# Patient Record
Sex: FEMALE | Race: BLACK OR AFRICAN AMERICAN | NOT HISPANIC OR LATINO | Employment: OTHER | URBAN - METROPOLITAN AREA
[De-identification: names, ages, dates, MRNs, and addresses within clinical notes are randomized per-mention and may not be internally consistent; named-entity substitution may affect disease eponyms.]

---

## 2018-01-31 ENCOUNTER — CONVERSION ENCOUNTER (OUTPATIENT)
Dept: MAMMOGRAPHY | Facility: CLINIC | Age: 48
End: 2018-01-31

## 2018-09-18 ENCOUNTER — TELEPHONE (OUTPATIENT)
Dept: NEUROLOGY | Facility: CLINIC | Age: 48
End: 2018-09-18

## 2018-09-18 DIAGNOSIS — G43.009 MIGRAINE WITHOUT AURA AND WITHOUT STATUS MIGRAINOSUS, NOT INTRACTABLE: Primary | ICD-10-CM

## 2018-09-18 RX ORDER — BUTALBITAL, ACETAMINOPHEN AND CAFFEINE 50; 325; 40 MG/1; MG/1; MG/1
TABLET ORAL
Qty: 30 TABLET | Refills: 2 | Status: SHIPPED | OUTPATIENT
Start: 2018-09-18 | End: 2018-11-14 | Stop reason: SDUPTHER

## 2018-09-18 NOTE — TELEPHONE ENCOUNTER
Patient is calling for a refill for her Fioricet #30 with 2 refills   She has an appointment next month

## 2018-11-01 ENCOUNTER — OFFICE VISIT (OUTPATIENT)
Dept: NEUROLOGY | Facility: CLINIC | Age: 48
End: 2018-11-01
Payer: COMMERCIAL

## 2018-11-01 ENCOUNTER — TELEPHONE (OUTPATIENT)
Dept: HEMATOLOGY ONCOLOGY | Facility: CLINIC | Age: 48
End: 2018-11-01

## 2018-11-01 VITALS
WEIGHT: 201.2 LBS | SYSTOLIC BLOOD PRESSURE: 114 MMHG | DIASTOLIC BLOOD PRESSURE: 80 MMHG | BODY MASS INDEX: 35.65 KG/M2 | HEART RATE: 112 BPM | RESPIRATION RATE: 18 BRPM | HEIGHT: 63 IN

## 2018-11-01 DIAGNOSIS — R55 NEAR SYNCOPE: ICD-10-CM

## 2018-11-01 DIAGNOSIS — G43.009 MIGRAINE WITHOUT AURA AND WITHOUT STATUS MIGRAINOSUS, NOT INTRACTABLE: Primary | ICD-10-CM

## 2018-11-01 DIAGNOSIS — G40.909 SEIZURE DISORDER (HCC): ICD-10-CM

## 2018-11-01 PROCEDURE — 99214 OFFICE O/P EST MOD 30 MIN: CPT | Performed by: PSYCHIATRY & NEUROLOGY

## 2018-11-01 RX ORDER — HYDROCODONE BITARTRATE AND ACETAMINOPHEN 7.5; 325 MG/1; MG/1
TABLET ORAL
Refills: 0 | COMMUNITY
Start: 2018-10-17

## 2018-11-01 RX ORDER — GABAPENTIN 100 MG/1
CAPSULE ORAL
COMMUNITY
Start: 2018-05-03 | End: 2019-01-30 | Stop reason: DRUGHIGH

## 2018-11-01 RX ORDER — ASPIRIN 81 MG/1
81 TABLET ORAL
COMMUNITY

## 2018-11-01 RX ORDER — IBUPROFEN 800 MG/1
TABLET ORAL
COMMUNITY
Start: 2015-11-12 | End: 2018-11-19

## 2018-11-01 NOTE — LETTER
November 1, 2018     Shaun Nunes MD  [address]    Patient: Hoda Goldstein   YOB: 1970   Date of Visit: 11/1/2018       Dear Dr Bailey Hannon:    Thank you for referring Hoda Goldstein to me for evaluation  Below are my notes for this consultation  If you have questions, please do not hesitate to call me  I look forward to following your patient along with you  Sincerely,        Hugo Huang MD        CC: No Recipients  Hugo Huang MD  11/1/2018 10:54 AM  Sign at close encounter  Patient is here today for her headaches    Patient ID: Hoda Goldstein is a 50 y o  female  Assessment/Plan:    Migraine without aura and without status migrainosus, not intractable  On her combined topiramate and gabapentin schedule she continues to do fairly well from the standpoint of migraine control  She experiences perhaps three episodes on a monthly basis  Fortunately, those episodes respond nicely to the judicious use of Fioricet  As result of her level of control on response, will not be changing her preventative medications schedule  --continue topiramate 200 mg twice daily  --continue gabapentin 100 mg capsules two capsules 3 times daily  --has not had recent blood work performed  Will be requesting CBC, CMP and topiramate level  Seizure disorder ([address] )  Continues to show excellent control with no overt occurrences since an episode of August 30, 2010   --continue topiramate 200 mg b i d  Near syncope  Presents with a new problem to our office  A week or so ago experience several days of recurrent lightheadedness and fear of impending faint  No loss of consciousness or awareness  Origin unclear  Very doubtful any relationship to her historical seizure issues  Not measurably orthostatic here in the office today  Will need further evaluation  --sleep-deprived EEG   --24 hour Holter monitor study    --have asked that she make a an appointment with her primary care practitioner, Dr Maureen Choi, to have this particular circumstance further evaluated  I spent a total of 25 min with the patient with greater than 50% of that time spent counseling and coordinating her care, specifically discussing her diagnosis, additional tests, and discussing the case with her care team, as detailed above  She will follow up in six weeks  Subjective:    HPI  Patient, 50years of age and right-handed, has been followed in this office for some time with an historical seizure disorder as well as migraine without aura  She also presents today with a new issue  From the standpoint of her migraine, she has remained on topiramate 200 mg twice daily along with gabapentin 200 mg 3 times daily and has had on average of perhaps three migraines on a monthly basis  Fortunately, these migraines respond very quickly to the judicious use of Fioricet  She relates no adverse side effects from her medications  Her seizure disorder has been and continues to be well controlled  She has had since last seen no symptoms to suggest any overt seizure activity with her seizures being controlled on her topiramate  Today, however, she states that approximately week or so ago she it was experiencing over several days recurrent episodes where she felt intense lightheadedness and a fear of possibly passing out although fortunately had no episodes of loss of consciousness, loss of tone or loss of awareness  She did not describe any specific orthostatic origin  However, she did relate all the episodes occurred while she was in a standing position  She has not as yet brought this to the attention of her primary care practitioner  She has not had recent blood work performed      Past Medical History:   Diagnosis Date    Migraines     Osteopenia     PVCs (premature ventricular contractions)     Seizure disorder [facility])      Past Surgical History: Procedure Laterality Date    NECK SURGERY      resection of a mass from the posterior neck    SHOULDER SURGERY Right     2 surgeries     Social History     Social History    Marital status: Unknown     Spouse name: N/A    Number of children: N/A    Years of education: N/A     Social History Main Topics    Smoking status: Never Smoker    Smokeless tobacco: Current User    Alcohol use No    Drug use: Unknown    Sexual activity: Not Asked     Other Topics Concern    None     Social History Narrative    None     Family History   Problem Relation Age of Onset    Hypertension Family     Migraines Family      Allergies   Allergen Reactions    Amitriptyline Other (See Comments)     confusion    Propranolol      Other reaction(s): Other (See Comments)  confusion       Current Outpatient Prescriptions:     aspirin (ECOTRIN LOW STRENGTH) 81 mg EC tablet, Take 81 mg by mouth, Disp: , Rfl:     butalbital-acetaminophen-caffeine (FIORICET,ESGIC) -40 mg per tablet, By oral route take 1 tablet every 4-6 hours as needed for headache  Limit 4 tablets per 24 hr , Disp: 30 tablet, Rfl: 2    Calcium Carbonate-Vitamin D3 600-400 MG-UNIT TABS, Take 1 tablet by mouth, Disp: , Rfl:     gabapentin (NEURONTIN) 100 mg capsule, take 2 capsules by oral route 3 times every day or as directed, Disp: , Rfl:     HYDROcodone-acetaminophen (NORCO) 5-325 mg per tablet, TAKE 1 TABLET 2-3 TIMES DAILY AS NEEDED, Disp: , Rfl: 0    ibuprofen (MOTRIN) 800 mg tablet, 3 (three) times a day , Disp: , Rfl:     topiramate (TOPAMAX) 200 MG tablet, Every 12 hours, Disp: , Rfl:     Objective:    Blood pressure 114/80, pulse (!) 112, resp  rate 18, height 5' 2 5" (1 588 m), weight 91 3 kg (201 lb 3 2 oz)  Physical Exam   Blood pressure right arm sitting 110/76, blood pressure left arm sitting 109/76, and blood pressure left arm standing 114/80  Pulse sitting 93  Pulse standing 112  Head normocephalic  Eyes nonicteric    No audible head or anterior neck bruits  Lungs clear to auscultation  Rhythm regular  GI (abdomen) soft nontender with bowel sounds present  No lower extremity edema  Neurological Exam  Alert  Pleasantly appropriately conversational   No dysarthria  Unremarkable spontaneous gait  Romberg maneuver performed unremarkably  Able to tandem walk  Cranial Nerves:   I: Olfactory sense intact bilaterally  II: Visual fields full to confrontation  Pupils equal, round, reactive to light with normal accomodation  Fundus: with bilaterally marginated discs  III,IV,VI: Extraocular muscles EOMI, no nystagmus  V: Masseter and pterygoid strength  Sensation in the V1 through V3 distributions intact to pinprick and light touch bilaterally  VII: Face is symmetric with no weakness noted  VIII: Audition intact to finger rub bilaterally  IX/X: Uvula midline  Soft palate elevation symmetric  XI: Trapezius and SCM strength 5/5 bilaterally  XII: Tongue midline with no atrophy or fasciculations with appropriate movement  Accurate with finger-to-nose and heel-to-shin maneuvers bilaterally  Full symmetrical strength throughout the four extremities with no upper extremity drift  Sensory testing grossly intact to pin and position throughout  Muscle stretch reflexes bilaterally two throughout the upper extremities and at the knees and ankles  Toe response is downgoing bilaterally  ROS:    Review of Systems   Constitutional: Negative for appetite change and fever  HENT: Negative  Negative for hearing loss, tinnitus, trouble swallowing and voice change  Eyes: Negative  Negative for photophobia and pain  Respiratory: Negative  Negative for shortness of breath  Cardiovascular: Negative  Negative for palpitations  Gastrointestinal: Positive for constipation  Negative for nausea and vomiting  Endocrine: Negative  Negative for cold intolerance and heat intolerance  Genitourinary: Negative    Negative for dysuria, frequency and urgency  Musculoskeletal: Positive for back pain and neck stiffness  Negative for myalgias  Skin: Negative  Negative for rash  Allergic/Immunologic: Negative  Neurological: Positive for dizziness, weakness (neck and thoracic area), light-headedness, numbness (hands and fingers ) and headaches  Negative for tremors, seizures, syncope, facial asymmetry and speech difficulty  Hematological: Negative  Does not bruise/bleed easily  Psychiatric/Behavioral: Positive for sleep disturbance  Negative for confusion and hallucinations  The patient is nervous/anxious  I personally reviewed the ROS that was entered by the medical assistant

## 2018-11-01 NOTE — ASSESSMENT & PLAN NOTE
On her combined topiramate and gabapentin schedule she continues to do fairly well from the standpoint of migraine control  She experiences perhaps three episodes on a monthly basis  Fortunately, those episodes respond nicely to the judicious use of Fioricet  As result of her level of control on response, will not be changing her preventative medications schedule  --continue topiramate 200 mg twice daily  --continue gabapentin 100 mg capsules two capsules 3 times daily  --has not had recent blood work performed  Will be requesting CBC, CMP and topiramate level

## 2018-11-01 NOTE — TELEPHONE ENCOUNTER
Patient called for blood work slips to be sent to the lab  Informed patient that labs have to be precerted first, and then she can have the labs done  Patient understood  Will call patient back when precert goes through

## 2018-11-01 NOTE — ASSESSMENT & PLAN NOTE
Presents with a new problem to our office  A week or so ago experience several days of recurrent lightheadedness and fear of impending faint  No loss of consciousness or awareness  Origin unclear  Very doubtful any relationship to her historical seizure issues  Not measurably orthostatic here in the office today  Will need further evaluation  --sleep-deprived EEG   --24 hour Holter monitor study  --have asked that she make a an appointment with her primary care practitioner, Dr Nathaniel Marsh, to have this particular circumstance further evaluated

## 2018-11-01 NOTE — ASSESSMENT & PLAN NOTE
Continues to show excellent control with no overt occurrences since an episode of August 30, 2010   --continue topiramate 200 mg b i d

## 2018-11-01 NOTE — PROGRESS NOTES
Patient is here today for her headaches    Patient ID: Michaelle Monroe is a 50 y o  female  Assessment/Plan:    Migraine without aura and without status migrainosus, not intractable  On her combined topiramate and gabapentin schedule she continues to do fairly well from the standpoint of migraine control  She experiences perhaps three episodes on a monthly basis  Fortunately, those episodes respond nicely to the judicious use of Fioricet  As result of her level of control on response, will not be changing her preventative medications schedule  --continue topiramate 200 mg twice daily  --continue gabapentin 100 mg capsules two capsules 3 times daily  --has not had recent blood work performed  Will be requesting CBC, CMP and topiramate level  Seizure disorder ([address] )  Continues to show excellent control with no overt occurrences since an episode of August 30, 2010   --continue topiramate 200 mg b i d  Near syncope  Presents with a new problem to our office  A week or so ago experience several days of recurrent lightheadedness and fear of impending faint  No loss of consciousness or awareness  Origin unclear  Very doubtful any relationship to her historical seizure issues  Not measurably orthostatic here in the office today  Will need further evaluation  --sleep-deprived EEG   --24 hour Holter monitor study  --have asked that she make a an appointment with her primary care practitioner, Dr Parish Swanson, to have this particular circumstance further evaluated  I spent a total of 25 min with the patient with greater than 50% of that time spent counseling and coordinating her care, specifically discussing her diagnosis, additional tests, and discussing the case with her care team, as detailed above  She will follow up in six weeks      Subjective:    HPI  Patient, 50years of age and right-handed, has been followed in this office for some time with an historical seizure disorder as well as migraine without aura  She also presents today with a new issue  From the standpoint of her migraine, she has remained on topiramate 200 mg twice daily along with gabapentin 200 mg 3 times daily and has had on average of perhaps three migraines on a monthly basis  Fortunately, these migraines respond very quickly to the judicious use of Fioricet  She relates no adverse side effects from her medications  Her seizure disorder has been and continues to be well controlled  She has had since last seen no symptoms to suggest any overt seizure activity with her seizures being controlled on her topiramate  Today, however, she states that approximately week or so ago she it was experiencing over several days recurrent episodes where she felt intense lightheadedness and a fear of possibly passing out although fortunately had no episodes of loss of consciousness, loss of tone or loss of awareness  She did not describe any specific orthostatic origin  However, she did relate all the episodes occurred while she was in a standing position  She has not as yet brought this to the attention of her primary care practitioner  She has not had recent blood work performed      Past Medical History:   Diagnosis Date    Migraines     Osteopenia     PVCs (premature ventricular contractions)     Seizure disorder ([facility])      Past Surgical History:   Procedure Laterality Date    NECK SURGERY      resection of a mass from the posterior neck    SHOULDER SURGERY Right     2 surgeries     Social History     Social History    Marital status: Unknown     Spouse name: N/A    Number of children: N/A    Years of education: N/A     Social History Main Topics    Smoking status: Never Smoker    Smokeless tobacco: Current User    Alcohol use No    Drug use: Unknown    Sexual activity: Not Asked     Other Topics Concern    None     Social History Narrative    None     Family History   Problem Relation Age of Onset    Hypertension Family     Migraines Family      Allergies   Allergen Reactions    Amitriptyline Other (See Comments)     confusion    Propranolol      Other reaction(s): Other (See Comments)  confusion       Current Outpatient Prescriptions:     aspirin (ECOTRIN LOW STRENGTH) 81 mg EC tablet, Take 81 mg by mouth, Disp: , Rfl:     butalbital-acetaminophen-caffeine (FIORICET,ESGIC) -40 mg per tablet, By oral route take 1 tablet every 4-6 hours as needed for headache  Limit 4 tablets per 24 hr , Disp: 30 tablet, Rfl: 2    Calcium Carbonate-Vitamin D3 600-400 MG-UNIT TABS, Take 1 tablet by mouth, Disp: , Rfl:     gabapentin (NEURONTIN) 100 mg capsule, take 2 capsules by oral route 3 times every day or as directed, Disp: , Rfl:     HYDROcodone-acetaminophen (NORCO) 5-325 mg per tablet, TAKE 1 TABLET 2-3 TIMES DAILY AS NEEDED, Disp: , Rfl: 0    ibuprofen (MOTRIN) 800 mg tablet, 3 (three) times a day , Disp: , Rfl:     topiramate (TOPAMAX) 200 MG tablet, Every 12 hours, Disp: , Rfl:     Objective:    Blood pressure 114/80, pulse (!) 112, resp  rate 18, height 5' 2 5" (1 588 m), weight 91 3 kg (201 lb 3 2 oz)  Physical Exam   Blood pressure right arm sitting 110/76, blood pressure left arm sitting 109/76, and blood pressure left arm standing 114/80  Pulse sitting 93  Pulse standing 112  Head normocephalic  Eyes nonicteric  No audible head or anterior neck bruits  Lungs clear to auscultation  Rhythm regular  GI (abdomen) soft nontender with bowel sounds present  No lower extremity edema  Neurological Exam  Alert  Pleasantly appropriately conversational   No dysarthria  Unremarkable spontaneous gait  Romberg maneuver performed unremarkably  Able to tandem walk  Cranial Nerves:   I: Olfactory sense intact bilaterally  II: Visual fields full to confrontation  Pupils equal, round, reactive to light with normal accomodation  Fundus: with bilaterally marginated discs    III,IV,VI: Extraocular muscles EOMI, no nystagmus  V: Masseter and pterygoid strength  Sensation in the V1 through V3 distributions intact to pinprick and light touch bilaterally  VII: Face is symmetric with no weakness noted  VIII: Audition intact to finger rub bilaterally  IX/X: Uvula midline  Soft palate elevation symmetric  XI: Trapezius and SCM strength 5/5 bilaterally  XII: Tongue midline with no atrophy or fasciculations with appropriate movement  Accurate with finger-to-nose and heel-to-shin maneuvers bilaterally  Full symmetrical strength throughout the four extremities with no upper extremity drift  Sensory testing grossly intact to pin and position throughout  Muscle stretch reflexes bilaterally two throughout the upper extremities and at the knees and ankles  Toe response is downgoing bilaterally  ROS:    Review of Systems   Constitutional: Negative for appetite change and fever  HENT: Negative  Negative for hearing loss, tinnitus, trouble swallowing and voice change  Eyes: Negative  Negative for photophobia and pain  Respiratory: Negative  Negative for shortness of breath  Cardiovascular: Negative  Negative for palpitations  Gastrointestinal: Positive for constipation  Negative for nausea and vomiting  Endocrine: Negative  Negative for cold intolerance and heat intolerance  Genitourinary: Negative  Negative for dysuria, frequency and urgency  Musculoskeletal: Positive for back pain and neck stiffness  Negative for myalgias  Skin: Negative  Negative for rash  Allergic/Immunologic: Negative  Neurological: Positive for dizziness, weakness (neck and thoracic area), light-headedness, numbness (hands and fingers ) and headaches  Negative for tremors, seizures, syncope, facial asymmetry and speech difficulty  Hematological: Negative  Does not bruise/bleed easily  Psychiatric/Behavioral: Positive for sleep disturbance  Negative for confusion and hallucinations   The patient is nervous/anxious  I personally reviewed the ROS that was entered by the medical assistant

## 2018-11-01 NOTE — PATIENT INSTRUCTIONS
Continue your topiramate 200 mg tablets taking one tablet twice daily  Continue your gabapentin 100 mg capsules to 3 times daily  Continue to use your Fioricet judiciously  Please make an appointment with Dr Дмитрий Steward for him to further evaluate your recurrent lightheaded symptoms

## 2018-11-02 ENCOUNTER — TRANSCRIBE ORDERS (OUTPATIENT)
Dept: ADMINISTRATIVE | Facility: HOSPITAL | Age: 48
End: 2018-11-02

## 2018-11-02 ENCOUNTER — TRANSCRIBE ORDERS (OUTPATIENT)
Dept: LAB | Facility: CLINIC | Age: 48
End: 2018-11-02

## 2018-11-02 DIAGNOSIS — D75.0 FAMILIAL ERYTHROCYTOSIS: Primary | ICD-10-CM

## 2018-11-06 ENCOUNTER — TRANSCRIBE ORDERS (OUTPATIENT)
Dept: ADMINISTRATIVE | Facility: HOSPITAL | Age: 48
End: 2018-11-06

## 2018-11-06 ENCOUNTER — APPOINTMENT (OUTPATIENT)
Dept: LAB | Facility: HOSPITAL | Age: 48
End: 2018-11-06
Payer: COMMERCIAL

## 2018-11-06 ENCOUNTER — APPOINTMENT (OUTPATIENT)
Dept: LAB | Facility: HOSPITAL | Age: 48
End: 2018-11-06
Attending: INTERNAL MEDICINE
Payer: COMMERCIAL

## 2018-11-06 DIAGNOSIS — E55.9 AVITAMINOSIS D: ICD-10-CM

## 2018-11-06 DIAGNOSIS — E03.9 MYXEDEMA HEART DISEASE: ICD-10-CM

## 2018-11-06 DIAGNOSIS — G40.909 SEIZURE DISORDER (HCC): ICD-10-CM

## 2018-11-06 DIAGNOSIS — D64.9 ANEMIA, UNSPECIFIED TYPE: ICD-10-CM

## 2018-11-06 DIAGNOSIS — D75.0 FAMILIAL ERYTHROCYTOSIS: ICD-10-CM

## 2018-11-06 DIAGNOSIS — I51.9 MYXEDEMA HEART DISEASE: Primary | ICD-10-CM

## 2018-11-06 DIAGNOSIS — I51.9 MYXEDEMA HEART DISEASE: ICD-10-CM

## 2018-11-06 DIAGNOSIS — E03.9 MYXEDEMA HEART DISEASE: Primary | ICD-10-CM

## 2018-11-06 DIAGNOSIS — G43.009 MIGRAINE WITHOUT AURA AND WITHOUT STATUS MIGRAINOSUS, NOT INTRACTABLE: ICD-10-CM

## 2018-11-06 LAB
25(OH)D3 SERPL-MCNC: 23.2 NG/ML (ref 30–100)
ALBUMIN SERPL BCP-MCNC: 4.8 G/DL (ref 3–5.2)
ALP SERPL-CCNC: 95 U/L (ref 43–122)
ALT SERPL W P-5'-P-CCNC: 34 U/L (ref 9–52)
ANION GAP SERPL CALCULATED.3IONS-SCNC: 11 MMOL/L (ref 5–14)
ANISOCYTOSIS BLD QL SMEAR: PRESENT
APTT PPP: 30 SECONDS (ref 23–34)
AST SERPL W P-5'-P-CCNC: 24 U/L (ref 14–36)
BASOPHILS # BLD AUTO: 0 THOUSANDS/ΜL (ref 0–0.1)
BASOPHILS NFR BLD AUTO: 1 % (ref 0–1)
BILIRUB SERPL-MCNC: 0.4 MG/DL
BUN SERPL-MCNC: 10 MG/DL (ref 5–25)
CALCIUM SERPL-MCNC: 9.7 MG/DL (ref 8.4–10.2)
CHLORIDE SERPL-SCNC: 110 MMOL/L (ref 97–108)
CO2 SERPL-SCNC: 23 MMOL/L (ref 22–30)
CREAT SERPL-MCNC: 0.81 MG/DL (ref 0.6–1.2)
CRP SERPL QL: 6.2 MG/L
D-DIMER: 0.27 MG/L
DEPRECATED AT III PPP: 113 % OF NORMAL (ref 92–136)
EOSINOPHIL # BLD AUTO: 0 THOUSAND/ΜL (ref 0–0.4)
EOSINOPHIL NFR BLD AUTO: 0 % (ref 0–6)
ERYTHROCYTE [DISTWIDTH] IN BLOOD BY AUTOMATED COUNT: 13.5 %
ERYTHROCYTE [SEDIMENTATION RATE] IN BLOOD: 7 MM/HOUR (ref 1–20)
FERRITIN SERPL-MCNC: 38 NG/ML (ref 8–388)
FOLATE SERPL-MCNC: 6.4 NG/ML (ref 3.1–17.5)
GFR SERPL CREATININE-BSD FRML MDRD: 99 ML/MIN/1.73SQ M
GLUCOSE SERPL-MCNC: 79 MG/DL (ref 70–99)
HCT VFR BLD AUTO: 51.3 % (ref 36–46)
HGB BLD-MCNC: 17.3 G/DL (ref 12–16)
IGA SERPL-MCNC: 123 MG/DL (ref 70–400)
IGG SERPL-MCNC: 1150 MG/DL (ref 700–1600)
IGM SERPL-MCNC: 105 MG/DL (ref 40–230)
INR PPP: 1 (ref 0.89–1.1)
LDH SERPL-CCNC: 527 U/L (ref 313–618)
LYMPHOCYTES # BLD AUTO: 2.3 THOUSANDS/ΜL (ref 0.5–4)
LYMPHOCYTES NFR BLD AUTO: 34 % (ref 20–50)
MCH RBC QN AUTO: 36.8 PG (ref 26–34)
MCHC RBC AUTO-ENTMCNC: 33.8 G/DL (ref 31–36)
MCV RBC AUTO: 109 FL (ref 80–100)
MONOCYTES # BLD AUTO: 0.3 THOUSAND/ΜL (ref 0.2–0.9)
MONOCYTES NFR BLD AUTO: 5 % (ref 1–10)
NEUTROPHILS # BLD AUTO: 4.1 THOUSANDS/ΜL (ref 1.8–7.8)
NEUTS SEG NFR BLD AUTO: 61 % (ref 45–65)
PLATELET # BLD AUTO: 240 THOUSANDS/UL (ref 150–450)
PLATELET BLD QL SMEAR: ADEQUATE
PMV BLD AUTO: 7.9 FL (ref 8.9–12.7)
POTASSIUM SERPL-SCNC: 3.5 MMOL/L (ref 3.6–5)
PROT SERPL-MCNC: 8.3 G/DL (ref 5.9–8.4)
PROTHROMBIN TIME: 10.6 SECONDS (ref 9.5–11.6)
RBC # BLD AUTO: 4.71 MILLION/UL (ref 4–5.2)
RBC MORPH BLD: PRESENT
RETICS # CALC: 1.41 % (ref 0.87–2.63)
SODIUM SERPL-SCNC: 144 MMOL/L (ref 137–147)
T3FREE SERPL-MCNC: 2.21 PG/ML (ref 2.3–4.2)
T4 SERPL-MCNC: 9.4 UG/DL (ref 4.7–13.3)
TIBC SERPL-MCNC: 372 UG/DL (ref 250–450)
TSH SERPL DL<=0.05 MIU/L-ACNC: 0.75 UIU/ML (ref 0.47–4.68)
VIT B12 SERPL-MCNC: 852 PG/ML (ref 100–900)
WBC # BLD AUTO: 6.8 THOUSAND/UL (ref 4.5–11)

## 2018-11-06 PROCEDURE — 85379 FIBRIN DEGRADATION QUANT: CPT

## 2018-11-06 PROCEDURE — 81240 F2 GENE: CPT

## 2018-11-06 PROCEDURE — 85303 CLOT INHIBIT PROT C ACTIVITY: CPT

## 2018-11-06 PROCEDURE — 84443 ASSAY THYROID STIM HORMONE: CPT

## 2018-11-06 PROCEDURE — 85045 AUTOMATED RETICULOCYTE COUNT: CPT

## 2018-11-06 PROCEDURE — 85307 ASSAY ACTIVATED PROTEIN C: CPT

## 2018-11-06 PROCEDURE — 83615 LACTATE (LD) (LDH) ENZYME: CPT

## 2018-11-06 PROCEDURE — 36415 COLL VENOUS BLD VENIPUNCTURE: CPT

## 2018-11-06 PROCEDURE — 82668 ASSAY OF ERYTHROPOIETIN: CPT

## 2018-11-06 PROCEDURE — 82728 ASSAY OF FERRITIN: CPT

## 2018-11-06 PROCEDURE — 80053 COMPREHEN METABOLIC PANEL: CPT

## 2018-11-06 PROCEDURE — 86140 C-REACTIVE PROTEIN: CPT

## 2018-11-06 PROCEDURE — 85730 THROMBOPLASTIN TIME PARTIAL: CPT

## 2018-11-06 PROCEDURE — 82746 ASSAY OF FOLIC ACID SERUM: CPT

## 2018-11-06 PROCEDURE — 85306 CLOT INHIBIT PROT S FREE: CPT

## 2018-11-06 PROCEDURE — 85670 THROMBIN TIME PLASMA: CPT

## 2018-11-06 PROCEDURE — 85300 ANTITHROMBIN III ACTIVITY: CPT

## 2018-11-06 PROCEDURE — 85652 RBC SED RATE AUTOMATED: CPT

## 2018-11-06 PROCEDURE — 83918 ORGANIC ACIDS TOTAL QUANT: CPT

## 2018-11-06 PROCEDURE — 82607 VITAMIN B-12: CPT

## 2018-11-06 PROCEDURE — 82784 ASSAY IGA/IGD/IGG/IGM EACH: CPT

## 2018-11-06 PROCEDURE — 85613 RUSSELL VIPER VENOM DILUTED: CPT

## 2018-11-06 PROCEDURE — 84165 PROTEIN E-PHORESIS SERUM: CPT | Performed by: PATHOLOGY

## 2018-11-06 PROCEDURE — 82232 ASSAY OF BETA-2 PROTEIN: CPT

## 2018-11-06 PROCEDURE — 85610 PROTHROMBIN TIME: CPT

## 2018-11-06 PROCEDURE — 81270 JAK2 GENE: CPT

## 2018-11-06 PROCEDURE — 84165 PROTEIN E-PHORESIS SERUM: CPT

## 2018-11-06 PROCEDURE — 86147 CARDIOLIPIN ANTIBODY EA IG: CPT

## 2018-11-06 PROCEDURE — 82306 VITAMIN D 25 HYDROXY: CPT

## 2018-11-06 PROCEDURE — 86146 BETA-2 GLYCOPROTEIN ANTIBODY: CPT

## 2018-11-06 PROCEDURE — 85732 THROMBOPLASTIN TIME PARTIAL: CPT

## 2018-11-06 PROCEDURE — 80201 ASSAY OF TOPIRAMATE: CPT

## 2018-11-06 PROCEDURE — 85025 COMPLETE CBC W/AUTO DIFF WBC: CPT

## 2018-11-06 PROCEDURE — 83550 IRON BINDING TEST: CPT

## 2018-11-06 PROCEDURE — 85705 THROMBOPLASTIN INHIBITION: CPT

## 2018-11-06 PROCEDURE — 84436 ASSAY OF TOTAL THYROXINE: CPT

## 2018-11-06 PROCEDURE — 84481 FREE ASSAY (FT-3): CPT

## 2018-11-07 ENCOUNTER — TELEPHONE (OUTPATIENT)
Dept: NEUROLOGY | Facility: CLINIC | Age: 48
End: 2018-11-07

## 2018-11-07 LAB
ALBUMIN SERPL ELPH-MCNC: 4.19 G/DL (ref 3.5–5)
ALBUMIN SERPL ELPH-MCNC: 55.9 % (ref 52–65)
ALPHA1 GLOB SERPL ELPH-MCNC: 0.4 G/DL (ref 0.1–0.4)
ALPHA1 GLOB SERPL ELPH-MCNC: 5.3 % (ref 2.5–5)
ALPHA2 GLOB SERPL ELPH-MCNC: 1.01 G/DL (ref 0.4–1.2)
ALPHA2 GLOB SERPL ELPH-MCNC: 13.5 % (ref 7–13)
B2 MICROGLOB SERPL-MCNC: 1.6 MG/L (ref 0.6–2.4)
BETA GLOB ABNORMAL SERPL ELPH-MCNC: 0.44 G/DL (ref 0.4–0.8)
BETA1 GLOB SERPL ELPH-MCNC: 5.8 % (ref 5–13)
BETA2 GLOB SERPL ELPH-MCNC: 5.3 % (ref 2–8)
BETA2+GAMMA GLOB SERPL ELPH-MCNC: 0.4 G/DL (ref 0.2–0.5)
CARDIOLIPIN IGA SER IA-ACNC: <9 APL U/ML (ref 0–11)
CARDIOLIPIN IGG SER IA-ACNC: <9 GPL U/ML (ref 0–14)
CARDIOLIPIN IGM SER IA-ACNC: 10 MPL U/ML (ref 0–12)
EPO SERPL-ACNC: 7.1 MIU/ML (ref 2.6–18.5)
GAMMA GLOB ABNORMAL SERPL ELPH-MCNC: 1.07 G/DL (ref 0.5–1.6)
GAMMA GLOB SERPL ELPH-MCNC: 14.2 % (ref 12–22)
IGG/ALB SER: 1.27 {RATIO} (ref 1.1–1.8)
PROT PATTERN SERPL ELPH-IMP: ABNORMAL
PROT SERPL-MCNC: 7.5 G/DL (ref 6.4–8.2)

## 2018-11-07 NOTE — TELEPHONE ENCOUNTER
Labs CMP and CBC dated 11/618 were faxed to PCP office Dr Washburn [place] office per Dr Beti Hester for review

## 2018-11-08 LAB
APTT SCREEN TO CONFIRM RATIO: 1.17 RATIO (ref 0–1.4)
B2 GLYCOPROT1 IGA SER-ACNC: <9 GPI IGA UNITS (ref 0–25)
B2 GLYCOPROT1 IGG SER-ACNC: <9 GPI IGG UNITS (ref 0–20)
B2 GLYCOPROT1 IGM SER-ACNC: <9 GPI IGM UNITS (ref 0–32)
CONFIRM APTT/NORMAL: 46.9 SEC (ref 0–55)
LA PPP-IMP: NORMAL
PROT C AG ACT/NOR PPP IA: >150 % OF NORMAL (ref 60–150)
PROT S ACT/NOR PPP: 82 % (ref 63–140)
SCREEN APTT: 36.8 SEC (ref 0–51.9)
SCREEN DRVVT: 38.2 SEC (ref 0–47)
THROMBIN TIME: 17.8 SEC (ref 0–23)
TOPIRAMATE SERPL-MCNC: 6.8 UG/ML (ref 2–25)

## 2018-11-09 LAB
F2 GENE MUT ANL BLD/T: NORMAL
SCAN RESULT: NORMAL

## 2018-11-12 LAB
METHYLMALONATE SERPL-SCNC: 76 NMOL/L (ref 0–378)
SL AMB DISCLAIMER: NORMAL

## 2018-11-14 ENCOUNTER — TELEPHONE (OUTPATIENT)
Dept: HEMATOLOGY ONCOLOGY | Facility: CLINIC | Age: 48
End: 2018-11-14

## 2018-11-14 DIAGNOSIS — G43.009 MIGRAINE WITHOUT AURA AND WITHOUT STATUS MIGRAINOSUS, NOT INTRACTABLE: ICD-10-CM

## 2018-11-14 RX ORDER — BUTALBITAL, ACETAMINOPHEN AND CAFFEINE 50; 325; 40 MG/1; MG/1; MG/1
TABLET ORAL
Qty: 30 TABLET | Refills: 1 | Status: SHIPPED | OUTPATIENT
Start: 2018-11-14 | End: 2019-01-30 | Stop reason: SDUPTHER

## 2018-11-14 NOTE — TELEPHONE ENCOUNTER
Called patient, call went to voicemail  Left message for patient to give the office a call regarding her request for lab results, as Dr Jonah Eller will go over her labs at her f/u apt  Offered pt an apt for tomorrow so she can get results sooner

## 2018-11-14 NOTE — TELEPHONE ENCOUNTER
Patient called wanting the results of her lab work she had done 18  Patient does not want to wait for her follow up apt, which is 18  Patient wants to know if there are any abnormalities  She is worried because her brother who  also had a history like hers

## 2018-11-14 NOTE — TELEPHONE ENCOUNTER
Can you call her back and tell her that Dr Marco Tejeda does not like to discuss results over the phone especially since she had multiple different tests  He will discuss all the results with her at the follow up appointment in 2 days

## 2018-11-16 ENCOUNTER — TELEPHONE (OUTPATIENT)
Dept: HEMATOLOGY ONCOLOGY | Facility: CLINIC | Age: 48
End: 2018-11-16

## 2018-11-16 NOTE — TELEPHONE ENCOUNTER
Patient missed 10:40 a m  Appointment  Called and left a message offering the patient a later apt time for today, or we can reschedule for another day; asked for patient to call the office back to reschedule

## 2018-11-19 ENCOUNTER — OFFICE VISIT (OUTPATIENT)
Dept: HEMATOLOGY ONCOLOGY | Facility: CLINIC | Age: 48
End: 2018-11-19
Payer: COMMERCIAL

## 2018-11-19 VITALS
SYSTOLIC BLOOD PRESSURE: 110 MMHG | DIASTOLIC BLOOD PRESSURE: 78 MMHG | TEMPERATURE: 96.8 F | RESPIRATION RATE: 18 BRPM | OXYGEN SATURATION: 98 % | BODY MASS INDEX: 35.97 KG/M2 | HEIGHT: 63 IN | HEART RATE: 78 BPM | WEIGHT: 203 LBS

## 2018-11-19 DIAGNOSIS — D75.0 POLYCYTHEMIA, FAMILIAL: Primary | ICD-10-CM

## 2018-11-19 DIAGNOSIS — Z83.2 FAMILY HISTORY OF HYPERCOAGULABILITY: ICD-10-CM

## 2018-11-19 PROCEDURE — 99214 OFFICE O/P EST MOD 30 MIN: CPT | Performed by: INTERNAL MEDICINE

## 2018-11-19 RX ORDER — HYDROCODONE BITARTRATE 30 MG/1
CAPSULE, EXTENDED RELEASE ORAL 2 TIMES DAILY
COMMUNITY

## 2018-11-19 NOTE — PROGRESS NOTES
Hematology/Oncology Outpatient Follow-up  Vera Castillo 50 y o  female 1970 [id number]    Date:  2018        Assessment and Plan:  1  Polycythemia, familial  The patient was was negative for the JAK2 mutation however, she continues to have polycythemia which seems to be familial   She has also high MCV which needs to be further investigated since the initial workup did not reveal any hint of abnormalities from the peripheral blood  After lengthy discussion a bone marrow biopsy was ordered to rule out a bone marrow disorder like a myeloproliferative disorder as the etiology of her macrocytic erythrocytosis/polycythemia  - CBC and differential; Future  - Comprehensive metabolic panel; Future  - LD,Blood; Future  - Erythropoietin; Future  - Viscosity, serum; Future  - IR bone marrow biopsy/aspiration; Future    2  Family history of hypercoagulability  The hypercoagulable workup came back negative  We will wait for the factor 5 Leiden study to come back  I did advise her that she has her risk of developing deep vein thrombosis since this is running in her family even though her hypercoagulable state workup came back negative so far  I do strongly recommend prophylactic use of low molecular heparin with any surgical procedure  HPI:  The patient came in today for a follow-up visit  She is a 49-year-old female with strong positive family history for clotting disorder  The patient has a history of seizure disorder, arthritis, migraine headaches, history of colon polyps, her last colonoscopy was in   Her family history is positive for polycythemia/erythrocytosis in her mother and maternal aunt  Her brother  at age 55 due to deep vein thrombosis/pulmonary embolism after a car trip  Her sister also had a diagnosis of DVT at age 52 after also a car trip    The patient seemed very concerned about her strong family history for hypercoagulability and asked to get extensive workup done for that particular reason  The hypercoagulable workup came back entirely normal with out any hint of prothrombin G mutation , her factor 5 Leiden studies still pending  She also has a positive history for polycythemia most likely since childhood with positive family history for the same problem as stated above  Her most recent blood work continues to show elevated hemoglobin hematocrit and high MCV, with normal erythropoietin level and negative JAK2 mutation study  The patient seems to be symptomatic with itching after hot showers and frequent headaches  Interval history:    ROS: Review of Systems   Constitutional: Positive for fatigue  Negative for activity change, appetite change, chills, diaphoresis, fever and unexpected weight change  Hot flashes   HENT: Positive for mouth sores  Negative for congestion, dental problem, ear discharge, ear pain, facial swelling, hearing loss, nosebleeds, postnasal drip, sore throat, tinnitus and trouble swallowing  Eyes: Negative for discharge, redness, itching and visual disturbance  Respiratory: Negative for cough, chest tightness, shortness of breath and wheezing  Cardiovascular: Negative for chest pain, palpitations and leg swelling  Gastrointestinal: Positive for constipation  Negative for abdominal distention, abdominal pain, anal bleeding, blood in stool, diarrhea, nausea and vomiting  Genitourinary: Negative for difficulty urinating, dysuria, flank pain, frequency, hematuria and urgency  Musculoskeletal: Positive for arthralgias, back pain and neck pain  Negative for gait problem, joint swelling and myalgias  Skin: Negative for color change, pallor, rash and wound  Neurological: Positive for numbness and headaches (Severe)  Negative for dizziness, syncope, speech difficulty, weakness and light-headedness  Hematological: Negative for adenopathy  Does not bruise/bleed easily     Psychiatric/Behavioral: Negative for agitation, behavioral problems, confusion and sleep disturbance  Past Medical History:   Diagnosis Date    Migraines     Osteopenia     PVCs (premature ventricular contractions)     Seizure disorder (HCC)        Past Surgical History:   Procedure Laterality Date    NECK SURGERY      resection of a mass from the posterior neck    SHOULDER SURGERY Right     2 surgeries       Social History     Social History    Marital status: /Civil Union     Spouse name: N/A    Number of children: N/A    Years of education: N/A     Social History Main Topics    Smoking status: Current Every Day Smoker    Smokeless tobacco: Never Used    Alcohol use No    Drug use: No    Sexual activity: Not Asked     Other Topics Concern    None     Social History Narrative    None       Family History   Problem Relation Age of Onset    Hypertension Family     Migraines Family        Allergies   Allergen Reactions    Amitriptyline Other (See Comments)     confusion    Propranolol      Other reaction(s): Other (See Comments)  confusion         Current Outpatient Prescriptions:     aspirin (ECOTRIN LOW STRENGTH) 81 mg EC tablet, Take 81 mg by mouth, Disp: , Rfl:     butalbital-acetaminophen-caffeine (FIORICET,ESGIC) -40 mg per tablet, By oral route take 1 tablet every 4-6 hours as needed for headache    Limit 4 tablets per 24 hr , Disp: 30 tablet, Rfl: 1    Calcium Carbonate-Vitamin D3 600-400 MG-UNIT TABS, Take 1 tablet by mouth, Disp: , Rfl:     gabapentin (NEURONTIN) 100 mg capsule, take 2 capsules by oral route 3 times every day or as directed, Disp: , Rfl:     HYDROcodone Bitartrate ER (ZOHYDRO ER) 30 MG CP12, Take by mouth, Disp: , Rfl:     HYDROcodone-acetaminophen (NORCO) 5-325 mg per tablet, TAKE 1 TABLET 2-3 TIMES DAILY AS NEEDED, Disp: , Rfl: 0    topiramate (TOPAMAX) 200 MG tablet, Every 12 hours, Disp: , Rfl:       Physical Exam:  /78 (BP Location: Left arm, Patient Position: Sitting, Cuff Size: Adult)   Pulse 78 Temp (!) 96 8 °F (36 °C) (Tympanic)   Resp 18   Ht 5' 2 5" (1 588 m)   Wt 92 1 kg (203 lb)   SpO2 98%   BMI 36 54 kg/m²     Physical Exam   Constitutional: She is oriented to person, place, and time  She appears well-developed and well-nourished  No distress  HENT:   Head: Normocephalic and atraumatic  Nose: Nose normal    Mouth/Throat: Oropharynx is clear and moist    Eyes: Pupils are equal, round, and reactive to light  Conjunctivae and EOM are normal  Right eye exhibits no discharge  Left eye exhibits no discharge  No scleral icterus  Neck: Normal range of motion  Neck supple  No JVD present  No tracheal deviation present  No thyromegaly present  Cardiovascular: Normal rate, regular rhythm and normal heart sounds  Exam reveals no friction rub  No murmur heard  Pulmonary/Chest: Effort normal and breath sounds normal  No stridor  No respiratory distress  She has no wheezes  She has no rales  She exhibits no tenderness  Abdominal: Soft  Bowel sounds are normal  She exhibits no distension and no mass  There is no hepatosplenomegaly, splenomegaly or hepatomegaly  There is no tenderness  There is no rebound and no guarding  Musculoskeletal: Normal range of motion  She exhibits no edema, tenderness or deformity  Lymphadenopathy:     She has no cervical adenopathy  Neurological: She is alert and oriented to person, place, and time  She has normal reflexes  No cranial nerve deficit  Coordination normal    Skin: Skin is warm and dry  No rash noted  She is not diaphoretic  No erythema  No pallor  Psychiatric: She has a normal mood and affect   Her behavior is normal  Judgment and thought content normal          Labs:  Lab Results   Component Value Date    WBC 6 80 11/06/2018    HGB 17 3 (H) 11/06/2018    HCT 51 3 (H) 11/06/2018     (H) 11/06/2018     11/06/2018     Lab Results   Component Value Date    K 3 5 (L) 11/06/2018     (H) 11/06/2018    CO2 23 11/06/2018    BUN 10 11/06/2018    CREATININE 0 81 11/06/2018    CALCIUM 9 7 11/06/2018    AST 24 11/06/2018    ALT 34 11/06/2018    ALKPHOS 95 11/06/2018    EGFR 99 11/06/2018     No results found for: TSH    Patient voiced understanding and agreement in the above discussion  Aware to contact our office with questions/symptoms in the interim

## 2018-11-26 LAB — MISCELLANEOUS LAB TEST RESULT: NORMAL

## 2018-11-28 ENCOUNTER — TELEPHONE (OUTPATIENT)
Dept: SURGERY | Facility: HOSPITAL | Age: 48
End: 2018-11-28

## 2018-11-29 ENCOUNTER — HOSPITAL ENCOUNTER (OUTPATIENT)
Dept: INTERVENTIONAL RADIOLOGY/VASCULAR | Facility: HOSPITAL | Age: 48
Discharge: HOME | End: 2018-11-29
Admitting: PHYSICAL MEDICINE & REHABILITATION
Payer: COMMERCIAL

## 2018-11-29 VITALS
WEIGHT: 190 LBS | BODY MASS INDEX: 33.66 KG/M2 | SYSTOLIC BLOOD PRESSURE: 117 MMHG | RESPIRATION RATE: 18 BRPM | HEART RATE: 74 BPM | DIASTOLIC BLOOD PRESSURE: 74 MMHG | TEMPERATURE: 95.9 F | HEIGHT: 63 IN | OXYGEN SATURATION: 98 %

## 2018-11-29 DIAGNOSIS — M12.88 OTHER SPECIFIC ARTHROPATHIES, NOT ELSEWHERE CLASSIFIED, OTHER SPECIFIED SITE: ICD-10-CM

## 2018-11-29 DIAGNOSIS — M54.2 CERVICALGIA: ICD-10-CM

## 2018-11-29 PROCEDURE — 99153 MOD SED SAME PHYS/QHP EA: CPT

## 2018-11-29 PROCEDURE — 99152 MOD SED SAME PHYS/QHP 5/>YRS: CPT

## 2018-11-29 RX ORDER — SODIUM CHLORIDE 9 MG/ML
50 INJECTION, SOLUTION INTRAVENOUS CONTINUOUS
Status: DISCONTINUED | OUTPATIENT
Start: 2018-11-29 | End: 2018-12-03 | Stop reason: HOSPADM

## 2018-11-29 RX ORDER — LIDOCAINE HYDROCHLORIDE 10 MG/ML
INJECTION, SOLUTION EPIDURAL; INFILTRATION; INTRACAUDAL; PERINEURAL CODE/TRAUMA/SEDATION MEDICATION
Status: COMPLETED | OUTPATIENT
Start: 2018-11-29 | End: 2018-11-29

## 2018-11-29 RX ORDER — KETOROLAC TROMETHAMINE 30 MG/ML
INJECTION, SOLUTION INTRAMUSCULAR; INTRAVENOUS CODE/TRAUMA/SEDATION MEDICATION
Status: COMPLETED | OUTPATIENT
Start: 2018-11-29 | End: 2018-11-29

## 2018-11-29 RX ORDER — FENTANYL CITRATE 50 UG/ML
INJECTION, SOLUTION INTRAMUSCULAR; INTRAVENOUS CODE/TRAUMA/SEDATION MEDICATION
Status: COMPLETED | OUTPATIENT
Start: 2018-11-29 | End: 2018-11-29

## 2018-11-29 RX ORDER — MIDAZOLAM HYDROCHLORIDE 1 MG/ML
INJECTION INTRAMUSCULAR; INTRAVENOUS CODE/TRAUMA/SEDATION MEDICATION
Status: COMPLETED | OUTPATIENT
Start: 2018-11-29 | End: 2018-11-29

## 2018-11-29 RX ADMIN — MIDAZOLAM HYDROCHLORIDE 1 MG: 1 INJECTION, SOLUTION INTRAMUSCULAR; INTRAVENOUS at 12:14

## 2018-11-29 RX ADMIN — KETOROLAC TROMETHAMINE 30 MG: 30 INJECTION, SOLUTION INTRAMUSCULAR; INTRAVENOUS at 12:04

## 2018-11-29 RX ADMIN — MIDAZOLAM HYDROCHLORIDE 2 MG: 1 INJECTION, SOLUTION INTRAMUSCULAR; INTRAVENOUS at 12:08

## 2018-11-29 RX ADMIN — SODIUM BICARBONATE 5 MEQ: 84 INJECTION PARENTERAL at 12:06

## 2018-11-29 RX ADMIN — LIDOCAINE HYDROCHLORIDE 5 ML: 10 INJECTION, SOLUTION EPIDURAL; INFILTRATION; INTRACAUDAL; PERINEURAL at 12:05

## 2018-11-29 RX ADMIN — FENTANYL CITRATE 50 MCG: 50 INJECTION INTRAMUSCULAR; INTRAVENOUS at 12:04

## 2018-11-29 RX ADMIN — SODIUM CHLORIDE 50 ML/HR: 9 INJECTION, SOLUTION INTRAVENOUS at 10:47

## 2018-11-29 RX ADMIN — MIDAZOLAM HYDROCHLORIDE 2 MG: 1 INJECTION, SOLUTION INTRAMUSCULAR; INTRAVENOUS at 12:04

## 2018-11-29 RX ADMIN — FENTANYL CITRATE 25 MCG: 50 INJECTION INTRAMUSCULAR; INTRAVENOUS at 12:08

## 2018-11-29 RX ADMIN — FENTANYL CITRATE 25 MCG: 50 INJECTION INTRAMUSCULAR; INTRAVENOUS at 12:14

## 2018-11-29 NOTE — H&P
History of Present Illness: The patient is a 50 y o  female who presents with complaints of right cervical spondylosis, cervical facet syndrome    Patient Active Problem List   Diagnosis    Migraine without aura and without status migrainosus, not intractable    Seizure disorder (HCC)    Near syncope    Polycythemia, familial    Family history of hypercoagulability       Past Medical History:   Diagnosis Date    Migraines     Osteopenia     PVCs (premature ventricular contractions)     Seizure disorder (HCC)        Past Surgical History:   Procedure Laterality Date    APPENDECTOMY      COLONOSCOPY      NECK SURGERY      resection of a mass from the posterior neck    SHOULDER SURGERY Right     2 surgeries         Current Outpatient Prescriptions:     aspirin (ECOTRIN LOW STRENGTH) 81 mg EC tablet, Take 81 mg by mouth, Disp: , Rfl:     butalbital-acetaminophen-caffeine (FIORICET,ESGIC) -40 mg per tablet, By oral route take 1 tablet every 4-6 hours as needed for headache  Limit 4 tablets per 24 hr , Disp: 30 tablet, Rfl: 1    Calcium Carbonate-Vitamin D3 600-400 MG-UNIT TABS, Take 1 tablet by mouth, Disp: , Rfl:     gabapentin (NEURONTIN) 100 mg capsule, take 2 capsules by oral route 3 times every day or as directed, Disp: , Rfl:     HYDROcodone Bitartrate ER (ZOHYDRO ER) 30 MG CP12, Take by mouth, Disp: , Rfl:     HYDROcodone-acetaminophen (NORCO) 5-325 mg per tablet, TAKE 1 TABLET 2-3 TIMES DAILY AS NEEDED, Disp: , Rfl: 0    topiramate (TOPAMAX) 200 MG tablet, Every 12 hours, Disp: , Rfl:     Current Facility-Administered Medications:     sodium chloride 0 9 % infusion, 50 mL/hr, Intravenous, Continuous, Eric Dixon MD    Allergies   Allergen Reactions    Amitriptyline Other (See Comments)     confusion    Propranolol      Other reaction(s):  Other (See Comments)  confusion       Physical Exam:   Vitals:    11/29/18 1255   BP: 117/74   Pulse: 74   Resp: 18   Temp:    SpO2: General: Awake, Alert, Oriented x 3, Mood and affect appropriate  Respiratory: Respirations even and unlabored  Cardiovascular: Peripheral pulses intact; no edema  Musculoskeletal Exam:  Limited cervical range of motion, tender cervical facets      Assessment:   1  Cervicalgia    2   Other specific arthropathies, not elsewhere classified, other specified site        Plan:  Right cervical facet ablation

## 2018-11-29 NOTE — NURSING NOTE
Pt tolerated toast and PO fluids  Pt denies nausea  IV site D/C'd intact  Site WNL  Band-aid applied  Discharge Instructions provided and reviewed with pt  Pt verbalizes understanding of same  Lumbar Radiofrequency Ablation provided and updated for Cervical  Pt denies pain  Positive movement/sensation to bilateral arms and legs  Gait steady when walking  No complaints offered  Pt discharged home ambulatory accompanied by

## 2018-11-29 NOTE — NURSING NOTE
Pt received back to Rm 445 via stretcher from Cath Lab  Pt AAOx3  VSS  2 Band-aids C/D/I to Right Anterior Neck  Positive movement/sensation to all extremities  Pt denies pain  IV fluids infusing  Call bell within reach   at pt's side  Snack provided

## 2018-11-29 NOTE — DISCHARGE INSTRUCTIONS
Lumbar Radiofrequency Ablation   WHAT YOU NEED TO KNOW:   Lumbar radiofrequency ablation (RFA) is a procedure used to treat facet joint pain in your lower back  Facet joints are found at the back of each vertebra  A needle electrode is used to send electrical currents to the nerves in your facet joint  The electrical currents create heat that damages the nerve so it cannot send pain signals  DISCHARGE INSTRUCTIONS:   Seek care immediately if:   · You cannot move your leg  · You cannot control your urine or bowel movements  · You have severe pain in your lower back  Contact your healthcare provider if:   · You have leg weakness  · You develop new symptoms  · You have questions or concerns about your condition or care  Medicines:   · Pain medicine  may be given  Ask how to take this medicine safely  · Take your medicine as directed  Contact your healthcare provider if you think your medicine is not helping or if you have side effects  Tell him or her if you are allergic to any medicine  Keep a list of the medicines, vitamins, and herbs you take  Include the amounts, and when and why you take them  Bring the list or the pill bottles to follow-up visits  Carry your medicine list with you in case of an emergency  Follow up with your healthcare provider as directed:  Write down your questions so you remember to ask them during your visits  Activity:  Do not drive a car or operate machinery within 24 hours after your procedure  Ask your healthcare provider about any other activities you should avoid  © 2017 [employer] Information is for End User's use only and may not be sold, redistributed or otherwise used for commercial purposes  All illustrations and images included in CareNotes® are the copyrighted property of [employer]  or [employer]  The above information is an  only   It is not intended as medical advice for individual conditions or treatments  Talk to your doctor, nurse or pharmacist before following any medical regimen to see if it is safe and effective for you

## 2018-12-04 ENCOUNTER — HOSPITAL ENCOUNTER (OUTPATIENT)
Dept: NEUROLOGY | Facility: HOSPITAL | Age: 48
Discharge: HOME | End: 2018-12-04
Payer: COMMERCIAL

## 2018-12-04 DIAGNOSIS — G40.909 SEIZURE DISORDER (HCC): ICD-10-CM

## 2018-12-04 PROCEDURE — 95819 EEG AWAKE AND ASLEEP: CPT | Performed by: PSYCHIATRY & NEUROLOGY

## 2018-12-04 PROCEDURE — 95819 EEG AWAKE AND ASLEEP: CPT

## 2019-01-30 ENCOUNTER — OFFICE VISIT (OUTPATIENT)
Dept: NEUROLOGY | Facility: CLINIC | Age: 49
End: 2019-01-30
Payer: COMMERCIAL

## 2019-01-30 ENCOUNTER — APPOINTMENT (OUTPATIENT)
Dept: LAB | Facility: HOSPITAL | Age: 49
End: 2019-01-30
Attending: INTERNAL MEDICINE
Payer: COMMERCIAL

## 2019-01-30 ENCOUNTER — TRANSCRIBE ORDERS (OUTPATIENT)
Dept: ADMINISTRATIVE | Facility: HOSPITAL | Age: 49
End: 2019-01-30

## 2019-01-30 ENCOUNTER — TELEPHONE (OUTPATIENT)
Dept: NEUROLOGY | Facility: CLINIC | Age: 49
End: 2019-01-30

## 2019-01-30 VITALS
DIASTOLIC BLOOD PRESSURE: 76 MMHG | HEIGHT: 63 IN | BODY MASS INDEX: 36 KG/M2 | WEIGHT: 203.2 LBS | SYSTOLIC BLOOD PRESSURE: 126 MMHG | RESPIRATION RATE: 18 BRPM | HEART RATE: 78 BPM

## 2019-01-30 DIAGNOSIS — G40.909 SEIZURE DISORDER (HCC): Primary | ICD-10-CM

## 2019-01-30 DIAGNOSIS — R55 NEAR SYNCOPE: ICD-10-CM

## 2019-01-30 DIAGNOSIS — G43.009 MIGRAINE WITHOUT AURA AND WITHOUT STATUS MIGRAINOSUS, NOT INTRACTABLE: ICD-10-CM

## 2019-01-30 DIAGNOSIS — D75.0 POLYCYTHEMIA, FAMILIAL: ICD-10-CM

## 2019-01-30 PROCEDURE — 85810 BLOOD VISCOSITY EXAMINATION: CPT

## 2019-01-30 PROCEDURE — 82668 ASSAY OF ERYTHROPOIETIN: CPT

## 2019-01-30 PROCEDURE — 36415 COLL VENOUS BLD VENIPUNCTURE: CPT

## 2019-01-30 PROCEDURE — 99214 OFFICE O/P EST MOD 30 MIN: CPT | Performed by: PSYCHIATRY & NEUROLOGY

## 2019-01-30 RX ORDER — BUTALBITAL, ACETAMINOPHEN AND CAFFEINE 50; 325; 40 MG/1; MG/1; MG/1
TABLET ORAL
Qty: 30 TABLET | Refills: 1 | Status: SHIPPED | OUTPATIENT
Start: 2019-01-30 | End: 2019-03-15 | Stop reason: SDUPTHER

## 2019-01-30 RX ORDER — GABAPENTIN 300 MG/1
300 CAPSULE ORAL 3 TIMES DAILY
Qty: 270 CAPSULE | Refills: 1 | Status: SHIPPED | OUTPATIENT
Start: 2019-01-30 | End: 2019-09-12 | Stop reason: SDUPTHER

## 2019-01-30 RX ORDER — LORAZEPAM 0.5 MG/1
0.5 TABLET ORAL 2 TIMES DAILY PRN
Refills: 1 | COMMUNITY
Start: 2019-01-17 | End: 2021-10-29 | Stop reason: CLARIF

## 2019-01-30 NOTE — PATIENT INSTRUCTIONS
While awaiting four your new gabapentin to arrive increase gabapentin 100 mg capsules and take 3 capsules 3 times daily  When the new gabapentin arrives will be 300 mg capsules and you are to take o1 capsule 3 times daily  Please keep a headache diary  Call the office in one month with a status report or before then should she have any questions or concerns

## 2019-01-30 NOTE — TELEPHONE ENCOUNTER
Patient called stating she spoke to pharmacist and he said they had not received the prescription  I called and spoke with Ana Paula Jorgensen, pharmacist, who states that they have the prescription but it was in a "special prescription que" and it requires something to be done with insurance  I asked if anything further was needed from our office which he denied  I also asked for time frame as to when the prescription would be ready for the patient  Joe said patient can  in 10 mins if she needs to and they will complete whatever is necessary  Patient made aware

## 2019-01-30 NOTE — ASSESSMENT & PLAN NOTE
Have been non problematic since her last appointment  EEG a normal awake drowsing sleep study  Has yet to have her 24 hour Holter study performed but plans to do so  Continues to work with her primary care practitioner regarding this particular past issue

## 2019-01-30 NOTE — ASSESSMENT & PLAN NOTE
Continues to be well controlled with no seizure or seizure-like symptoms  Last recorded event August 30, 2010   --continues on her topiramate 200 mg twice daily

## 2019-01-30 NOTE — ASSESSMENT & PLAN NOTE
Has been experiencing on average of three migraines on a monthly basis and is hopeful for yet better control  Tolerating her current topiramate schedule without any adverse side effects  Has been on low-dose gabapentin as an adjunct agent for headache prevention  --hesitant to consider further increase in her topiramate given her already total daily dose of 400 mg  As result she will continue her topiramate at 200 mg twice daily  --advance gabapentin to 300 mg 3 times daily  --to continue utilizing her Fioricet judiciously  --to maintain a headache diary and check in with the office with a status update in four weeks  She will call before then should she have any questions or concerns with the advanced gabapentin schedule

## 2019-01-30 NOTE — PROGRESS NOTES
Patient is here today for her migraine's    Patient ID: Miley Dowling is a 50 y o  female  Assessment/Plan:    Near syncope  Have been non problematic since her last appointment  EEG a normal awake drowsing sleep study  Has yet to have her 24 hour Holter study performed but plans to do so  Continues to work with her primary care practitioner regarding this particular past issue  Seizure disorder ([address] )  Continues to be well controlled with no seizure or seizure-like symptoms  Last recorded event August 30, 2010   --continues on her topiramate 200 mg twice daily  Migraine without aura and without status migrainosus, not intractable  Has been experiencing on average of three migraines on a monthly basis and is hopeful for yet better control  Tolerating her current topiramate schedule without any adverse side effects  Has been on low-dose gabapentin as an adjunct agent for headache prevention  --hesitant to consider further increase in her topiramate given her already total daily dose of 400 mg  As result she will continue her topiramate at 200 mg twice daily  --advance gabapentin to 300 mg 3 times daily  --to continue utilizing her Fioricet judiciously  --to maintain a headache diary and check in with the office with a status update in four weeks  She will call before then should she have any questions or concerns with the advanced gabapentin schedule  I spent a total of 25 min with the patient with greater than 50% of that time spent counseling and coordinating her care, specifically discussing her diagnosis, additional tests, and discussing the case with her care team, as detailed above  She will follow up in three months or p r n     Subjective:    HPI  Patient, 50years of age, follows here with several issues  On her last appointment she was describing several episodes of near syncope  Fortunately, these have been non recurrence since    She did have her follow-up EEG study performed and the results were reviewed with her  The study was a normal awake, drowsing and sleep study  She was also scheduled to have a 24 hour Holter monitor performed, but has not as yet done so  She is working with her primary care practitioner with regard to these past symptoms  With regard to her migraines, she has been experiencing on average of three headaches on a monthly basis  She is hopeful for yet better control  She has been on topiramate 200 mg twice daily (also for her historical seizure disorder) with the recent addition of low-dose gabapentin 200 mg t i d  as an adjunct measure  She expresses no symptoms to suggest any adverse side effects from either medication  She does use her Fioricet very judiciously so as to avoid any analgesic overuse effect  With regard to her seizures, she continues to do well on her topiramate  She has had no symptoms to suggest any recurrence with her last reported event on August 30, 2010  Blood work was performed November was reviewed topiramate level 6 8  CMP essentially unremarkable  CBC with hemoglobin 17 3 and hematocrit 51 3 (known familial polycythemia) with white count 6 80 and platelet count 716      Past Medical History:   Diagnosis Date    Migraines     Osteopenia     PVCs (premature ventricular contractions)     Seizure disorder (HCC)      Past Surgical History:   Procedure Laterality Date    APPENDECTOMY      COLONOSCOPY      NECK SURGERY      resection of a mass from the posterior neck    SHOULDER SURGERY Right     2 surgeries     Social History     Social History    Marital status: /Civil Union     Spouse name: N/A    Number of children: N/A    Years of education: N/A     Social History Main Topics    Smoking status: Current Every Day Smoker     Packs/day: 0 50    Smokeless tobacco: Never Used    Alcohol use No    Drug use: No    Sexual activity: Yes     Other Topics Concern    None     Social History Narrative  None     Family History   Problem Relation Age of Onset    Hypertension Family     Migraines Family     Hypertension Father      Allergies   Allergen Reactions    Amitriptyline Other (See Comments)     confusion    Propranolol Other (See Comments)     Other reaction(s): Other (See Comments)  confusion       Current Outpatient Prescriptions:     aspirin (ECOTRIN LOW STRENGTH) 81 mg EC tablet, Take 81 mg by mouth, Disp: , Rfl:     butalbital-acetaminophen-caffeine (FIORICET,ESGIC) -40 mg per tablet, By oral route take 1 tablet every 4-6 hours as needed for headache  Limit 4 tablets per 24 hr , Disp: 30 tablet, Rfl: 1    Calcium Carbonate-Vitamin D3 600-400 MG-UNIT TABS, Take 1 tablet by mouth, Disp: , Rfl:     HYDROcodone Bitartrate ER (ZOHYDRO ER) 30 MG CP12, Take by mouth, Disp: , Rfl:     HYDROcodone-acetaminophen (NORCO) 5-325 mg per tablet, TAKE 1 TABLET 2-3 TIMES DAILY AS NEEDED, Disp: , Rfl: 0    topiramate (TOPAMAX) 200 MG tablet, Take 1 tablet (200 mg total) by mouth 2 (two) times a day, Disp: 180 tablet, Rfl: 1    gabapentin (NEURONTIN) 300 mg capsule, Take 1 capsule (300 mg total) by mouth 3 (three) times a day, Disp: 270 capsule, Rfl: 1    LORazepam (ATIVAN) 0 5 mg tablet, 0 5 mg 2 (two) times a day as needed, Disp: , Rfl: 1    Objective:    Blood pressure 126/76, pulse 78, resp  rate 18, height 5' 2 5" (1 588 m), weight 92 2 kg (203 lb 3 2 oz)  Physical Exam  Lungs clear to auscultation  Rhythm regular  No audible anterior neck bruits  Neurological Exam  Alert  Pleasantly interactive  Fully oriented  Unremarkable spontaneous gait  Cranial nerves 2-12 tested and grossly intact  Funduscopic examination with bilaterally marginated discs  Full symmetrical strength throughout the four extremities with no upper extremity drift  Muscle stretch reflexes bilaterally one throughout the upper extremities, at the knees and at the ankles      ROS:    Review of Systems Constitutional: Negative  Negative for appetite change and fever  HENT: Negative  Negative for hearing loss, tinnitus, trouble swallowing and voice change  Eyes: Negative  Negative for photophobia and pain  Respiratory: Negative  Negative for shortness of breath  Cardiovascular: Negative  Negative for palpitations  Gastrointestinal: Positive for constipation  Negative for nausea and vomiting  Endocrine: Negative  Negative for cold intolerance and heat intolerance  Genitourinary: Negative  Negative for dysuria, frequency and urgency  Musculoskeletal: Positive for back pain, neck pain and neck stiffness  Negative for myalgias  Balance issues   Skin: Negative  Negative for rash  Neurological: Positive for headaches  Negative for dizziness, tremors, seizures, syncope, facial asymmetry, speech difficulty, weakness, light-headedness and numbness (right hand & ring finger)  Tingling in right hand   Hematological: Negative  Does not bruise/bleed easily  Psychiatric/Behavioral: Positive for confusion (at times) and sleep disturbance  Negative for hallucinations  The patient is nervous/anxious  I personally reviewed the ROS that was entered by the medical assistant  *Please note this document was created using voice recognition software and may contain sound-alike word errors  *

## 2019-01-31 LAB — EPO SERPL-ACNC: 11.5 MIU/ML (ref 2.6–18.5)

## 2019-02-01 LAB — VISC SER: 1.6 REL.SALINE (ref 1.6–1.9)

## 2019-02-22 ENCOUNTER — TRANSCRIBE ORDERS (OUTPATIENT)
Dept: ADMINISTRATIVE | Facility: HOSPITAL | Age: 49
End: 2019-02-22

## 2019-02-22 DIAGNOSIS — M81.0 OSTEOPOROSIS, UNSPECIFIED OSTEOPOROSIS TYPE, UNSPECIFIED PATHOLOGICAL FRACTURE PRESENCE: ICD-10-CM

## 2019-02-22 DIAGNOSIS — Z00.00 EXAMINATION: Primary | ICD-10-CM

## 2019-02-26 ENCOUNTER — HOSPITAL ENCOUNTER (OUTPATIENT)
Dept: CT IMAGING | Facility: HOSPITAL | Age: 49
Discharge: HOME | End: 2019-02-26
Attending: INTERNAL MEDICINE
Payer: COMMERCIAL

## 2019-02-26 VITALS
HEART RATE: 81 BPM | HEIGHT: 63 IN | TEMPERATURE: 97.6 F | WEIGHT: 195 LBS | RESPIRATION RATE: 20 BRPM | OXYGEN SATURATION: 98 % | BODY MASS INDEX: 34.55 KG/M2 | SYSTOLIC BLOOD PRESSURE: 110 MMHG | DIASTOLIC BLOOD PRESSURE: 78 MMHG

## 2019-02-26 DIAGNOSIS — D75.0 POLYCYTHEMIA, FAMILIAL: ICD-10-CM

## 2019-02-26 LAB
ERYTHROCYTE [DISTWIDTH] IN BLOOD BY AUTOMATED COUNT: 12.8 % (ref 11.6–15.1)
HCT VFR BLD AUTO: 47.3 % (ref 34.8–46.1)
HGB BLD-MCNC: 15.7 G/DL (ref 11.5–15.4)
INR PPP: 0.93 (ref 0.86–1.17)
MCH RBC QN AUTO: 36.3 PG (ref 26.8–34.3)
MCHC RBC AUTO-ENTMCNC: 33.2 G/DL (ref 31.4–37.4)
MCV RBC AUTO: 109 FL (ref 82–98)
PLATELET # BLD AUTO: 207 THOUSANDS/UL (ref 149–390)
PMV BLD AUTO: 9.1 FL (ref 8.9–12.7)
PROTHROMBIN TIME: 12.6 SECONDS (ref 11.8–14.2)
RBC # BLD AUTO: 4.33 MILLION/UL (ref 3.81–5.12)
WBC # BLD AUTO: 6.48 THOUSAND/UL (ref 4.31–10.16)

## 2019-02-26 PROCEDURE — 99152 MOD SED SAME PHYS/QHP 5/>YRS: CPT | Performed by: RADIOLOGY

## 2019-02-26 PROCEDURE — 85097 BONE MARROW INTERPRETATION: CPT | Performed by: PATHOLOGY

## 2019-02-26 PROCEDURE — 88341 IMHCHEM/IMCYTCHM EA ADD ANTB: CPT | Performed by: PATHOLOGY

## 2019-02-26 PROCEDURE — 85610 PROTHROMBIN TIME: CPT | Performed by: RADIOLOGY

## 2019-02-26 PROCEDURE — 88305 TISSUE EXAM BY PATHOLOGIST: CPT | Performed by: PATHOLOGY

## 2019-02-26 PROCEDURE — 85027 COMPLETE CBC AUTOMATED: CPT | Performed by: RADIOLOGY

## 2019-02-26 PROCEDURE — 88342 IMHCHEM/IMCYTCHM 1ST ANTB: CPT | Performed by: PATHOLOGY

## 2019-02-26 PROCEDURE — 88311 DECALCIFY TISSUE: CPT | Performed by: PATHOLOGY

## 2019-02-26 PROCEDURE — 81450 HL NEO GSAP 5-50DNA/DNA&RNA: CPT | Performed by: INTERNAL MEDICINE

## 2019-02-26 PROCEDURE — 99152 MOD SED SAME PHYS/QHP 5/>YRS: CPT

## 2019-02-26 PROCEDURE — 38222 DX BONE MARROW BX & ASPIR: CPT | Performed by: RADIOLOGY

## 2019-02-26 PROCEDURE — 88313 SPECIAL STAINS GROUP 2: CPT | Performed by: PATHOLOGY

## 2019-02-26 PROCEDURE — 77012 CT SCAN FOR NEEDLE BIOPSY: CPT | Performed by: RADIOLOGY

## 2019-02-26 PROCEDURE — 88237 TISSUE CULTURE BONE MARROW: CPT | Performed by: INTERNAL MEDICINE

## 2019-02-26 PROCEDURE — 88185 FLOWCYTOMETRY/TC ADD-ON: CPT

## 2019-02-26 PROCEDURE — 88184 FLOWCYTOMETRY/ TC 1 MARKER: CPT | Performed by: INTERNAL MEDICINE

## 2019-02-26 PROCEDURE — 88262 CHROMOSOME ANALYSIS 15-20: CPT | Performed by: INTERNAL MEDICINE

## 2019-02-26 PROCEDURE — 38222 DX BONE MARROW BX & ASPIR: CPT

## 2019-02-26 PROCEDURE — 99153 MOD SED SAME PHYS/QHP EA: CPT

## 2019-02-26 RX ORDER — SODIUM CHLORIDE 9 MG/ML
75 INJECTION, SOLUTION INTRAVENOUS CONTINUOUS
Status: DISCONTINUED | OUTPATIENT
Start: 2019-02-26 | End: 2019-02-27 | Stop reason: HOSPADM

## 2019-02-26 RX ORDER — MIDAZOLAM HYDROCHLORIDE 1 MG/ML
INJECTION INTRAMUSCULAR; INTRAVENOUS CODE/TRAUMA/SEDATION MEDICATION
Status: COMPLETED | OUTPATIENT
Start: 2019-02-26 | End: 2019-02-26

## 2019-02-26 RX ORDER — FENTANYL CITRATE 50 UG/ML
INJECTION, SOLUTION INTRAMUSCULAR; INTRAVENOUS CODE/TRAUMA/SEDATION MEDICATION
Status: COMPLETED | OUTPATIENT
Start: 2019-02-26 | End: 2019-02-26

## 2019-02-26 RX ADMIN — FENTANYL CITRATE 50 MCG: 50 INJECTION INTRAMUSCULAR; INTRAVENOUS at 13:53

## 2019-02-26 RX ADMIN — MIDAZOLAM 2 MG: 1 INJECTION INTRAMUSCULAR; INTRAVENOUS at 13:45

## 2019-02-26 RX ADMIN — SODIUM CHLORIDE 75 ML/HR: 0.9 INJECTION, SOLUTION INTRAVENOUS at 12:43

## 2019-02-26 RX ADMIN — MIDAZOLAM 1 MG: 1 INJECTION INTRAMUSCULAR; INTRAVENOUS at 13:53

## 2019-02-26 RX ADMIN — FENTANYL CITRATE 50 MCG: 50 INJECTION INTRAMUSCULAR; INTRAVENOUS at 13:45

## 2019-02-26 RX ADMIN — FENTANYL CITRATE 100 MCG: 50 INJECTION INTRAMUSCULAR; INTRAVENOUS at 14:03

## 2019-02-26 RX ADMIN — MIDAZOLAM 1 MG: 1 INJECTION INTRAMUSCULAR; INTRAVENOUS at 14:03

## 2019-02-26 NOTE — BRIEF OP NOTE (RAD/CATH)
IR BONE MARROW BIOPSY/ASPIRATION  Procedure Note    PATIENT NAME: Arch Head  : 1970  MRN: [id number]     Pre-op Diagnosis:   1  Polycythemia, familial      Post-op Diagnosis:   1   Polycythemia, familial        Surgeon:   Lian Park MD    Estimated Blood Loss: minimal  Findings: Bone marrow biopsy with sedation    Specimens: asp and core     Complications:  none    Anesthesia: Conscious sedation    Lian Park MD     Date: 2019  Time: 2:18 PM

## 2019-02-26 NOTE — DISCHARGE INSTRUCTIONS
Bone Marrow Biopsy     WHAT YOU NEED TO KNOW:   A bone marrow biopsy is a procedure to remove a small amount of bone marrow from your bone  Bone marrow is the soft tissue inside your bone that helps to make blood cells  The sample is tested for disease or infection  DISCHARGE INSTRUCTIONS:     1  Limit your activities day of biopsy as directed by your doctor  2  Use medication as ordered  3  Return to your normal diet  Small sips of flat soda will help with nausea  4  Remove band-aid or dressing 24 hours after procedure  Contact Interventional Radiology at [phone] [place] PATIENTS: Contact Interventional Radiology at [phone]) Orie Dancer PATIENTS: Contact Interventional Radiology at [phone]) if:    1  Difficulty breathing, nausea or vomiting  2  Chills or fever above 101 F     3  Pain at biopsy site not relieved by medication  4  Develop any redness, swelling, heat, unusual drainage, heavy bruising or bleeding from biopsy site

## 2019-02-26 NOTE — PROGRESS NOTES
Brief pre procedure outpatient H&P    History: Polycythemia    Past Medical History:   Diagnosis Date    Migraines     Osteopenia     PVCs (premature ventricular contractions)     Seizure disorder (HCC)      Past Surgical History:   Procedure Laterality Date    APPENDECTOMY      COLONOSCOPY      NECK SURGERY      resection of a mass from the posterior neck    SHOULDER SURGERY Right     2 surgeries     Allergies   Allergen Reactions    Amitriptyline Other (See Comments)     confusion    Propranolol Other (See Comments)     Other reaction(s): Other (See Comments)  Distension abdominal     Vitals:    02/26/19 1409   BP: 116/60   Pulse: 92   Resp:    Temp:    SpO2: 100%     Physical Exam   Constitutional: She appears well-developed and well-nourished  No distress  Cardiovascular: Normal rate and regular rhythm  Pulmonary/Chest: Effort normal  No respiratory distress  A/P: Polycythemia for image guided bone marrow biopsy  Consent obtained  Questions answered

## 2019-02-28 LAB — SCAN RESULT: NORMAL

## 2019-03-05 LAB — SCAN RESULT: NORMAL

## 2019-03-09 LAB — MISCELLANEOUS LAB TEST RESULT: NORMAL

## 2019-03-12 ENCOUNTER — TELEPHONE (OUTPATIENT)
Dept: PREADMISSION TESTING | Facility: HOSPITAL | Age: 49
End: 2019-03-12

## 2019-03-13 ENCOUNTER — TELEPHONE (OUTPATIENT)
Dept: SURGERY | Facility: HOSPITAL | Age: 49
End: 2019-03-13

## 2019-03-14 ENCOUNTER — HOSPITAL ENCOUNTER (OUTPATIENT)
Dept: INTERVENTIONAL RADIOLOGY/VASCULAR | Facility: HOSPITAL | Age: 49
Discharge: HOME | End: 2019-03-14
Admitting: PHYSICAL MEDICINE & REHABILITATION
Payer: COMMERCIAL

## 2019-03-14 VITALS
OXYGEN SATURATION: 97 % | RESPIRATION RATE: 18 BRPM | HEART RATE: 78 BPM | HEIGHT: 65 IN | BODY MASS INDEX: 32.49 KG/M2 | WEIGHT: 195 LBS | TEMPERATURE: 97.6 F | DIASTOLIC BLOOD PRESSURE: 78 MMHG | SYSTOLIC BLOOD PRESSURE: 118 MMHG

## 2019-03-14 DIAGNOSIS — M54.2 CERVICALGIA: ICD-10-CM

## 2019-03-14 DIAGNOSIS — M12.88 OTHER SPECIFIC ARTHROPATHIES, NOT ELSEWHERE CLASSIFIED, OTHER SPECIFIED SITE: ICD-10-CM

## 2019-03-14 PROCEDURE — 99152 MOD SED SAME PHYS/QHP 5/>YRS: CPT

## 2019-03-14 PROCEDURE — 99153 MOD SED SAME PHYS/QHP EA: CPT

## 2019-03-14 RX ORDER — LIDOCAINE HYDROCHLORIDE 10 MG/ML
INJECTION, SOLUTION EPIDURAL; INFILTRATION; INTRACAUDAL; PERINEURAL CODE/TRAUMA/SEDATION MEDICATION
Status: COMPLETED | OUTPATIENT
Start: 2019-03-14 | End: 2019-03-14

## 2019-03-14 RX ORDER — SODIUM CHLORIDE 9 MG/ML
50 INJECTION, SOLUTION INTRAVENOUS CONTINUOUS
Status: DISCONTINUED | OUTPATIENT
Start: 2019-03-14 | End: 2019-03-18 | Stop reason: HOSPADM

## 2019-03-14 RX ORDER — FENTANYL CITRATE 50 UG/ML
INJECTION, SOLUTION INTRAMUSCULAR; INTRAVENOUS CODE/TRAUMA/SEDATION MEDICATION
Status: COMPLETED | OUTPATIENT
Start: 2019-03-14 | End: 2019-03-14

## 2019-03-14 RX ORDER — MIDAZOLAM HYDROCHLORIDE 1 MG/ML
INJECTION INTRAMUSCULAR; INTRAVENOUS CODE/TRAUMA/SEDATION MEDICATION
Status: COMPLETED | OUTPATIENT
Start: 2019-03-14 | End: 2019-03-14

## 2019-03-14 RX ORDER — BUPIVACAINE HCL/PF 2.5 MG/ML
VIAL (ML) INJECTION CODE/TRAUMA/SEDATION MEDICATION
Status: COMPLETED | OUTPATIENT
Start: 2019-03-14 | End: 2019-03-14

## 2019-03-14 RX ORDER — KETOROLAC TROMETHAMINE 30 MG/ML
INJECTION, SOLUTION INTRAMUSCULAR; INTRAVENOUS CODE/TRAUMA/SEDATION MEDICATION
Status: COMPLETED | OUTPATIENT
Start: 2019-03-14 | End: 2019-03-14

## 2019-03-14 RX ADMIN — BUPIVACAINE HYDROCHLORIDE 2 ML: 2.5 INJECTION, SOLUTION EPIDURAL; INFILTRATION; INTRACAUDAL at 11:08

## 2019-03-14 RX ADMIN — FENTANYL CITRATE 25 MCG: 50 INJECTION INTRAMUSCULAR; INTRAVENOUS at 11:09

## 2019-03-14 RX ADMIN — KETOROLAC TROMETHAMINE 30 MG: 30 INJECTION, SOLUTION INTRAMUSCULAR; INTRAVENOUS at 10:52

## 2019-03-14 RX ADMIN — MIDAZOLAM HYDROCHLORIDE 2 MG: 1 INJECTION, SOLUTION INTRAMUSCULAR; INTRAVENOUS at 10:53

## 2019-03-14 RX ADMIN — LIDOCAINE HYDROCHLORIDE 2 ML: 10 INJECTION, SOLUTION EPIDURAL; INFILTRATION; INTRACAUDAL; PERINEURAL at 11:07

## 2019-03-14 RX ADMIN — SODIUM CHLORIDE 50 ML/HR: 0.9 INJECTION, SOLUTION INTRAVENOUS at 09:00

## 2019-03-14 RX ADMIN — FENTANYL CITRATE 50 MCG: 50 INJECTION INTRAMUSCULAR; INTRAVENOUS at 10:53

## 2019-03-14 RX ADMIN — IOHEXOL 4 ML: 300 INJECTION, SOLUTION INTRAVENOUS at 11:08

## 2019-03-14 NOTE — PERIOPERATIVE NURSING NOTE
Returned from cath lab sleepy but arousable  ivs running  bandaids dry and intact  Denies pain  Eating and drinking

## 2019-03-14 NOTE — PERIOPERATIVE NURSING NOTE
ivs out  Tolerated diet  bandaids dry and intact  Wants to go home  Discharged ambulatory after discharge instructions given and verbalized an understanding of same

## 2019-03-14 NOTE — H&P
History of Present Illness: The patient is a 50 y o  female who presents with complaints of left cervical spondylosis, and facet joints at    Patient Active Problem List   Diagnosis    Migraine without aura and without status migrainosus, not intractable    Seizure disorder ([address] )    Near syncope    Polycythemia, familial    Family history of hypercoagulability       Past Medical History:   Diagnosis Date    Migraines     Osteopenia     PVCs (premature ventricular contractions)     Seizure disorder ([address] )        Past Surgical History:   Procedure Laterality Date    APPENDECTOMY      "1990's"    BONE MARROW BIOPSY W/ ASPIRATION  03/2019    COLONOSCOPY  2011    HYSTERECTOMY      IR BONE MARROW BIOPSY/ASPIRATION  2/26/2019    NECK SURGERY Right     resection of a mass from the posterior neck     SHOULDER SURGERY Right     2 surgeries         Current Outpatient Medications:     aspirin (ECOTRIN LOW STRENGTH) 81 mg EC tablet, Take 81 mg by mouth, Disp: , Rfl:     butalbital-acetaminophen-caffeine (FIORICET,ESGIC) -40 mg per tablet, By oral route take 1 tablet every 4-6 hours as needed for headache  Limit 4 tablets per 24 hr , Disp: 30 tablet, Rfl: 1    Calcium Carbonate-Vitamin D3 600-400 MG-UNIT TABS, Take 1 tablet by mouth 2 (two) times a day , Disp: , Rfl:     gabapentin (NEURONTIN) 300 mg capsule, Take 1 capsule (300 mg total) by mouth 3 (three) times a day (Patient taking differently: Take 300 mg by mouth 3 (three) times a day Recent increase that pt hasn't started   Took 100 mg of gabapentin BID 2/25/19), Disp: 270 capsule, Rfl: 1    HYDROcodone Bitartrate ER (ZOHYDRO ER) 30 MG CP12, Take by mouth 2 (two) times a day , Disp: , Rfl:     HYDROcodone-acetaminophen (NORCO) 5-325 mg per tablet, TAKE 1 TABLET 2-3 TIMES DAILY AS NEEDED, Disp: , Rfl: 0    LORazepam (ATIVAN) 0 5 mg tablet, 0 5 mg 2 (two) times a day as needed, Disp: , Rfl: 1    topiramate (TOPAMAX) 200 MG tablet, Take 1 tablet (200 mg total) by mouth 2 (two) times a day, Disp: 180 tablet, Rfl: 1    Current Facility-Administered Medications:     sodium chloride 0 9 % infusion, 50 mL/hr, Intravenous, Continuous, Eric Dixon MD, Stopped at 03/14/19 1142    Allergies   Allergen Reactions    Amitriptyline Other (See Comments)     confusion    Propranolol Other (See Comments)     Other reaction(s): Other (See Comments)  Distension abdominal       Physical Exam:   Vitals:    03/14/19 1123   BP: 118/78   Pulse: 78   Resp:    Temp: 97 6 °F (36 4 °C)   SpO2: 97%     General: Awake, Alert, Oriented x 3, Mood and affect appropriate  Respiratory: Respirations even and unlabored  Cardiovascular: Peripheral pulses intact; no edema  Musculoskeletal Exam:  Tender left cervical facets, negative Spurling's test    Assessment:   1  Cervicalgia    2   Other specific arthropathies, not elsewhere classified, other specified site        Plan:  Diagnostic left cervical medial branch blocks

## 2019-03-15 ENCOUNTER — TRANSCRIBE ORDERS (OUTPATIENT)
Dept: ADMINISTRATIVE | Facility: HOSPITAL | Age: 49
End: 2019-03-15

## 2019-03-15 DIAGNOSIS — N64.52 BREAST DISCHARGE: Primary | ICD-10-CM

## 2019-03-15 DIAGNOSIS — G43.009 MIGRAINE WITHOUT AURA AND WITHOUT STATUS MIGRAINOSUS, NOT INTRACTABLE: ICD-10-CM

## 2019-03-15 RX ORDER — BUTALBITAL, ACETAMINOPHEN AND CAFFEINE 50; 325; 40 MG/1; MG/1; MG/1
TABLET ORAL
Qty: 30 TABLET | Refills: 3 | Status: SHIPPED | OUTPATIENT
Start: 2019-03-15 | End: 2019-07-19 | Stop reason: SDUPTHER

## 2019-03-19 ENCOUNTER — TELEPHONE (OUTPATIENT)
Dept: HEMATOLOGY ONCOLOGY | Facility: CLINIC | Age: 49
End: 2019-03-19

## 2019-03-19 NOTE — TELEPHONE ENCOUNTER
Returned phone call to patient and let her know that on F/U on Mon 3/25 Dr Georgie Veras would go over her bone marrow biopsy results in depth  Patient verbalized understanding

## 2019-03-19 NOTE — TELEPHONE ENCOUNTER
Patient called regarding the results of her recent biopsy  Patient would like to be called at [phone]  Patient does have f/u apt w/Dr Jadon Montes Monday 03/25/19

## 2019-03-22 ENCOUNTER — TELEPHONE (OUTPATIENT)
Dept: HEMATOLOGY ONCOLOGY | Facility: CLINIC | Age: 49
End: 2019-03-22

## 2019-03-22 NOTE — TELEPHONE ENCOUNTER
I called the patient and I left a voicemail reminding her that she should get her blood work done prior her appointment

## 2019-03-25 ENCOUNTER — TRANSCRIBE ORDERS (OUTPATIENT)
Dept: ADMINISTRATIVE | Facility: HOSPITAL | Age: 49
End: 2019-03-25

## 2019-03-25 DIAGNOSIS — E04.0 GOITER, SIMPLE: Primary | ICD-10-CM

## 2019-03-27 ENCOUNTER — APPOINTMENT (OUTPATIENT)
Dept: LAB | Facility: HOSPITAL | Age: 49
End: 2019-03-27
Attending: INTERNAL MEDICINE
Payer: COMMERCIAL

## 2019-03-27 ENCOUNTER — HOSPITAL ENCOUNTER (OUTPATIENT)
Dept: ULTRASOUND IMAGING | Facility: HOSPITAL | Age: 49
Discharge: HOME | End: 2019-03-27
Payer: COMMERCIAL

## 2019-03-27 ENCOUNTER — TRANSCRIBE ORDERS (OUTPATIENT)
Dept: ADMINISTRATIVE | Facility: HOSPITAL | Age: 49
End: 2019-03-27

## 2019-03-27 DIAGNOSIS — E04.0 GOITER, SIMPLE: ICD-10-CM

## 2019-03-27 DIAGNOSIS — D75.0 POLYCYTHEMIA, FAMILIAL: ICD-10-CM

## 2019-03-27 LAB
ALBUMIN SERPL BCP-MCNC: 4.3 G/DL (ref 3–5.2)
ALP SERPL-CCNC: 82 U/L (ref 43–122)
ALT SERPL W P-5'-P-CCNC: 36 U/L (ref 9–52)
ANION GAP SERPL CALCULATED.3IONS-SCNC: 8 MMOL/L (ref 5–14)
AST SERPL W P-5'-P-CCNC: 32 U/L (ref 14–36)
BASOPHILS # BLD AUTO: 0 THOUSANDS/ΜL (ref 0–0.1)
BASOPHILS NFR BLD AUTO: 1 % (ref 0–1)
BILIRUB SERPL-MCNC: 0.3 MG/DL
BUN SERPL-MCNC: 14 MG/DL (ref 5–25)
CALCIUM SERPL-MCNC: 9.5 MG/DL (ref 8.4–10.2)
CHLORIDE SERPL-SCNC: 108 MMOL/L (ref 97–108)
CO2 SERPL-SCNC: 24 MMOL/L (ref 22–30)
CREAT SERPL-MCNC: 0.83 MG/DL (ref 0.6–1.2)
EOSINOPHIL # BLD AUTO: 0.1 THOUSAND/ΜL (ref 0–0.4)
EOSINOPHIL NFR BLD AUTO: 2 % (ref 0–6)
ERYTHROCYTE [DISTWIDTH] IN BLOOD BY AUTOMATED COUNT: 13.7 %
GFR SERPL CREATININE-BSD FRML MDRD: 96 ML/MIN/1.73SQ M
GLUCOSE SERPL-MCNC: 80 MG/DL (ref 70–99)
HCT VFR BLD AUTO: 49.8 % (ref 36–46)
HGB BLD-MCNC: 16.4 G/DL (ref 12–16)
LDH SERPL-CCNC: 472 U/L (ref 313–618)
LYMPHOCYTES # BLD AUTO: 2.1 THOUSANDS/ΜL (ref 0.5–4)
LYMPHOCYTES NFR BLD AUTO: 34 % (ref 25–45)
MCH RBC QN AUTO: 36.3 PG (ref 26–34)
MCHC RBC AUTO-ENTMCNC: 32.9 G/DL (ref 31–36)
MCV RBC AUTO: 110 FL (ref 80–100)
MONOCYTES # BLD AUTO: 0.4 THOUSAND/ΜL (ref 0.2–0.9)
MONOCYTES NFR BLD AUTO: 7 % (ref 1–10)
NEUTROPHILS # BLD AUTO: 3.5 THOUSANDS/ΜL (ref 1.8–7.8)
NEUTS SEG NFR BLD AUTO: 56 % (ref 45–65)
PLATELET # BLD AUTO: 212 THOUSANDS/UL (ref 150–450)
PLATELET BLD QL SMEAR: ADEQUATE
PMV BLD AUTO: 7.8 FL (ref 8.9–12.7)
POTASSIUM SERPL-SCNC: 3.8 MMOL/L (ref 3.6–5)
PROT SERPL-MCNC: 7.4 G/DL (ref 5.9–8.4)
RBC # BLD AUTO: 4.51 MILLION/UL (ref 4–5.2)
RBC MORPH BLD: NORMAL
SODIUM SERPL-SCNC: 140 MMOL/L (ref 137–147)
WBC # BLD AUTO: 6.1 THOUSAND/UL (ref 4.5–11)

## 2019-03-27 PROCEDURE — 76536 US EXAM OF HEAD AND NECK: CPT

## 2019-03-27 PROCEDURE — 85025 COMPLETE CBC W/AUTO DIFF WBC: CPT

## 2019-03-27 PROCEDURE — 83615 LACTATE (LD) (LDH) ENZYME: CPT

## 2019-03-27 PROCEDURE — 80053 COMPREHEN METABOLIC PANEL: CPT

## 2019-03-27 PROCEDURE — 36415 COLL VENOUS BLD VENIPUNCTURE: CPT

## 2019-04-03 ENCOUNTER — OFFICE VISIT (OUTPATIENT)
Dept: HEMATOLOGY ONCOLOGY | Facility: CLINIC | Age: 49
End: 2019-04-03
Payer: COMMERCIAL

## 2019-04-03 VITALS
RESPIRATION RATE: 16 BRPM | HEIGHT: 65 IN | WEIGHT: 205.4 LBS | OXYGEN SATURATION: 98 % | HEART RATE: 97 BPM | TEMPERATURE: 97.6 F | BODY MASS INDEX: 34.22 KG/M2 | DIASTOLIC BLOOD PRESSURE: 74 MMHG | SYSTOLIC BLOOD PRESSURE: 114 MMHG

## 2019-04-03 DIAGNOSIS — D75.0 POLYCYTHEMIA, FAMILIAL: Primary | ICD-10-CM

## 2019-04-03 DIAGNOSIS — Z83.2 FAMILY HISTORY OF HYPERCOAGULABILITY: ICD-10-CM

## 2019-04-03 PROCEDURE — 99214 OFFICE O/P EST MOD 30 MIN: CPT | Performed by: INTERNAL MEDICINE

## 2019-04-18 ENCOUNTER — TRANSCRIBE ORDERS (OUTPATIENT)
Dept: ADMINISTRATIVE | Facility: HOSPITAL | Age: 49
End: 2019-04-18

## 2019-04-18 DIAGNOSIS — M71.22 SYNOVIAL CYST OF LEFT POPLITEAL SPACE: ICD-10-CM

## 2019-04-18 DIAGNOSIS — I82.402 DEEP VEIN THROMBOSIS (DVT) OF LEFT LOWER EXTREMITY, UNSPECIFIED CHRONICITY, UNSPECIFIED VEIN (HCC): Primary | ICD-10-CM

## 2019-04-18 DIAGNOSIS — M81.0 SENILE OSTEOPOROSIS: ICD-10-CM

## 2019-04-30 ENCOUNTER — HOSPITAL ENCOUNTER (OUTPATIENT)
Dept: NON INVASIVE DIAGNOSTICS | Facility: HOSPITAL | Age: 49
Discharge: HOME | End: 2019-04-30
Payer: COMMERCIAL

## 2019-04-30 ENCOUNTER — HOSPITAL ENCOUNTER (OUTPATIENT)
Dept: MAMMOGRAPHY | Facility: CLINIC | Age: 49
Discharge: HOME | End: 2019-04-30
Payer: COMMERCIAL

## 2019-04-30 ENCOUNTER — HOSPITAL ENCOUNTER (OUTPATIENT)
Dept: ULTRASOUND IMAGING | Facility: HOSPITAL | Age: 49
Discharge: HOME | End: 2019-04-30
Payer: COMMERCIAL

## 2019-04-30 ENCOUNTER — HOSPITAL ENCOUNTER (OUTPATIENT)
Dept: BONE DENSITY | Facility: CLINIC | Age: 49
Discharge: HOME | End: 2019-04-30
Payer: COMMERCIAL

## 2019-04-30 VITALS — BODY MASS INDEX: 34.73 KG/M2 | WEIGHT: 196 LBS | HEIGHT: 63 IN

## 2019-04-30 DIAGNOSIS — M81.0 SENILE OSTEOPOROSIS: ICD-10-CM

## 2019-04-30 DIAGNOSIS — N64.52 BREAST DISCHARGE: ICD-10-CM

## 2019-04-30 DIAGNOSIS — R55 NEAR SYNCOPE: ICD-10-CM

## 2019-04-30 PROCEDURE — 77066 DX MAMMO INCL CAD BI: CPT

## 2019-04-30 PROCEDURE — 93226 XTRNL ECG REC<48 HR SCAN A/R: CPT

## 2019-04-30 PROCEDURE — 76642 ULTRASOUND BREAST LIMITED: CPT

## 2019-04-30 PROCEDURE — 77080 DXA BONE DENSITY AXIAL: CPT

## 2019-04-30 PROCEDURE — 93225 XTRNL ECG REC<48 HRS REC: CPT

## 2019-05-03 PROCEDURE — 93227 XTRNL ECG REC<48 HR R&I: CPT | Performed by: INTERNAL MEDICINE

## 2019-05-20 DIAGNOSIS — G43.009 MIGRAINE WITHOUT AURA AND WITHOUT STATUS MIGRAINOSUS, NOT INTRACTABLE: ICD-10-CM

## 2019-05-20 RX ORDER — BUTALBITAL, ACETAMINOPHEN AND CAFFEINE 50; 325; 40 MG/1; MG/1; MG/1
TABLET ORAL
Qty: 30 TABLET | Refills: 3 | Status: CANCELLED | OUTPATIENT
Start: 2019-05-20

## 2019-05-20 RX ORDER — BUTALBITAL, ACETAMINOPHEN AND CAFFEINE 50; 325; 40 MG/1; MG/1; MG/1
TABLET ORAL
Qty: 30 TABLET | Refills: 3 | OUTPATIENT
Start: 2019-05-20

## 2019-06-03 ENCOUNTER — OFFICE VISIT (OUTPATIENT)
Dept: NEUROLOGY | Facility: CLINIC | Age: 49
End: 2019-06-03
Payer: COMMERCIAL

## 2019-06-03 VITALS
SYSTOLIC BLOOD PRESSURE: 138 MMHG | HEIGHT: 62 IN | RESPIRATION RATE: 18 BRPM | HEART RATE: 84 BPM | DIASTOLIC BLOOD PRESSURE: 88 MMHG | WEIGHT: 205.2 LBS | BODY MASS INDEX: 37.76 KG/M2

## 2019-06-03 DIAGNOSIS — G40.909 SEIZURE DISORDER (HCC): ICD-10-CM

## 2019-06-03 DIAGNOSIS — G43.009 MIGRAINE WITHOUT AURA AND WITHOUT STATUS MIGRAINOSUS, NOT INTRACTABLE: Primary | ICD-10-CM

## 2019-06-03 PROCEDURE — 99213 OFFICE O/P EST LOW 20 MIN: CPT | Performed by: PSYCHIATRY & NEUROLOGY

## 2019-06-07 ENCOUNTER — TRANSCRIBE ORDERS (OUTPATIENT)
Dept: ADMINISTRATIVE | Facility: HOSPITAL | Age: 49
End: 2019-06-07

## 2019-06-07 ENCOUNTER — APPOINTMENT (OUTPATIENT)
Dept: LAB | Facility: HOSPITAL | Age: 49
End: 2019-06-07
Payer: COMMERCIAL

## 2019-06-07 DIAGNOSIS — J06.9 ACUTE RESPIRATORY DISEASE: ICD-10-CM

## 2019-06-07 DIAGNOSIS — R91.1 LUNG NODULE: ICD-10-CM

## 2019-06-07 DIAGNOSIS — G43.009 MIGRAINE WITHOUT AURA AND WITHOUT STATUS MIGRAINOSUS, NOT INTRACTABLE: ICD-10-CM

## 2019-06-07 DIAGNOSIS — J06.9 ACUTE RESPIRATORY DISEASE: Primary | ICD-10-CM

## 2019-06-07 LAB
ALBUMIN SERPL BCP-MCNC: 4.3 G/DL (ref 3–5.2)
ALP SERPL-CCNC: 83 U/L (ref 43–122)
ALT SERPL W P-5'-P-CCNC: 38 U/L (ref 9–52)
ANION GAP SERPL CALCULATED.3IONS-SCNC: 11 MMOL/L (ref 5–14)
AST SERPL W P-5'-P-CCNC: 32 U/L (ref 14–36)
BASOPHILS # BLD AUTO: 0 THOUSANDS/ΜL (ref 0–0.1)
BASOPHILS NFR BLD AUTO: 0 % (ref 0–1)
BILIRUB SERPL-MCNC: 0.1 MG/DL
BUN SERPL-MCNC: 14 MG/DL (ref 5–25)
CALCIUM SERPL-MCNC: 9.2 MG/DL (ref 8.4–10.2)
CHLORIDE SERPL-SCNC: 107 MMOL/L (ref 97–108)
CO2 SERPL-SCNC: 25 MMOL/L (ref 22–30)
CREAT SERPL-MCNC: 0.82 MG/DL (ref 0.6–1.2)
CRP SERPL QL: 9.5 MG/L
EOSINOPHIL # BLD AUTO: 0.1 THOUSAND/ΜL (ref 0–0.4)
EOSINOPHIL NFR BLD AUTO: 2 % (ref 0–6)
ERYTHROCYTE [DISTWIDTH] IN BLOOD BY AUTOMATED COUNT: 13.8 %
ERYTHROCYTE [SEDIMENTATION RATE] IN BLOOD: 22 MM/HOUR (ref 1–20)
GFR SERPL CREATININE-BSD FRML MDRD: 98 ML/MIN/1.73SQ M
GLUCOSE SERPL-MCNC: 79 MG/DL (ref 70–99)
HCT VFR BLD AUTO: 48.2 % (ref 36–46)
HGB BLD-MCNC: 16.4 G/DL (ref 12–16)
LYMPHOCYTES # BLD AUTO: 2.5 THOUSANDS/ΜL (ref 0.5–4)
LYMPHOCYTES NFR BLD AUTO: 35 % (ref 25–45)
MACROCYTES BLD QL AUTO: PRESENT
MCH RBC QN AUTO: 36.5 PG (ref 26–34)
MCHC RBC AUTO-ENTMCNC: 34.1 G/DL (ref 31–36)
MCV RBC AUTO: 107 FL (ref 80–100)
MONOCYTES # BLD AUTO: 0.5 THOUSAND/ΜL (ref 0.2–0.9)
MONOCYTES NFR BLD AUTO: 7 % (ref 1–10)
NEUTROPHILS # BLD AUTO: 4 THOUSANDS/ΜL (ref 1.8–7.8)
NEUTS SEG NFR BLD AUTO: 56 % (ref 45–65)
PLATELET # BLD AUTO: 230 THOUSANDS/UL (ref 150–450)
PLATELET BLD QL SMEAR: ADEQUATE
PMV BLD AUTO: 7.8 FL (ref 8.9–12.7)
POTASSIUM SERPL-SCNC: 3.8 MMOL/L (ref 3.6–5)
PROT SERPL-MCNC: 7.4 G/DL (ref 5.9–8.4)
RBC # BLD AUTO: 4.49 MILLION/UL (ref 4–5.2)
RBC MORPH BLD: NORMAL
SODIUM SERPL-SCNC: 143 MMOL/L (ref 137–147)
WBC # BLD AUTO: 7.1 THOUSAND/UL (ref 4.5–11)

## 2019-06-07 PROCEDURE — 85025 COMPLETE CBC W/AUTO DIFF WBC: CPT

## 2019-06-07 PROCEDURE — 86480 TB TEST CELL IMMUN MEASURE: CPT

## 2019-06-07 PROCEDURE — 36415 COLL VENOUS BLD VENIPUNCTURE: CPT

## 2019-06-07 PROCEDURE — 85652 RBC SED RATE AUTOMATED: CPT

## 2019-06-07 PROCEDURE — 86606 ASPERGILLUS ANTIBODY: CPT

## 2019-06-07 PROCEDURE — 86140 C-REACTIVE PROTEIN: CPT

## 2019-06-07 PROCEDURE — 80053 COMPREHEN METABOLIC PANEL: CPT

## 2019-06-07 PROCEDURE — 80201 ASSAY OF TOPIRAMATE: CPT

## 2019-06-07 PROCEDURE — 82164 ANGIOTENSIN I ENZYME TEST: CPT

## 2019-06-10 LAB — TOPIRAMATE SERPL-MCNC: 5.5 UG/ML (ref 2–25)

## 2019-06-11 LAB
A FLAVUS AB SER QL ID: NEGATIVE
A FUMIGATUS AB SER QL ID: NEGATIVE
A NIGER AB SER QL ID: NEGATIVE
GAMMA INTERFERON BACKGROUND BLD IA-ACNC: 0.03 IU/ML
M TB IFN-G BLD-IMP: NEGATIVE
M TB IFN-G CD4+ BCKGRND COR BLD-ACNC: 0.03 IU/ML
M TB IFN-G CD4+ BCKGRND COR BLD-ACNC: 0.04 IU/ML
MITOGEN IGNF BCKGRD COR BLD-ACNC: >10 IU/ML

## 2019-06-12 LAB — ACE SERPL-CCNC: 24 U/L (ref 14–82)

## 2019-06-19 ENCOUNTER — TELEPHONE (OUTPATIENT)
Dept: SURGERY | Facility: HOSPITAL | Age: 49
End: 2019-06-19

## 2019-06-19 RX ORDER — SODIUM CHLORIDE 9 MG/ML
50 INJECTION, SOLUTION INTRAVENOUS CONTINUOUS
Status: CANCELLED | OUTPATIENT
Start: 2019-06-19

## 2019-06-26 ENCOUNTER — TELEPHONE (OUTPATIENT)
Dept: SURGERY | Facility: HOSPITAL | Age: 49
End: 2019-06-26

## 2019-06-26 RX ORDER — SODIUM CHLORIDE 9 MG/ML
50 INJECTION, SOLUTION INTRAVENOUS CONTINUOUS
Status: CANCELLED | OUTPATIENT
Start: 2019-06-26

## 2019-06-27 ENCOUNTER — HOSPITAL ENCOUNTER (OUTPATIENT)
Dept: INTERVENTIONAL RADIOLOGY/VASCULAR | Facility: HOSPITAL | Age: 49
Discharge: HOME | End: 2019-06-27
Admitting: PHYSICAL MEDICINE & REHABILITATION
Payer: COMMERCIAL

## 2019-06-27 VITALS
RESPIRATION RATE: 48 BRPM | TEMPERATURE: 97.9 F | OXYGEN SATURATION: 100 % | HEART RATE: 100 BPM | SYSTOLIC BLOOD PRESSURE: 128 MMHG | DIASTOLIC BLOOD PRESSURE: 84 MMHG

## 2019-06-27 DIAGNOSIS — M12.88 OTHER SPECIFIC ARTHROPATHIES, NOT ELSEWHERE CLASSIFIED, OTHER SPECIFIED SITE: ICD-10-CM

## 2019-06-27 DIAGNOSIS — M54.2 CERVICALGIA: ICD-10-CM

## 2019-06-27 PROCEDURE — 99153 MOD SED SAME PHYS/QHP EA: CPT

## 2019-06-27 PROCEDURE — 99152 MOD SED SAME PHYS/QHP 5/>YRS: CPT

## 2019-06-27 RX ORDER — FENTANYL CITRATE 50 UG/ML
INJECTION, SOLUTION INTRAMUSCULAR; INTRAVENOUS CODE/TRAUMA/SEDATION MEDICATION
Status: COMPLETED | OUTPATIENT
Start: 2019-06-27 | End: 2019-06-27

## 2019-06-27 RX ORDER — LIDOCAINE HYDROCHLORIDE 10 MG/ML
INJECTION, SOLUTION EPIDURAL; INFILTRATION; INTRACAUDAL; PERINEURAL CODE/TRAUMA/SEDATION MEDICATION
Status: COMPLETED | OUTPATIENT
Start: 2019-06-27 | End: 2019-06-27

## 2019-06-27 RX ORDER — MIDAZOLAM HYDROCHLORIDE 1 MG/ML
INJECTION INTRAMUSCULAR; INTRAVENOUS CODE/TRAUMA/SEDATION MEDICATION
Status: COMPLETED | OUTPATIENT
Start: 2019-06-27 | End: 2019-06-27

## 2019-06-27 RX ORDER — SODIUM CHLORIDE 9 MG/ML
50 INJECTION, SOLUTION INTRAVENOUS CONTINUOUS
Status: DISCONTINUED | OUTPATIENT
Start: 2019-06-27 | End: 2019-07-01 | Stop reason: HOSPADM

## 2019-06-27 RX ORDER — KETOROLAC TROMETHAMINE 30 MG/ML
INJECTION, SOLUTION INTRAMUSCULAR; INTRAVENOUS CODE/TRAUMA/SEDATION MEDICATION
Status: COMPLETED | OUTPATIENT
Start: 2019-06-27 | End: 2019-06-27

## 2019-06-27 RX ADMIN — MIDAZOLAM HYDROCHLORIDE 2 MG: 1 INJECTION, SOLUTION INTRAMUSCULAR; INTRAVENOUS at 10:21

## 2019-06-27 RX ADMIN — FENTANYL CITRATE 50 MCG: 50 INJECTION INTRAMUSCULAR; INTRAVENOUS at 10:22

## 2019-06-27 RX ADMIN — SODIUM BICARBONATE 5 MEQ: 84 INJECTION, SOLUTION INTRAVENOUS at 10:27

## 2019-06-27 RX ADMIN — KETOROLAC TROMETHAMINE 30 MG: 30 INJECTION, SOLUTION INTRAMUSCULAR; INTRAVENOUS at 10:22

## 2019-06-27 RX ADMIN — SODIUM CHLORIDE 50 ML/HR: 9 INJECTION, SOLUTION INTRAVENOUS at 09:01

## 2019-06-27 RX ADMIN — FENTANYL CITRATE 50 MCG: 50 INJECTION INTRAMUSCULAR; INTRAVENOUS at 10:26

## 2019-06-27 RX ADMIN — LIDOCAINE HYDROCHLORIDE 5 ML: 10 INJECTION, SOLUTION EPIDURAL; INFILTRATION; INTRACAUDAL; PERINEURAL at 10:27

## 2019-06-27 RX ADMIN — MIDAZOLAM HYDROCHLORIDE 1 MG: 1 INJECTION, SOLUTION INTRAMUSCULAR; INTRAVENOUS at 10:26

## 2019-06-27 NOTE — H&P
History of Present Illness: The patient is a 52 y o  female who presents with complaints of left cervicalgia, left cervical facet joint syndrome, positive response to diagnostic left cervical medial branch block    Patient Active Problem List   Diagnosis    Migraine without aura and without status migrainosus, not intractable    Seizure disorder (HCC)    Near syncope    Polycythemia, familial    Family history of hypercoagulability       Past Medical History:   Diagnosis Date    Migraines     Osteopenia     PVCs (premature ventricular contractions)     Seizure disorder ([address] )        Past Surgical History:   Procedure Laterality Date    APPENDECTOMY      "1990's"    BONE MARROW BIOPSY W/ ASPIRATION  03/2019    COLONOSCOPY  2011    HYSTERECTOMY      IR BONE MARROW BIOPSY/ASPIRATION  2/26/2019    NECK SURGERY Right     resection of a mass from the posterior neck     SHOULDER SURGERY Right     2 surgeries         Current Outpatient Medications:     Calcium Carbonate-Vitamin D3 600-400 MG-UNIT TABS, Take 1 tablet by mouth 2 (two) times a day , Disp: , Rfl:     gabapentin (NEURONTIN) 300 mg capsule, Take 1 capsule (300 mg total) by mouth 3 (three) times a day (Patient taking differently: Take 300 mg by mouth 3 (three) times a day Recent increase that pt hasn't started   Took 100 mg of gabapentin BID 2/25/19), Disp: 270 capsule, Rfl: 1    HYDROcodone Bitartrate ER (ZOHYDRO ER) 30 MG CP12, Take by mouth 2 (two) times a day , Disp: , Rfl:     HYDROcodone-acetaminophen (NORCO) 5-325 mg per tablet, TAKE 1 TABLET 2-3 TIMES DAILY AS NEEDED, Disp: , Rfl: 0    LORazepam (ATIVAN) 0 5 mg tablet, 0 5 mg 2 (two) times a day as needed, Disp: , Rfl: 1    topiramate (TOPAMAX) 200 MG tablet, Take 1 tablet (200 mg total) by mouth 2 (two) times a day, Disp: 180 tablet, Rfl: 1    aspirin (ECOTRIN LOW STRENGTH) 81 mg EC tablet, Take 81 mg by mouth, Disp: , Rfl:     butalbital-acetaminophen-caffeine ([address] [facility]) -40 mg per tablet, BY ORAL ROUTE TAKE 1 TABLET EVERY 4-6 HOURS AS NEEDED FOR HEADACHE  LIMIT 4 TABLETS PER 24 HR , Disp: 30 tablet, Rfl: 3    Current Facility-Administered Medications:     sodium chloride 0 9 % infusion, 50 mL/hr, Intravenous, Continuous, Eric Dixon MD, Last Rate: 50 mL/hr at 06/27/19 0901, 50 mL/hr at 06/27/19 0901    Allergies   Allergen Reactions    Amitriptyline Other (See Comments)     confusion    Propranolol Other (See Comments)     Other reaction(s): Other (See Comments)  Distension abdominal       Physical Exam:   Vitals:    06/27/19 0840   BP: 104/66   Pulse: (!) 107   Resp: 20   Temp: 97 9 °F (36 6 °C)   SpO2: 100%     General: Awake, Alert, Oriented x 3, Mood and affect appropriate  Respiratory: Respirations even and unlabored  Cardiovascular: Peripheral pulses intact; no edema  Musculoskeletal Exam:  Tender left cervical facet joints, positive cervical facet loading test left side      Assessment:   1  Cervicalgia    2   Other specific arthropathies, not elsewhere classified, other specified site        Plan:  Left cervical facet rhizotomy thermal ablation

## 2019-07-19 ENCOUNTER — TELEPHONE (OUTPATIENT)
Dept: NEUROLOGY | Facility: CLINIC | Age: 49
End: 2019-07-19

## 2019-07-19 DIAGNOSIS — G43.009 MIGRAINE WITHOUT AURA AND WITHOUT STATUS MIGRAINOSUS, NOT INTRACTABLE: ICD-10-CM

## 2019-07-22 ENCOUNTER — TELEPHONE (OUTPATIENT)
Dept: NEUROLOGY | Facility: CLINIC | Age: 49
End: 2019-07-22

## 2019-07-22 RX ORDER — BUTALBITAL, ACETAMINOPHEN AND CAFFEINE 50; 325; 40 MG/1; MG/1; MG/1
TABLET ORAL
Qty: 30 TABLET | Refills: 1 | Status: SHIPPED | OUTPATIENT
Start: 2019-07-22 | End: 2019-10-01 | Stop reason: SDUPTHER

## 2019-07-22 NOTE — TELEPHONE ENCOUNTER
Per Dr Addy De Anda I called the patient and informed her to please return my call about her medications   I left my name and number

## 2019-07-22 NOTE — TELEPHONE ENCOUNTER
Patient returned Dr Sophie Mckeon call and he spoke with patient and released her Fioricet   Pharmacist was then called by Dr Sophie Mckeon and gave the ok to release the medication to the patient

## 2019-07-22 NOTE — TELEPHONE ENCOUNTER
Spoke with pharmacist   Apparently patient getting these other scripts through other physicians  As result have put a hold on her Fioricet prescription and will need the office here to contact the patient to find out exactly what she is taking and how she is taking it

## 2019-07-22 NOTE — TELEPHONE ENCOUNTER
Received a call from the pharmacist @ [facility]  He wanted to confirm that Dr Charles Sanchez is aware that the patient is taking multiple meds from multiple doctors  She is taking percocet 5/325, cough syrup with codeine 10 mL Q4, and lorazepam 0 5 prn  He would like a call back before dispensing fioricet    [phone]

## 2019-09-12 ENCOUNTER — OFFICE VISIT (OUTPATIENT)
Dept: NEUROLOGY | Facility: CLINIC | Age: 49
End: 2019-09-12
Payer: COMMERCIAL

## 2019-09-12 VITALS
DIASTOLIC BLOOD PRESSURE: 81 MMHG | BODY MASS INDEX: 36.36 KG/M2 | SYSTOLIC BLOOD PRESSURE: 135 MMHG | RESPIRATION RATE: 18 BRPM | HEART RATE: 97 BPM | WEIGHT: 205.2 LBS | HEIGHT: 63 IN

## 2019-09-12 DIAGNOSIS — G40.909 SEIZURE DISORDER (HCC): ICD-10-CM

## 2019-09-12 DIAGNOSIS — G43.009 MIGRAINE WITHOUT AURA AND WITHOUT STATUS MIGRAINOSUS, NOT INTRACTABLE: ICD-10-CM

## 2019-09-12 PROCEDURE — 99213 OFFICE O/P EST LOW 20 MIN: CPT | Performed by: PSYCHIATRY & NEUROLOGY

## 2019-09-12 RX ORDER — GABAPENTIN 300 MG/1
300 CAPSULE ORAL 3 TIMES DAILY
Qty: 270 CAPSULE | Refills: 1 | Status: SHIPPED | OUTPATIENT
Start: 2019-09-12 | End: 2020-03-12 | Stop reason: SDUPTHER

## 2019-09-12 NOTE — LETTER
September 12, 2019     Augustus Gore MD  [facility]  [address]    Patient: Pati Lee   YOB: 1970   Date of Visit: 9/12/2019       Dear Dr Quezada Printers:    Thank you for referring Pati Lee to me for evaluation  Below are my notes for this consultation  If you have questions, please do not hesitate to call me  I look forward to following your patient along with you  Sincerely,        Sherlyn Anderson MD        CC: No Recipients  Sherlyn Anderson MD  9/12/2019 12:51 PM  Sign at close encounter  Patient ID: Pati Lee is a 52 y o  female  Assessment/Plan:    Migraine without aura and without status migrainosus, not intractable  Continues to do well on a combination of topiramate and gabapentin  Again will have perhaps 2 migraines on a monthly basis and fortunately these respond very nicely to the judicious use of her Fioricet  Expressing no symptoms that would suggest any adverse side effects from the topiramate or the gabapentin  Recent blood work demonstrates fortunately return to normal with regard to her chloride and CO2 most recently at 17 and 25 respectively  As result, do not feel the need for any downward dose adjustment, especially since she is doing well  --continue topiramate 200 mg twice daily  --continue gabapentin 300 mg 3 times daily  --she will continue to use her Fioricet on a judicious basis, with the knowledge that overuse can actually aggravate her headaches  --she is already scheduled for an upcoming CBC and CMP in December through Hematology  I will be requesting the addition of a topiramate level  Seizure disorder ([address] )  Also doing well  No seizure or seizure-like activity has been reported in the recent past, last recorded event August 30, 2010   --continues her AED coverage as noted above  She will follow up in 6 months or as needed      Subjective:    HPI  Patient, now 52years of age, is followed primarily for her history of migraine but also with a history of a seizure disorder  From the standpoint of her migraines, she has done well on a combination of topiramate and gabapentin  She continues to experience on average of 2 migraines on a monthly basis which are responsive to the judicious use of Fioricet  With regard to her seizures, she has remained free of any seizure or seizure-like activity since her last recorded event on August 30, 2010  She expresses no symptoms that would suggest any adverse side effects from the topiramate or the gabapentin  She did have her follow-up blood work performed in June  Results reviewed  CMP on this occasion was normal and specifically with the normalized chloride of 107 and CO2 25  CBC with hemoglobin 16 4, hematocrit 48 2, white count 7 10 and platelet count 726  Topiramate level 5 5        Past Medical History:   Diagnosis Date    Migraines     Osteopenia     PVCs (premature ventricular contractions)     Seizure disorder [facility])      Past Surgical History:   Procedure Laterality Date    APPENDECTOMY      "1990's"    BONE MARROW BIOPSY W/ ASPIRATION  03/2019    COLONOSCOPY  2011    HYSTERECTOMY      IR BONE MARROW BIOPSY/ASPIRATION  2/26/2019    NECK SURGERY Right     resection of a mass from the posterior neck     SHOULDER SURGERY Right     2 surgeries     Social History     Socioeconomic History    Marital status: /Civil Union     Spouse name: None    Number of children: None    Years of education: None    Highest education level: None   Occupational History    None   Social Needs    Financial resource strain: None    Food insecurity:     Worry: None     Inability: None    Transportation needs:     Medical: None     Non-medical: None   Tobacco Use    Smoking status: Current Every Day Smoker     Packs/day: 0 50    Smokeless tobacco: Never Used   Substance and Sexual Activity    Alcohol use: No    Drug use: No    Sexual activity: Yes   Lifestyle    Physical activity:     Days per week: None     Minutes per session: None    Stress: None   Relationships    Social connections:     Talks on phone: None     Gets together: None     Attends Restoration service: None     Active member of club or organization: None     Attends meetings of clubs or organizations: None     Relationship status: None    Intimate partner violence:     Fear of current or ex partner: None     Emotionally abused: None     Physically abused: None     Forced sexual activity: None   Other Topics Concern    None   Social History Narrative    None     Family History   Problem Relation Age of Onset    Hypertension Family     Migraines Family     Hypertension Father     Clotting disorder Brother     Ovarian cancer Maternal Grandmother 72    Breast cancer Paternal Aunt     Breast cancer Cousin 46    Breast cancer Cousin 39    Breast cancer Cousin 46     Allergies   Allergen Reactions    Amitriptyline Other (See Comments)     confusion    Propranolol Other (See Comments)     Other reaction(s):  Other (See Comments)  Distension abdominal       Current Outpatient Medications:     aspirin (ECOTRIN LOW STRENGTH) 81 mg EC tablet, Take 81 mg by mouth, Disp: , Rfl:     butalbital-acetaminophen-caffeine (FIORICET,ESGIC) -40 mg per tablet, BY ORAL ROUTE TAKE 1 TABLET EVERY 4-6 HOURS AS NEEDED FOR HEADACHE  LIMIT 4 TABLETS PER 24 HR , Disp: 30 tablet, Rfl: 1    Calcium Carbonate-Vitamin D3 600-400 MG-UNIT TABS, Take 1 tablet by mouth 2 (two) times a day , Disp: , Rfl:     gabapentin (NEURONTIN) 300 mg capsule, Take 1 capsule (300 mg total) by mouth 3 (three) times a day, Disp: 270 capsule, Rfl: 1    HYDROcodone Bitartrate ER (ZOHYDRO ER) 30 MG CP12, Take by mouth 2 (two) times a day , Disp: , Rfl:     HYDROcodone-acetaminophen (NORCO) 5-325 mg per tablet, TAKE 1 TABLET 2-3 TIMES DAILY AS NEEDED, Disp: , Rfl: 0    LORazepam (ATIVAN) 0 5 mg tablet, 0 5 mg 2 (two) times a day as needed, Disp: , Rfl: 1    topiramate (TOPAMAX) 200 MG tablet, Take 1 tablet (200 mg total) by mouth 2 (two) times a day, Disp: 180 tablet, Rfl: 1    Objective:    Blood pressure 135/81, pulse 97, resp  rate 18, height 5' 3" (1 6 m), weight 93 1 kg (205 lb 3 2 oz)  Physical Exam  Head normocephalic  Eyes nonicteric  No audible head or anterior neck bruits  Lungs clear to auscultation  Rhythm regular  GI (abdomen) soft nontender  Bowel sounds present  No significant lower extremity edema  Neurological Exam  Alert  Pleasantly interactive  Fully oriented  No dysarthria  Unremarkable spontaneous gait  Cranial nerves 2-12 tested and grossly intact  Funduscopic examination with marginated discs bilaterally  Full symmetrical strength throughout the 4 extremities with no upper extremity drift  Muscle stretch reflexes bilaterally 1 throughout the upper extremities, at the knees and at the ankles  Toe response downgoing bilaterally  ROS:    Review of Systems   Constitutional: Negative  Negative for appetite change and fever  HENT: Negative  Negative for hearing loss, tinnitus, trouble swallowing and voice change  Eyes: Negative  Negative for photophobia and pain  Respiratory: Negative  Negative for shortness of breath  Cardiovascular: Negative  Negative for palpitations  Gastrointestinal: Positive for constipation  Negative for nausea and vomiting  Endocrine: Negative  Negative for cold intolerance and heat intolerance  Genitourinary: Negative  Negative for dysuria, frequency and urgency  Musculoskeletal: Negative  Negative for myalgias and neck pain  Skin: Negative  Negative for rash  Allergic/Immunologic: Negative  Neurological: Positive for numbness and headaches  Negative for dizziness, tremors, seizures, syncope, facial asymmetry, speech difficulty, weakness and light-headedness  Hematological: Negative  Does not bruise/bleed easily  Psychiatric/Behavioral: Positive for sleep disturbance  Negative for confusion and hallucinations  The patient is nervous/anxious  I personally reviewed the ROS that was entered by the medical assistant  *Please note this document was created using voice recognition software and may contain sound-alike word errors  *

## 2019-09-12 NOTE — PROGRESS NOTES
Patient ID: Jessenia Peng is a 52 y o  female  Assessment/Plan:    Migraine without aura and without status migrainosus, not intractable  Continues to do well on a combination of topiramate and gabapentin  Again will have perhaps 2 migraines on a monthly basis and fortunately these respond very nicely to the judicious use of her Fioricet  Expressing no symptoms that would suggest any adverse side effects from the topiramate or the gabapentin  Recent blood work demonstrates fortunately return to normal with regard to her chloride and CO2 most recently at 17 and 25 respectively  As result, do not feel the need for any downward dose adjustment, especially since she is doing well  --continue topiramate 200 mg twice daily  --continue gabapentin 300 mg 3 times daily  --she will continue to use her Fioricet on a judicious basis, with the knowledge that overuse can actually aggravate her headaches  --she is already scheduled for an upcoming CBC and CMP in December through Hematology  I will be requesting the addition of a topiramate level  Seizure disorder ([address] )  Also doing well  No seizure or seizure-like activity has been reported in the recent past, last recorded event August 30, 2010   --continues her AED coverage as noted above  She will follow up in 6 months or as needed  Subjective:    HPI  Patient, now 52years of age, is followed primarily for her history of migraine but also with a history of a seizure disorder  From the standpoint of her migraines, she has done well on a combination of topiramate and gabapentin  She continues to experience on average of 2 migraines on a monthly basis which are responsive to the judicious use of Fioricet  With regard to her seizures, she has remained free of any seizure or seizure-like activity since her last recorded event on August 30, 2010    She expresses no symptoms that would suggest any adverse side effects from the topiramate or the gabapentin  She did have her follow-up blood work performed in June  Results reviewed  CMP on this occasion was normal and specifically with the normalized chloride of 107 and CO2 25  CBC with hemoglobin 16 4, hematocrit 48 2, white count 7 10 and platelet count 165  Topiramate level 5 5        Past Medical History:   Diagnosis Date    Migraines     Osteopenia     PVCs (premature ventricular contractions)     Seizure disorder [facility])      Past Surgical History:   Procedure Laterality Date    APPENDECTOMY      "1990's"    BONE MARROW BIOPSY W/ ASPIRATION  03/2019    COLONOSCOPY  2011    HYSTERECTOMY      IR BONE MARROW BIOPSY/ASPIRATION  2/26/2019    NECK SURGERY Right     resection of a mass from the posterior neck     SHOULDER SURGERY Right     2 surgeries     Social History     Socioeconomic History    Marital status: /Civil Union     Spouse name: None    Number of children: None    Years of education: None    Highest education level: None   Occupational History    None   Social Needs    Financial resource strain: None    Food insecurity:     Worry: None     Inability: None    Transportation needs:     Medical: None     Non-medical: None   Tobacco Use    Smoking status: Current Every Day Smoker     Packs/day: 0 50    Smokeless tobacco: Never Used   Substance and Sexual Activity    Alcohol use: No    Drug use: No    Sexual activity: Yes   Lifestyle    Physical activity:     Days per week: None     Minutes per session: None    Stress: None   Relationships    Social connections:     Talks on phone: None     Gets together: None     Attends Denominational service: None     Active member of club or organization: None     Attends meetings of clubs or organizations: None     Relationship status: None    Intimate partner violence:     Fear of current or ex partner: None     Emotionally abused: None     Physically abused: None     Forced sexual activity: None   Other Topics Concern    None Social History Narrative    None     Family History   Problem Relation Age of Onset    Hypertension Family     Migraines Family     Hypertension Father     Clotting disorder Brother     Ovarian cancer Maternal Grandmother 72    Breast cancer Paternal Aunt     Breast cancer Cousin 46    Breast cancer Cousin 39    Breast cancer Cousin 46     Allergies   Allergen Reactions    Amitriptyline Other (See Comments)     confusion    Propranolol Other (See Comments)     Other reaction(s): Other (See Comments)  Distension abdominal       Current Outpatient Medications:     aspirin (ECOTRIN LOW STRENGTH) 81 mg EC tablet, Take 81 mg by mouth, Disp: , Rfl:     butalbital-acetaminophen-caffeine (FIORICET,ESGIC) -40 mg per tablet, BY ORAL ROUTE TAKE 1 TABLET EVERY 4-6 HOURS AS NEEDED FOR HEADACHE  LIMIT 4 TABLETS PER 24 HR , Disp: 30 tablet, Rfl: 1    Calcium Carbonate-Vitamin D3 600-400 MG-UNIT TABS, Take 1 tablet by mouth 2 (two) times a day , Disp: , Rfl:     gabapentin (NEURONTIN) 300 mg capsule, Take 1 capsule (300 mg total) by mouth 3 (three) times a day, Disp: 270 capsule, Rfl: 1    HYDROcodone Bitartrate ER (ZOHYDRO ER) 30 MG CP12, Take by mouth 2 (two) times a day , Disp: , Rfl:     HYDROcodone-acetaminophen (NORCO) 5-325 mg per tablet, TAKE 1 TABLET 2-3 TIMES DAILY AS NEEDED, Disp: , Rfl: 0    LORazepam (ATIVAN) 0 5 mg tablet, 0 5 mg 2 (two) times a day as needed, Disp: , Rfl: 1    topiramate (TOPAMAX) 200 MG tablet, Take 1 tablet (200 mg total) by mouth 2 (two) times a day, Disp: 180 tablet, Rfl: 1    Objective:    Blood pressure 135/81, pulse 97, resp  rate 18, height 5' 3" (1 6 m), weight 93 1 kg (205 lb 3 2 oz)  Physical Exam  Head normocephalic  Eyes nonicteric  No audible head or anterior neck bruits  Lungs clear to auscultation  Rhythm regular  GI (abdomen) soft nontender  Bowel sounds present  No significant lower extremity edema  Neurological Exam  Alert    Pleasantly interactive  Fully oriented  No dysarthria  Unremarkable spontaneous gait  Cranial nerves 2-12 tested and grossly intact  Funduscopic examination with marginated discs bilaterally  Full symmetrical strength throughout the 4 extremities with no upper extremity drift  Muscle stretch reflexes bilaterally 1 throughout the upper extremities, at the knees and at the ankles  Toe response downgoing bilaterally  ROS:    Review of Systems   Constitutional: Negative  Negative for appetite change and fever  HENT: Negative  Negative for hearing loss, tinnitus, trouble swallowing and voice change  Eyes: Negative  Negative for photophobia and pain  Respiratory: Negative  Negative for shortness of breath  Cardiovascular: Negative  Negative for palpitations  Gastrointestinal: Positive for constipation  Negative for nausea and vomiting  Endocrine: Negative  Negative for cold intolerance and heat intolerance  Genitourinary: Negative  Negative for dysuria, frequency and urgency  Musculoskeletal: Negative  Negative for myalgias and neck pain  Skin: Negative  Negative for rash  Allergic/Immunologic: Negative  Neurological: Positive for numbness and headaches  Negative for dizziness, tremors, seizures, syncope, facial asymmetry, speech difficulty, weakness and light-headedness  Hematological: Negative  Does not bruise/bleed easily  Psychiatric/Behavioral: Positive for sleep disturbance  Negative for confusion and hallucinations  The patient is nervous/anxious  I personally reviewed the ROS that was entered by the medical assistant  *Please note this document was created using voice recognition software and may contain sound-alike word errors  *

## 2019-09-12 NOTE — ASSESSMENT & PLAN NOTE
Also doing well  No seizure or seizure-like activity has been reported in the recent past, last recorded event August 30, 2010   --continues her AED coverage as noted above

## 2019-09-12 NOTE — ASSESSMENT & PLAN NOTE
Continues to do well on a combination of topiramate and gabapentin  Again will have perhaps 2 migraines on a monthly basis and fortunately these respond very nicely to the judicious use of her Fioricet  Expressing no symptoms that would suggest any adverse side effects from the topiramate or the gabapentin  Recent blood work demonstrates fortunately return to normal with regard to her chloride and CO2 most recently at 17 and 25 respectively  As result, do not feel the need for any downward dose adjustment, especially since she is doing well  --continue topiramate 200 mg twice daily  --continue gabapentin 300 mg 3 times daily  --she will continue to use her Fioricet on a judicious basis, with the knowledge that overuse can actually aggravate her headaches  --she is already scheduled for an upcoming CBC and CMP in December through Hematology  I will be requesting the addition of a topiramate level

## 2019-09-26 ENCOUNTER — TELEPHONE (OUTPATIENT)
Dept: HEMATOLOGY ONCOLOGY | Facility: CLINIC | Age: 49
End: 2019-09-26

## 2019-09-26 NOTE — TELEPHONE ENCOUNTER
Called patient to reschedule f/u apt on Thursday 10/03/19 at 1:00 w/Dr Reji Stokes, as Dr Reji Stokes is in [facility] [facility] on Thursdays  Left message for pt to please call the office to reschedule

## 2019-09-30 ENCOUNTER — TELEPHONE (OUTPATIENT)
Dept: HEMATOLOGY ONCOLOGY | Facility: CLINIC | Age: 49
End: 2019-09-30

## 2019-09-30 NOTE — TELEPHONE ENCOUNTER
Called patient to reschedule f/u apt on Thursday 10/03 as Dr Lazaro Burnett is in [place] on Thursdays, and pt also did not complete her labs  Patient rescheduled her f/u apt for Wed 10/23 at 3:40 w/Dr Lazaro Burnett  Pt states she will go have her labs done

## 2019-10-01 DIAGNOSIS — G43.009 MIGRAINE WITHOUT AURA AND WITHOUT STATUS MIGRAINOSUS, NOT INTRACTABLE: ICD-10-CM

## 2019-10-01 RX ORDER — BUTALBITAL, ACETAMINOPHEN AND CAFFEINE 50; 325; 40 MG/1; MG/1; MG/1
TABLET ORAL
Qty: 30 TABLET | Refills: 0 | Status: SHIPPED | OUTPATIENT
Start: 2019-10-01 | End: 2019-11-22 | Stop reason: SDUPTHER

## 2019-10-01 NOTE — TELEPHONE ENCOUNTER
Medication refill check list    Correct patient? yes   Correct medication name, dose, and pill size? yes   Correct provider? yes   Last and Next appt  scheduled? Yes, last date 09/12/19 & next date 03/12/20   Right pharmacy listed? yes   Correct quantity for 30 or 90 days? yes   Is the patient out of refills? When was it last prescribed? Yes, last date 07/22/19   Directions match what the patient says they are taking?  yes   Enough refills? (none for controlled substances, 1 year for routine medications) yes     Patient left message on med refill line for butalbital-acetaminophen-caffeine (FIORICET,ESGIC) -40 mg per tablet

## 2019-10-21 ENCOUNTER — TELEPHONE (OUTPATIENT)
Dept: HEMATOLOGY ONCOLOGY | Facility: CLINIC | Age: 49
End: 2019-10-21

## 2019-10-21 DIAGNOSIS — D75.0 POLYCYTHEMIA, FAMILIAL: Primary | ICD-10-CM

## 2019-10-23 ENCOUNTER — TELEPHONE (OUTPATIENT)
Dept: HEMATOLOGY ONCOLOGY | Facility: CLINIC | Age: 49
End: 2019-10-23

## 2019-10-23 NOTE — TELEPHONE ENCOUNTER
Pt called to reschedule her 3:40 f/u apt today 10/23/19 w/Dr Kristin Wood as she did not complete her labs, and she's not feeling well  Pt rescheduled her apt for Wed 11/06 at 4:00 w/Dr Kristin Wood

## 2019-11-04 ENCOUNTER — APPOINTMENT (OUTPATIENT)
Dept: LAB | Facility: HOSPITAL | Age: 49
End: 2019-11-04
Payer: COMMERCIAL

## 2019-11-04 DIAGNOSIS — D75.0 POLYCYTHEMIA, FAMILIAL: ICD-10-CM

## 2019-11-04 DIAGNOSIS — G40.909 SEIZURE DISORDER (HCC): ICD-10-CM

## 2019-11-04 DIAGNOSIS — G43.009 MIGRAINE WITHOUT AURA AND WITHOUT STATUS MIGRAINOSUS, NOT INTRACTABLE: ICD-10-CM

## 2019-11-04 LAB
ALBUMIN SERPL BCP-MCNC: 4.3 G/DL (ref 3–5.2)
ALP SERPL-CCNC: 65 U/L (ref 43–122)
ALT SERPL W P-5'-P-CCNC: 27 U/L (ref 9–52)
ANION GAP SERPL CALCULATED.3IONS-SCNC: 8 MMOL/L (ref 5–14)
AST SERPL W P-5'-P-CCNC: 24 U/L (ref 14–36)
BASOPHILS # BLD AUTO: 0 THOUSANDS/ΜL (ref 0–0.1)
BASOPHILS NFR BLD AUTO: 1 % (ref 0–1)
BILIRUB SERPL-MCNC: 0.4 MG/DL
BUN SERPL-MCNC: 16 MG/DL (ref 5–25)
CALCIUM SERPL-MCNC: 9.8 MG/DL (ref 8.4–10.2)
CHLORIDE SERPL-SCNC: 112 MMOL/L (ref 97–108)
CO2 SERPL-SCNC: 22 MMOL/L (ref 22–30)
CREAT SERPL-MCNC: 0.78 MG/DL (ref 0.6–1.2)
EOSINOPHIL # BLD AUTO: 0.1 THOUSAND/ΜL (ref 0–0.4)
EOSINOPHIL NFR BLD AUTO: 1 % (ref 0–6)
ERYTHROCYTE [DISTWIDTH] IN BLOOD BY AUTOMATED COUNT: 13.5 %
FOLATE SERPL-MCNC: 10 NG/ML (ref 3.1–17.5)
GFR SERPL CREATININE-BSD FRML MDRD: 103 ML/MIN/1.73SQ M
GLUCOSE SERPL-MCNC: 88 MG/DL (ref 70–99)
HCT VFR BLD AUTO: 48.8 % (ref 36–46)
HGB BLD-MCNC: 16.5 G/DL (ref 12–16)
LYMPHOCYTES # BLD AUTO: 1.6 THOUSANDS/ΜL (ref 0.5–4)
LYMPHOCYTES NFR BLD AUTO: 24 % (ref 25–45)
MCH RBC QN AUTO: 36.9 PG (ref 26–34)
MCHC RBC AUTO-ENTMCNC: 33.9 G/DL (ref 31–36)
MCV RBC AUTO: 109 FL (ref 80–100)
MONOCYTES # BLD AUTO: 0.4 THOUSAND/ΜL (ref 0.2–0.9)
MONOCYTES NFR BLD AUTO: 6 % (ref 1–10)
NEUTROPHILS # BLD AUTO: 4.8 THOUSANDS/ΜL (ref 1.8–7.8)
NEUTS SEG NFR BLD AUTO: 70 % (ref 45–65)
PLATELET # BLD AUTO: 224 THOUSANDS/UL (ref 150–450)
PLATELET BLD QL SMEAR: ADEQUATE
PMV BLD AUTO: 7.8 FL (ref 8.9–12.7)
POTASSIUM SERPL-SCNC: 4.8 MMOL/L (ref 3.6–5)
PROT SERPL-MCNC: 7.5 G/DL (ref 5.9–8.4)
RBC # BLD AUTO: 4.48 MILLION/UL (ref 4–5.2)
RBC MORPH BLD: NORMAL
SODIUM SERPL-SCNC: 142 MMOL/L (ref 137–147)
TSH SERPL DL<=0.05 MIU/L-ACNC: 0.68 UIU/ML (ref 0.47–4.68)
VIT B12 SERPL-MCNC: 847 PG/ML (ref 100–900)
WBC # BLD AUTO: 6.9 THOUSAND/UL (ref 4.5–11)

## 2019-11-04 PROCEDURE — 84443 ASSAY THYROID STIM HORMONE: CPT

## 2019-11-04 PROCEDURE — 85025 COMPLETE CBC W/AUTO DIFF WBC: CPT

## 2019-11-04 PROCEDURE — 80201 ASSAY OF TOPIRAMATE: CPT

## 2019-11-04 PROCEDURE — 82607 VITAMIN B-12: CPT

## 2019-11-04 PROCEDURE — 36415 COLL VENOUS BLD VENIPUNCTURE: CPT

## 2019-11-04 PROCEDURE — 82746 ASSAY OF FOLIC ACID SERUM: CPT

## 2019-11-04 PROCEDURE — 82668 ASSAY OF ERYTHROPOIETIN: CPT

## 2019-11-04 PROCEDURE — 80053 COMPREHEN METABOLIC PANEL: CPT

## 2019-11-05 ENCOUNTER — TELEPHONE (OUTPATIENT)
Dept: NEUROLOGY | Facility: CLINIC | Age: 49
End: 2019-11-05

## 2019-11-05 LAB
EPO SERPL-ACNC: 10.9 MIU/ML (ref 2.6–18.5)
TOPIRAMATE SERPL-MCNC: 1.6 UG/ML (ref 2–25)

## 2019-11-05 NOTE — TELEPHONE ENCOUNTER
Patient states she is not missing any doses of her topiramate  She takes her other medications with it  Could this be causing her the issue with her labs coming back low

## 2019-11-05 NOTE — TELEPHONE ENCOUNTER
----- Message from Agustin Roth MD sent at 11/5/2019  2:35 PM EST -----  Please check if patient has missed any doses of topiramate as her level is low on this occasion  Thanks

## 2019-11-06 ENCOUNTER — OFFICE VISIT (OUTPATIENT)
Dept: HEMATOLOGY ONCOLOGY | Facility: CLINIC | Age: 49
End: 2019-11-06
Payer: COMMERCIAL

## 2019-11-06 ENCOUNTER — HOSPITAL ENCOUNTER (OUTPATIENT)
Dept: NON INVASIVE DIAGNOSTICS | Facility: HOSPITAL | Age: 49
Discharge: HOME | End: 2019-11-06
Attending: INTERNAL MEDICINE
Payer: COMMERCIAL

## 2019-11-06 VITALS
OXYGEN SATURATION: 99 % | RESPIRATION RATE: 14 BRPM | HEART RATE: 79 BPM | WEIGHT: 207 LBS | DIASTOLIC BLOOD PRESSURE: 92 MMHG | HEIGHT: 63 IN | TEMPERATURE: 96.4 F | BODY MASS INDEX: 36.68 KG/M2 | SYSTOLIC BLOOD PRESSURE: 130 MMHG

## 2019-11-06 DIAGNOSIS — Z83.2 FAMILY HISTORY OF HYPERCOAGULABILITY: Primary | ICD-10-CM

## 2019-11-06 DIAGNOSIS — D75.0 POLYCYTHEMIA, FAMILIAL: ICD-10-CM

## 2019-11-06 DIAGNOSIS — G43.009 MIGRAINE WITHOUT AURA AND WITHOUT STATUS MIGRAINOSUS, NOT INTRACTABLE: Primary | ICD-10-CM

## 2019-11-06 DIAGNOSIS — Z83.2 FAMILY HISTORY OF HYPERCOAGULABILITY: ICD-10-CM

## 2019-11-06 PROCEDURE — 93970 EXTREMITY STUDY: CPT

## 2019-11-06 PROCEDURE — 99214 OFFICE O/P EST MOD 30 MIN: CPT | Performed by: INTERNAL MEDICINE

## 2019-11-06 PROCEDURE — 93970 EXTREMITY STUDY: CPT | Performed by: SURGERY

## 2019-11-06 RX ORDER — OMEPRAZOLE 20 MG/1
40 CAPSULE, DELAYED RELEASE ORAL DAILY
COMMUNITY
End: 2021-10-29 | Stop reason: SDUPTHER

## 2019-11-06 RX ORDER — LINACLOTIDE 145 UG/1
CAPSULE, GELATIN COATED ORAL
Refills: 3 | COMMUNITY
Start: 2019-10-18 | End: 2021-02-24

## 2019-11-06 NOTE — TELEPHONE ENCOUNTER
Patient notified to have a repeat Topiramate level in 1 month   And that her taking other medication would not have nothing to do with the topiramate levels

## 2019-11-06 NOTE — PROGRESS NOTES
Nba Jacobs was seen in the vascular lab for evaluation for eligibility as TRUST research study participation  The patient was evaluated  prior to results from any imaging or testing known  Investigators were blind to all testing at time of evaluation  Screening criteria was reviewed and was has deemed eligible for study participation  Wells score of 1  for evaluation of DVT  All study information was explained to the participant and they agreed to participate  All paperwork needed for study was completed prior to drawing blood  Blood was drawn from fresh stick at the bedside while in the vascular lab, and provided to research coordinator    Provided a copy of signed consent

## 2019-11-06 NOTE — PROGRESS NOTES
Hematology/Oncology Outpatient Follow-up  Lola Stevens 52 y o  female 1970 [id number]    Date:  2019    Assessment and Plan:  1  Family history of hypercoagulability  The patient seems very concerned about the pain in the right calf area which will need to be further investigated with a Doppler ultrasound of the lower extremities to rule out deep vein thrombosis  This will be done today we will then discuss the results findings and act accordingly  - VAS lower limb venous duplex study, complete bilateral; Future    2  Polycythemia, familial  The patient continues to have elevated hemoglobin hematocrit  She also has macrocytic red cells without any hint of the hypothyroidism or vitamin B12 or folate deficiency  She had extensive workup for her polycythemia which is most likely reactive and related to the smoking she does on a daily basis  I did discuss with her extensively the benefit of smoking cessation  She seems to be encouraged in pursuing another attempt to quit smoking     - CBC and differential; Future  - Comprehensive metabolic panel; Future  - C-reactive protein; Future  - Erythropoietin; Future  - Sedimentation rate, automated; Future  - Vitamin B12; Future      HPI:  The patient came in today for a follow-up visit  Boo Quinn is a 51-year-old female with strong positive family history for clotting disorder   The patient has a history of seizure disorder, arthritis, migraine headaches, history of colon polyps, her last colonoscopy was in   Pauly Watkins family history is positive for polycythemia/erythrocytosis in her mother and maternal aunt  Pauly Watkins brother  at age 55 due to deep vein thrombosis/pulmonary embolism after a car trip  Pauly Watkins sister also had a diagnosis of DVT at age 52 after also a car trip    The patient seemed very concerned about her strong family history for hypercoagulability and asked to get extensive workup done for that particular reason   The hypercoagulable workup came back entirely normal with out any hint of prothrombin gene mutation , her factor 5 Leiden studie was normal   She also has a positive history for polycythemia most likely since childhood with positive family history for the same problem as stated above      The patient had extensive workup for her polycythemia including a negative JAK2 mutation study  She also had a bone marrow biopsy on the 20/6 of February 2019 which showed mildly hypercellular bone marrow with mild erythroid hyperplasia without significant dysplasia  The cytogenetic studies and flow cytometry came back all normal   The blast count was within normal range  Interval history:  The patient came today for a follow-up visit  She complained today about significant right calf area which seems to be intermittent and recurred about 2 weeks ago  There is also mild swelling in that region in comparison to the left extremity  She also complained about multiple other symptoms including neck pain, back pain and mild cough  Her most recent blood work from 11/04/2019 continues to show elevated hemoglobin of 16 5 and hematocrit of 48 8% with MCV of 109 her white cells and platelets were within normal range  The white cell differential is within normal range  The erythropoietin level was 10 9 her vitamin B12 folate and TSH were all within normal range  ROS: Review of Systems   Constitutional: Positive for fatigue  Negative for activity change, appetite change, chills, diaphoresis, fever and unexpected weight change  Hot flashes   HENT: Negative for congestion, dental problem, ear discharge, ear pain, facial swelling, hearing loss, mouth sores, nosebleeds, postnasal drip, sore throat, tinnitus and trouble swallowing  Eyes: Negative for discharge, redness, itching and visual disturbance  Respiratory: Positive for cough  Negative for chest tightness, shortness of breath and wheezing      Cardiovascular: Negative for chest pain, palpitations and leg swelling  Gastrointestinal: Positive for constipation  Negative for abdominal distention, abdominal pain, anal bleeding, blood in stool, diarrhea, nausea and vomiting  Genitourinary: Negative for difficulty urinating, dysuria, flank pain, frequency, hematuria and urgency  Musculoskeletal: Positive for arthralgias and gait problem (Due to pain in the right calf area)  Negative for back pain, joint swelling, myalgias and neck pain  Skin: Negative for color change, pallor, rash and wound  Neurological: Positive for numbness  Negative for dizziness, syncope, speech difficulty, weakness, light-headedness and headaches  Hematological: Negative for adenopathy  Does not bruise/bleed easily  Psychiatric/Behavioral: Negative for agitation, behavioral problems, confusion and sleep disturbance         Past Medical History:   Diagnosis Date    Migraines     Osteopenia     PVCs (premature ventricular contractions)     Seizure disorder [facility])        Past Surgical History:   Procedure Laterality Date    APPENDECTOMY      "1990's"    BONE MARROW BIOPSY W/ ASPIRATION  03/2019    COLONOSCOPY  2011    HYSTERECTOMY      IR BONE MARROW BIOPSY/ASPIRATION  2/26/2019    NECK SURGERY Right     resection of a mass from the posterior neck     SHOULDER SURGERY Right     2 surgeries       Social History     Socioeconomic History    Marital status: /Civil Union     Spouse name: None    Number of children: None    Years of education: None    Highest education level: None   Occupational History    None   Social Needs    Financial resource strain: None    Food insecurity:     Worry: None     Inability: None    Transportation needs:     Medical: None     Non-medical: None   Tobacco Use    Smoking status: Current Every Day Smoker     Packs/day: 0 50    Smokeless tobacco: Never Used   Substance and Sexual Activity    Alcohol use: No    Drug use: No    Sexual activity: Yes   Lifestyle    Physical activity: Days per week: None     Minutes per session: None    Stress: None   Relationships    Social connections:     Talks on phone: None     Gets together: None     Attends [employer] service: None     Active member of club or organization: None     Attends meetings of clubs or organizations: None     Relationship status: None    Intimate partner violence:     Fear of current or ex partner: None     Emotionally abused: None     Physically abused: None     Forced sexual activity: None   Other Topics Concern    None   Social History Narrative    None       Family History   Problem Relation Age of Onset    Hypertension Family     Migraines Family     Hypertension Father     Clotting disorder Brother     Ovarian cancer Maternal Grandmother 72    Breast cancer Paternal Aunt     Breast cancer Cousin 46    Breast cancer Cousin 39    Breast cancer Cousin 46       Allergies   Allergen Reactions    Amitriptyline Other (See Comments)     confusion    Propranolol Other (See Comments)     Other reaction(s):  Other (See Comments)  Distension abdominal         Current Outpatient Medications:     aspirin (ECOTRIN LOW STRENGTH) 81 mg EC tablet, Take 81 mg by mouth, Disp: , Rfl:     butalbital-acetaminophen-caffeine (FIORICET,ESGIC) -40 mg per tablet, BY ORAL ROUTE TAKE 1 TABLET EVERY 4-6 HOURS AS NEEDED FOR HEADACHE  LIMIT 4 TABLETS PER 24 HR , Disp: 30 tablet, Rfl: 0    Calcium Carbonate-Vitamin D3 600-400 MG-UNIT TABS, Take 1 tablet by mouth 2 (two) times a day , Disp: , Rfl:     gabapentin (NEURONTIN) 300 mg capsule, Take 1 capsule (300 mg total) by mouth 3 (three) times a day, Disp: 270 capsule, Rfl: 1    HYDROcodone Bitartrate ER (ZOHYDRO ER) 30 MG CP12, Take by mouth 2 (two) times a day , Disp: , Rfl:     HYDROcodone-acetaminophen (NORCO) 5-325 mg per tablet, TAKE 1 TABLET 2-3 TIMES DAILY AS NEEDED, Disp: , Rfl: 0    LINZESS 145 MCG CAPS, TAKE 1 CAPSULE BEFORE BREAKFAST, Disp: , Rfl: 3    omeprazole (PRILOSEC) 20 mg delayed release capsule, Take 20 mg by mouth daily, Disp: , Rfl:     topiramate (TOPAMAX) 200 MG tablet, Take 1 tablet (200 mg total) by mouth 2 (two) times a day, Disp: 180 tablet, Rfl: 1    LORazepam (ATIVAN) 0 5 mg tablet, 0 5 mg 2 (two) times a day as needed, Disp: , Rfl: 1      Physical Exam:  /92 (BP Location: Left arm, Patient Position: Sitting, Cuff Size: Adult)   Pulse 79   Temp (!) 96 4 °F (35 8 °C) (Tympanic)   Resp 14   Ht 5' 3" (1 6 m)   Wt 93 9 kg (207 lb)   SpO2 99%   BMI 36 67 kg/m²     Physical Exam   Constitutional: She is oriented to person, place, and time  She appears well-developed and well-nourished  No distress  HENT:   Head: Normocephalic and atraumatic  Nose: Nose normal    Mouth/Throat: Oropharynx is clear and moist    Eyes: Pupils are equal, round, and reactive to light  Conjunctivae and EOM are normal  Right eye exhibits no discharge  Left eye exhibits no discharge  No scleral icterus  Neck: Normal range of motion  Neck supple  No JVD present  No tracheal deviation present  No thyromegaly present  Cardiovascular: Normal rate, regular rhythm and normal heart sounds  Exam reveals no friction rub  No murmur heard  Pulmonary/Chest: Effort normal and breath sounds normal  No stridor  No respiratory distress  She has no wheezes  She has no rales  She exhibits no tenderness  Abdominal: Soft  Bowel sounds are normal  She exhibits no distension and no mass  There is no hepatosplenomegaly, splenomegaly or hepatomegaly  There is no tenderness  There is no rebound and no guarding  Musculoskeletal: Normal range of motion  She exhibits no edema, tenderness or deformity  Pain in the right calf area   Lymphadenopathy:     She has no cervical adenopathy  Neurological: She is alert and oriented to person, place, and time  She has normal reflexes  No cranial nerve deficit  Coordination normal    Skin: Skin is warm and dry  No rash noted   She is not diaphoretic  No erythema  No pallor  Psychiatric: She has a normal mood and affect  Her behavior is normal  Judgment and thought content normal          Labs:  Lab Results   Component Value Date    WBC 6 90 11/04/2019    HGB 16 5 (H) 11/04/2019    HCT 48 8 (H) 11/04/2019     (H) 11/04/2019     11/04/2019     Lab Results   Component Value Date    K 4 8 11/04/2019     (H) 11/04/2019    CO2 22 11/04/2019    BUN 16 11/04/2019    CREATININE 0 78 11/04/2019    CALCIUM 9 8 11/04/2019    AST 24 11/04/2019    ALT 27 11/04/2019    ALKPHOS 65 11/04/2019    EGFR 103 11/04/2019       Patient voiced understanding and agreement in the above discussion  Aware to contact our office with questions/symptoms in the interim

## 2019-11-07 ENCOUNTER — TELEPHONE (OUTPATIENT)
Dept: HEMATOLOGY ONCOLOGY | Facility: CLINIC | Age: 49
End: 2019-11-07

## 2019-11-07 NOTE — TELEPHONE ENCOUNTER
Patient called back again for her results  I gave her the results and told her that she was negative for DVT in both legs

## 2019-11-07 NOTE — TELEPHONE ENCOUNTER
Pt called for results of the stat venous duplex she had done yesterday 11/06/19  Pt can be reached at [phone]

## 2019-11-22 DIAGNOSIS — G43.009 MIGRAINE WITHOUT AURA AND WITHOUT STATUS MIGRAINOSUS, NOT INTRACTABLE: ICD-10-CM

## 2019-11-22 RX ORDER — BUTALBITAL, ACETAMINOPHEN AND CAFFEINE 50; 325; 40 MG/1; MG/1; MG/1
TABLET ORAL
Qty: 30 TABLET | Refills: 0 | Status: SHIPPED | OUTPATIENT
Start: 2019-11-22 | End: 2020-01-06 | Stop reason: SDUPTHER

## 2019-12-04 ENCOUNTER — TELEPHONE (OUTPATIENT)
Dept: NEUROLOGY | Facility: CLINIC | Age: 49
End: 2019-12-04

## 2019-12-04 NOTE — TELEPHONE ENCOUNTER
Patient was just given her refill for her Fioricet  No additional refills were included  She can call when she needs another refill  Like to keep track of how much patient actually uses  Thanks  Her lab requests are indeed in the computer

## 2019-12-04 NOTE — TELEPHONE ENCOUNTER
Patient questioning why the topiramate lab order was not faxed to [facility] lab  Informed patient that the lab should be able to see her orders in the chart  Advised her to have the lab call us if she would go there and they had any issues with seeing lab orders  She is also questioning why Dr Koko London did not include refills on her most recent fioricet  She stated Dr Koko London always gave her at least one or two refills  She does not need a refill yet but would like the rx be sent to include refills since she is not being seen until March

## 2019-12-04 NOTE — TELEPHONE ENCOUNTER
Spoke with the patient and informed her about her Fioricet refill and her lab slips per Dr Agudelo Other

## 2019-12-10 ENCOUNTER — TELEPHONE (OUTPATIENT)
Dept: SURGERY | Facility: HOSPITAL | Age: 49
End: 2019-12-10

## 2019-12-11 ENCOUNTER — HOSPITAL ENCOUNTER (OUTPATIENT)
Dept: INTERVENTIONAL RADIOLOGY/VASCULAR | Facility: HOSPITAL | Age: 49
Discharge: HOME | End: 2019-12-11
Admitting: PHYSICAL MEDICINE & REHABILITATION
Payer: COMMERCIAL

## 2019-12-11 VITALS
SYSTOLIC BLOOD PRESSURE: 125 MMHG | OXYGEN SATURATION: 96 % | TEMPERATURE: 97 F | DIASTOLIC BLOOD PRESSURE: 86 MMHG | RESPIRATION RATE: 20 BRPM | HEART RATE: 83 BPM

## 2019-12-11 DIAGNOSIS — M47.892 OTHER SPONDYLOSIS, CERVICAL REGION: ICD-10-CM

## 2019-12-11 PROCEDURE — 99152 MOD SED SAME PHYS/QHP 5/>YRS: CPT

## 2019-12-11 RX ORDER — LIDOCAINE WITH 8.4% SOD BICARB 0.9%(10ML)
SYRINGE (ML) INJECTION CODE/TRAUMA/SEDATION MEDICATION
Status: COMPLETED | OUTPATIENT
Start: 2019-12-11 | End: 2019-12-11

## 2019-12-11 RX ORDER — MIDAZOLAM HYDROCHLORIDE 2 MG/2ML
INJECTION, SOLUTION INTRAMUSCULAR; INTRAVENOUS CODE/TRAUMA/SEDATION MEDICATION
Status: COMPLETED | OUTPATIENT
Start: 2019-12-11 | End: 2019-12-11

## 2019-12-11 RX ORDER — SODIUM CHLORIDE 9 MG/ML
50 INJECTION, SOLUTION INTRAVENOUS CONTINUOUS
Status: DISCONTINUED | OUTPATIENT
Start: 2019-12-11 | End: 2019-12-15 | Stop reason: HOSPADM

## 2019-12-11 RX ORDER — FENTANYL CITRATE 50 UG/ML
INJECTION, SOLUTION INTRAMUSCULAR; INTRAVENOUS CODE/TRAUMA/SEDATION MEDICATION
Status: COMPLETED | OUTPATIENT
Start: 2019-12-11 | End: 2019-12-11

## 2019-12-11 RX ORDER — SODIUM CHLORIDE 9 MG/ML
50 INJECTION, SOLUTION INTRAVENOUS CONTINUOUS
Status: CANCELLED | OUTPATIENT
Start: 2019-12-11

## 2019-12-11 RX ORDER — KETOROLAC TROMETHAMINE 30 MG/ML
INJECTION, SOLUTION INTRAMUSCULAR; INTRAVENOUS CODE/TRAUMA/SEDATION MEDICATION
Status: COMPLETED | OUTPATIENT
Start: 2019-12-11 | End: 2019-12-11

## 2019-12-11 RX ADMIN — MIDAZOLAM HYDROCHLORIDE 1 MG: 1 INJECTION, SOLUTION INTRAMUSCULAR; INTRAVENOUS at 12:26

## 2019-12-11 RX ADMIN — LIDOCAINE HYDROCHLORIDE 10 ML: 10 INJECTION, SOLUTION INFILTRATION; PERINEURAL at 12:32

## 2019-12-11 RX ADMIN — SODIUM CHLORIDE 50 ML/HR: 0.9 INJECTION, SOLUTION INTRAVENOUS at 11:24

## 2019-12-11 RX ADMIN — FENTANYL CITRATE 25 MCG: 50 INJECTION INTRAMUSCULAR; INTRAVENOUS at 12:26

## 2019-12-11 RX ADMIN — KETOROLAC TROMETHAMINE 30 MG: 30 INJECTION, SOLUTION INTRAMUSCULAR; INTRAVENOUS at 12:18

## 2019-12-11 RX ADMIN — MIDAZOLAM HYDROCHLORIDE 2 MG: 1 INJECTION, SOLUTION INTRAMUSCULAR; INTRAVENOUS at 12:18

## 2019-12-11 RX ADMIN — FENTANYL CITRATE 50 MCG: 50 INJECTION INTRAMUSCULAR; INTRAVENOUS at 12:18

## 2020-01-06 DIAGNOSIS — G43.009 MIGRAINE WITHOUT AURA AND WITHOUT STATUS MIGRAINOSUS, NOT INTRACTABLE: ICD-10-CM

## 2020-01-06 RX ORDER — BUTALBITAL, ACETAMINOPHEN AND CAFFEINE 50; 325; 40 MG/1; MG/1; MG/1
TABLET ORAL
Qty: 30 TABLET | Refills: 0 | Status: SHIPPED | OUTPATIENT
Start: 2020-01-06 | End: 2020-02-04 | Stop reason: SDUPTHER

## 2020-01-06 NOTE — TELEPHONE ENCOUNTER
Patient calling for a refill of Fioricet  Please sign if agreeable  Requesting a call once script is sent      [phone] Hypertension, unspecified type

## 2020-01-06 NOTE — TELEPHONE ENCOUNTER
Patient not at home   answered  He will let the patient know that the script has been sent to the pharmacy

## 2020-02-03 ENCOUNTER — TELEPHONE (OUTPATIENT)
Dept: OTHER | Facility: HOSPITAL | Age: 50
End: 2020-02-03

## 2020-02-04 DIAGNOSIS — G43.009 MIGRAINE WITHOUT AURA AND WITHOUT STATUS MIGRAINOSUS, NOT INTRACTABLE: ICD-10-CM

## 2020-02-04 NOTE — TELEPHONE ENCOUNTER
Pt called requesting fioricet refill  [facility] entered   If agreeable, pls sign off      thanks

## 2020-02-04 NOTE — TELEPHONE ENCOUNTER
Ashley, please check with patient  She just received a script for 30 Fioricet with no refills on January the 6  If she needs a refill already she is using too much

## 2020-02-05 RX ORDER — BUTALBITAL, ACETAMINOPHEN AND CAFFEINE 50; 325; 40 MG/1; MG/1; MG/1
TABLET ORAL
Qty: 15 TABLET | Refills: 0 | Status: SHIPPED | OUTPATIENT
Start: 2020-02-05 | End: 2020-03-26 | Stop reason: SDUPTHER

## 2020-02-05 NOTE — TELEPHONE ENCOUNTER
Patient states she is under a lot of stress right now handling the  arrangements for her mother in law which is triggering her migraine's  This is why she is taking a lot more of her Fioricet  After speaking with Dr Marce Gross  He will prescribe 15 Fioricet which must last her 30 days  Patient made aware again that over use of her Fioricet will trigger her headaches       She was informed to contact her PCP to see if there is something that they can do to help her stress that she is having at this time

## 2020-03-12 ENCOUNTER — OFFICE VISIT (OUTPATIENT)
Dept: NEUROLOGY | Facility: CLINIC | Age: 50
End: 2020-03-12
Payer: COMMERCIAL

## 2020-03-12 VITALS
DIASTOLIC BLOOD PRESSURE: 78 MMHG | SYSTOLIC BLOOD PRESSURE: 114 MMHG | BODY MASS INDEX: 37.63 KG/M2 | HEART RATE: 76 BPM | WEIGHT: 212.4 LBS | HEIGHT: 63 IN | RESPIRATION RATE: 18 BRPM

## 2020-03-12 DIAGNOSIS — G47.9 SLEEP DISTURBANCE: ICD-10-CM

## 2020-03-12 DIAGNOSIS — G43.009 MIGRAINE WITHOUT AURA AND WITHOUT STATUS MIGRAINOSUS, NOT INTRACTABLE: Primary | ICD-10-CM

## 2020-03-12 DIAGNOSIS — G40.909 SEIZURE DISORDER (HCC): ICD-10-CM

## 2020-03-12 PROCEDURE — 99214 OFFICE O/P EST MOD 30 MIN: CPT | Performed by: PSYCHIATRY & NEUROLOGY

## 2020-03-12 RX ORDER — GABAPENTIN 300 MG/1
CAPSULE ORAL
Qty: 360 CAPSULE | Refills: 1 | Status: SHIPPED | OUTPATIENT
Start: 2020-03-12 | End: 2020-08-18 | Stop reason: SDUPTHER

## 2020-03-12 NOTE — ASSESSMENT & PLAN NOTE
Continues to do well with no seizure or seizure-like activity reported  Last reported seizure occurrence August 30, 2010  Most recent EEG normal awake, drowsing and sleep study  --continue topiramate 200 mg twice daily

## 2020-03-12 NOTE — LETTER
March 12, 2020     Kaylyn Whitney MD  [facility]  [address]    Patient: Roxi Ward   YOB: 1970   Date of Visit: 3/12/2020       Dear Dr Kwan Antonio:    Thank you for referring Roxi Ward to me for evaluation  Below are my notes for this consultation  If you have questions, please do not hesitate to call me  I look forward to following your patient along with you  Sincerely,        Rachel Miguel MD        CC: No Recipients  Rachel Miguel MD  3/12/2020  1:42 PM  Sign at close encounter  Patient ID: Roxi Ward is a 52 y o  female  Assessment/Plan:    Migraine without aura and without status migrainosus, not intractable  Unfortunately, has had a slight increase in headache frequency, in the recent past experiencing 1-2 episodes on a weekly basis  Likely to some extent related to the stresses placed on her with regard to the passing of her father in law  However, suspect that she may well have at associated sleep problem, with headache certainly exaggerated in the presence of inadequate restorative sleep  --advanced gabapentin to 300 mg capsules taking 1 capsule twice daily and 2 capsules at bedtime daily  --continue topiramate 200 mg twice daily  --to maintain a headache diary  Seizure disorder ([facility] [address] )  Continues to do well with no seizure or seizure-like activity reported  Last reported seizure occurrence August 30, 2010  Most recent EEG normal awake, drowsing and sleep study  --continue topiramate 200 mg twice daily  Sleep disturbance  Describing a combination of insomnia, multiple awakenings, significant movements in sleep, and verbalizations in sleep  Suspect that she may well not be achieving appropriate amounts of restorative sleep and if so, likely impacting negatively on her migraine circumstance  --ambulatory referral to sleep medicine  She will follow up in 8-10 weeks      Subjective:    HPI  Patient, 52years of age, continues her ongoing care with Neurology given her migraine and seizure diagnoses  Her migraine (without aura) has been occurring slightly more so in the recent past   She is relating 1-2 episodes on a weekly basis  She states that this began in about that time where her father in law passed away and she and her  were responsible for all the issues involved in his burial   She has not been overusing any analgesics for symptomatic headache relief  For headache control she has remained on topiramate 200 mg twice daily along with gabapentin 300 mg 3 times daily, with no reported adverse side effects  There is also history of seizures  Fortunately, she has done well from that standpoint, with the last recorded event on August 30, 2010  We reviewed the results of her last EEG which was performed in December of 2008 and that study was a normal awake, drowsing and sleep study  She also presented today with an additional problem  Apparently, in the recent past she has had increasing issues with sleep  She describes early night insomnia as well as multiple awakenings  She states that she awakens unrefreshed and is tired throughout the day  Her  commented that in sleep she moves about frequently and also has verbalizations throughout the night  Results of November blood work reviewed:  -CBC with hemoglobin 16 5 hematocrit 48 8, white count 6 90 and platelet count 021  -CMP with creatinine 0 78, AST 24, ALT 27, chloride 112 and CO2 22   -topiramate level 1 6   -TSH normal 0 683   -B12 normal 847   -folate normal 10 0        Past Medical History:   Diagnosis Date    Diverticulosis     Hiatal hernia     Migraine     Migraines     Osteopenia     Peptic ulcer     Polycythemia     PVCs (premature ventricular contractions)     Seizure disorder [facility])      Past Surgical History:   Procedure Laterality Date    APPENDECTOMY      "1990's"    BONE MARROW BIOPSY W/ ASPIRATION  03/2019  COLONOSCOPY  2011    HYSTERECTOMY      IR BONE MARROW BIOPSY/ASPIRATION  2/26/2019    NECK SURGERY Right     resection of a mass from the posterior neck     SHOULDER SURGERY Right     2 surgeries     Social History     Socioeconomic History    Marital status: /Civil Union     Spouse name: None    Number of children: None    Years of education: None    Highest education level: None   Occupational History    None   Social Needs    Financial resource strain: None    Food insecurity:     Worry: None     Inability: None    Transportation needs:     Medical: None     Non-medical: None   Tobacco Use    Smoking status: Current Every Day Smoker     Packs/day: 0 50    Smokeless tobacco: Never Used   Substance and Sexual Activity    Alcohol use: No    Drug use: No    Sexual activity: Yes   Lifestyle    Physical activity:     Days per week: None     Minutes per session: None    Stress: None   Relationships    Social connections:     Talks on phone: None     Gets together: None     Attends Yazdanism service: None     Active member of club or organization: None     Attends meetings of clubs or organizations: None     Relationship status: None    Intimate partner violence:     Fear of current or ex partner: None     Emotionally abused: None     Physically abused: None     Forced sexual activity: None   Other Topics Concern    None   Social History Narrative    None     Family History   Problem Relation Age of Onset    Hypertension Family     Migraines Family     Hypertension Father     Clotting disorder Brother     Ovarian cancer Maternal Grandmother 72    Breast cancer Paternal Aunt     Breast cancer Cousin 46    Breast cancer Cousin 39    Breast cancer Cousin 46     Allergies   Allergen Reactions    Amitriptyline Other (See Comments)     confusion    Propranolol Other (See Comments)     Other reaction(s):  Other (See Comments)  Distension abdominal       Current Outpatient Medications:   aspirin (ECOTRIN LOW STRENGTH) 81 mg EC tablet, Take 81 mg by mouth, Disp: , Rfl:     butalbital-acetaminophen-caffeine (FIORICET,ESGIC) -40 mg per tablet, By mouth take 1 tablet for migraine  May repeat in 4 hours if needed  Limit 2 tablets per 24 hours  , Disp: 15 tablet, Rfl: 0    Calcium Carbonate-Vitamin D3 600-400 MG-UNIT TABS, Take 1 tablet by mouth 2 (two) times a day , Disp: , Rfl:     gabapentin (NEURONTIN) 300 mg capsule, By mouth take 1 capsule twice daily and 2 capsules at bedtime daily, Disp: 360 capsule, Rfl: 1    HYDROcodone Bitartrate ER (ZOHYDRO ER) 30 MG CP12, Take by mouth 2 (two) times a day , Disp: , Rfl:     HYDROcodone-acetaminophen (NORCO) 5-325 mg per tablet, TAKE 1 TABLET 2-3 TIMES DAILY AS NEEDED, Disp: , Rfl: 0    LINZESS 145 MCG CAPS, TAKE 1 CAPSULE BEFORE BREAKFAST, Disp: , Rfl: 3    omeprazole (PRILOSEC) 20 mg delayed release capsule, Take 40 mg by mouth daily , Disp: , Rfl:     topiramate (TOPAMAX) 200 MG tablet, Take 1 tablet (200 mg total) by mouth 2 (two) times a day, Disp: 180 tablet, Rfl: 1    LORazepam (ATIVAN) 0 5 mg tablet, 0 5 mg 2 (two) times a day as needed, Disp: , Rfl: 1    Objective:    Blood pressure 114/78, pulse 76, resp  rate 18, height 5' 3" (1 6 m), weight 96 3 kg (212 lb 6 4 oz)  Physical Exam  Head normocephalic  Eyes nonicteric  No audible anterior neck bruits  Lungs clear to auscultation  Rhythm regular  GI (abdomen) soft nontender  Bowel sounds present  No significant lower extremity edema  Neurological Exam  Alert  Pleasantly and appropriately conversational   No dysarthria  Fully oriented  Spontaneous gait unremarkable  Cranial nerves 2-12 tested and grossly intact  Funduscopic examination with marginated discs bilaterally  Accurate finger-to-nose bilaterally  Full symmetrical strength throughout the 4 extremities  No upper extremity drift    Muscle stretch reflexes bilaterally 1 throughout the upper extremities, at the knees and at the ankles  ROS:    Review of Systems   Constitutional: Negative  Negative for appetite change and fever  HENT: Negative  Negative for hearing loss, tinnitus, trouble swallowing and voice change  Eyes: Negative  Negative for photophobia and pain  Respiratory: Negative  Negative for shortness of breath  Cardiovascular: Negative  Negative for palpitations  Gastrointestinal: Positive for constipation  Negative for nausea and vomiting  Endocrine: Negative  Negative for cold intolerance and heat intolerance  Genitourinary: Negative  Negative for dysuria, frequency and urgency  Musculoskeletal: Positive for neck pain and neck stiffness  Negative for myalgias  Skin: Negative  Negative for rash  Allergic/Immunologic: Negative  Neurological: Positive for numbness (numbness and tingling in the neck)  Negative for dizziness, tremors, seizures, syncope, facial asymmetry, speech difficulty, weakness, light-headedness and headaches  Hematological: Negative  Does not bruise/bleed easily  Psychiatric/Behavioral: Positive for sleep disturbance  Negative for confusion and hallucinations  I personally reviewed the ROS as entered by the medical assistant  *Please note this document was created using voice recognition software and may contain sound-alike word errors  *

## 2020-03-12 NOTE — PATIENT INSTRUCTIONS
Increase gabapentin using 300 mg capsules to 1 capsule twice daily and 2 capsules at bedtime daily  Continue topiramate unchanged at 200 mg tablets 1 twice daily  Please keep a headache diary

## 2020-03-12 NOTE — ASSESSMENT & PLAN NOTE
Unfortunately, has had a slight increase in headache frequency, in the recent past experiencing 1-2 episodes on a weekly basis  Likely to some extent related to the stresses placed on her with regard to the passing of her father in law  However, suspect that she may well have at associated sleep problem, with headache certainly exaggerated in the presence of inadequate restorative sleep  --advanced gabapentin to 300 mg capsules taking 1 capsule twice daily and 2 capsules at bedtime daily  --continue topiramate 200 mg twice daily  --to maintain a headache diary

## 2020-03-12 NOTE — PROGRESS NOTES
Patient ID: Donna Schuster is a 52 y o  female  Assessment/Plan:    Migraine without aura and without status migrainosus, not intractable  Unfortunately, has had a slight increase in headache frequency, in the recent past experiencing 1-2 episodes on a weekly basis  Likely to some extent related to the stresses placed on her with regard to the passing of her father in law  However, suspect that she may well have at associated sleep problem, with headache certainly exaggerated in the presence of inadequate restorative sleep  --advanced gabapentin to 300 mg capsules taking 1 capsule twice daily and 2 capsules at bedtime daily  --continue topiramate 200 mg twice daily  --to maintain a headache diary  Seizure disorder ([facility] [address] )  Continues to do well with no seizure or seizure-like activity reported  Last reported seizure occurrence August 30, 2010  Most recent EEG normal awake, drowsing and sleep study  --continue topiramate 200 mg twice daily  Sleep disturbance  Describing a combination of insomnia, multiple awakenings, significant movements in sleep, and verbalizations in sleep  Suspect that she may well not be achieving appropriate amounts of restorative sleep and if so, likely impacting negatively on her migraine circumstance  --ambulatory referral to sleep medicine  She will follow up in 8-10 weeks  Subjective:    HPI  Patient, 52years of age, continues her ongoing care with Neurology given her migraine and seizure diagnoses  Her migraine (without aura) has been occurring slightly more so in the recent past   She is relating 1-2 episodes on a weekly basis  She states that this began in about that time where her father in law passed away and she and her  were responsible for all the issues involved in his burial   She has not been overusing any analgesics for symptomatic headache relief    For headache control she has remained on topiramate 200 mg twice daily along with gabapentin 300 mg 3 times daily, with no reported adverse side effects  There is also history of seizures  Fortunately, she has done well from that standpoint, with the last recorded event on August 30, 2010  We reviewed the results of her last EEG which was performed in December of 2008 and that study was a normal awake, drowsing and sleep study  She also presented today with an additional problem  Apparently, in the recent past she has had increasing issues with sleep  She describes early night insomnia as well as multiple awakenings  She states that she awakens unrefreshed and is tired throughout the day  Her  commented that in sleep she moves about frequently and also has verbalizations throughout the night  Results of November blood work reviewed:  -CBC with hemoglobin 16 5 hematocrit 48 8, white count 6 90 and platelet count 152  -CMP with creatinine 0 78, AST 24, ALT 27, chloride 112 and CO2 22   -topiramate level 1 6   -TSH normal 0 683   -B12 normal 847   -folate normal 10 0        Past Medical History:   Diagnosis Date    Diverticulosis     Hiatal hernia     Migraine     Migraines     Osteopenia     Peptic ulcer     Polycythemia     PVCs (premature ventricular contractions)     Seizure disorder [facility])      Past Surgical History:   Procedure Laterality Date    APPENDECTOMY      "1990's"    BONE MARROW BIOPSY W/ ASPIRATION  03/2019    COLONOSCOPY  2011    HYSTERECTOMY      IR BONE MARROW BIOPSY/ASPIRATION  2/26/2019    NECK SURGERY Right     resection of a mass from the posterior neck     SHOULDER SURGERY Right     2 surgeries     Social History     Socioeconomic History    Marital status: /Civil Union     Spouse name: None    Number of children: None    Years of education: None    Highest education level: None   Occupational History    None   Social Needs    Financial resource strain: None    Food insecurity:     Worry: None     Inability: None    Transportation needs: Medical: None     Non-medical: None   Tobacco Use    Smoking status: Current Every Day Smoker     Packs/day: 0 50    Smokeless tobacco: Never Used   Substance and Sexual Activity    Alcohol use: No    Drug use: No    Sexual activity: Yes   Lifestyle    Physical activity:     Days per week: None     Minutes per session: None    Stress: None   Relationships    Social connections:     Talks on phone: None     Gets together: None     Attends [employer] service: None     Active member of club or organization: None     Attends meetings of clubs or organizations: None     Relationship status: None    Intimate partner violence:     Fear of current or ex partner: None     Emotionally abused: None     Physically abused: None     Forced sexual activity: None   Other Topics Concern    None   Social History Narrative    None     Family History   Problem Relation Age of Onset    Hypertension Family     Migraines Family     Hypertension Father     Clotting disorder Brother     Ovarian cancer Maternal Grandmother 72    Breast cancer Paternal Aunt     Breast cancer Cousin 46    Breast cancer Cousin 39    Breast cancer Cousin 46     Allergies   Allergen Reactions    Amitriptyline Other (See Comments)     confusion    Propranolol Other (See Comments)     Other reaction(s): Other (See Comments)  Distension abdominal       Current Outpatient Medications:     aspirin (ECOTRIN LOW STRENGTH) 81 mg EC tablet, Take 81 mg by mouth, Disp: , Rfl:     butalbital-acetaminophen-caffeine (FIORICET,ESGIC) -40 mg per tablet, By mouth take 1 tablet for migraine  May repeat in 4 hours if needed  Limit 2 tablets per 24 hours  , Disp: 15 tablet, Rfl: 0    Calcium Carbonate-Vitamin D3 600-400 MG-UNIT TABS, Take 1 tablet by mouth 2 (two) times a day , Disp: , Rfl:     gabapentin (NEURONTIN) 300 mg capsule, By mouth take 1 capsule twice daily and 2 capsules at bedtime daily, Disp: 360 capsule, Rfl: 1    HYDROcodone Bitartrate ER (ZOHYDRO ER) 30 MG CP12, Take by mouth 2 (two) times a day , Disp: , Rfl:     HYDROcodone-acetaminophen (NORCO) 5-325 mg per tablet, TAKE 1 TABLET 2-3 TIMES DAILY AS NEEDED, Disp: , Rfl: 0    LINZESS 145 MCG CAPS, TAKE 1 CAPSULE BEFORE BREAKFAST, Disp: , Rfl: 3    omeprazole (PRILOSEC) 20 mg delayed release capsule, Take 40 mg by mouth daily , Disp: , Rfl:     topiramate (TOPAMAX) 200 MG tablet, Take 1 tablet (200 mg total) by mouth 2 (two) times a day, Disp: 180 tablet, Rfl: 1    LORazepam (ATIVAN) 0 5 mg tablet, 0 5 mg 2 (two) times a day as needed, Disp: , Rfl: 1    Objective:    Blood pressure 114/78, pulse 76, resp  rate 18, height 5' 3" (1 6 m), weight 96 3 kg (212 lb 6 4 oz)  Physical Exam  Head normocephalic  Eyes nonicteric  No audible anterior neck bruits  Lungs clear to auscultation  Rhythm regular  GI (abdomen) soft nontender  Bowel sounds present  No significant lower extremity edema  Neurological Exam  Alert  Pleasantly and appropriately conversational   No dysarthria  Fully oriented  Spontaneous gait unremarkable  Cranial nerves 2-12 tested and grossly intact  Funduscopic examination with marginated discs bilaterally  Accurate finger-to-nose bilaterally  Full symmetrical strength throughout the 4 extremities  No upper extremity drift  Muscle stretch reflexes bilaterally 1 throughout the upper extremities, at the knees and at the ankles  ROS:    Review of Systems   Constitutional: Negative  Negative for appetite change and fever  HENT: Negative  Negative for hearing loss, tinnitus, trouble swallowing and voice change  Eyes: Negative  Negative for photophobia and pain  Respiratory: Negative  Negative for shortness of breath  Cardiovascular: Negative  Negative for palpitations  Gastrointestinal: Positive for constipation  Negative for nausea and vomiting  Endocrine: Negative  Negative for cold intolerance and heat intolerance  Genitourinary: Negative  Negative for dysuria, frequency and urgency  Musculoskeletal: Positive for neck pain and neck stiffness  Negative for myalgias  Skin: Negative  Negative for rash  Allergic/Immunologic: Negative  Neurological: Positive for numbness (numbness and tingling in the neck)  Negative for dizziness, tremors, seizures, syncope, facial asymmetry, speech difficulty, weakness, light-headedness and headaches  Hematological: Negative  Does not bruise/bleed easily  Psychiatric/Behavioral: Positive for sleep disturbance  Negative for confusion and hallucinations  I personally reviewed the ROS as entered by the medical assistant  *Please note this document was created using voice recognition software and may contain sound-alike word errors  *

## 2020-03-12 NOTE — ASSESSMENT & PLAN NOTE
Describing a combination of insomnia, multiple awakenings, significant movements in sleep, and verbalizations in sleep  Suspect that she may well not be achieving appropriate amounts of restorative sleep and if so, likely impacting negatively on her migraine circumstance  --ambulatory referral to sleep medicine

## 2020-03-13 ENCOUNTER — TELEPHONE (OUTPATIENT)
Dept: HEMATOLOGY ONCOLOGY | Facility: CLINIC | Age: 50
End: 2020-03-13

## 2020-03-16 ENCOUNTER — APPOINTMENT (OUTPATIENT)
Dept: LAB | Facility: HOSPITAL | Age: 50
End: 2020-03-16
Payer: COMMERCIAL

## 2020-03-16 DIAGNOSIS — D75.0 POLYCYTHEMIA, FAMILIAL: ICD-10-CM

## 2020-03-16 DIAGNOSIS — G43.009 MIGRAINE WITHOUT AURA AND WITHOUT STATUS MIGRAINOSUS, NOT INTRACTABLE: ICD-10-CM

## 2020-03-16 LAB
ALBUMIN SERPL BCP-MCNC: 4.2 G/DL (ref 3–5.2)
ALP SERPL-CCNC: 73 U/L (ref 43–122)
ALT SERPL W P-5'-P-CCNC: 34 U/L (ref 9–52)
ANION GAP SERPL CALCULATED.3IONS-SCNC: 8 MMOL/L (ref 5–14)
AST SERPL W P-5'-P-CCNC: 32 U/L (ref 14–36)
BASOPHILS # BLD AUTO: 0 THOUSANDS/ΜL (ref 0–0.1)
BASOPHILS NFR BLD AUTO: 1 % (ref 0–1)
BILIRUB SERPL-MCNC: 0.3 MG/DL
BUN SERPL-MCNC: 11 MG/DL (ref 5–25)
CALCIUM SERPL-MCNC: 9.4 MG/DL (ref 8.4–10.2)
CHLORIDE SERPL-SCNC: 107 MMOL/L (ref 97–108)
CO2 SERPL-SCNC: 26 MMOL/L (ref 22–30)
CREAT SERPL-MCNC: 0.82 MG/DL (ref 0.6–1.2)
CRP SERPL QL: 5.9 MG/L
EOSINOPHIL # BLD AUTO: 0.1 THOUSAND/ΜL (ref 0–0.4)
EOSINOPHIL NFR BLD AUTO: 2 % (ref 0–6)
ERYTHROCYTE [DISTWIDTH] IN BLOOD BY AUTOMATED COUNT: 13.2 %
ERYTHROCYTE [SEDIMENTATION RATE] IN BLOOD: 20 MM/HOUR (ref 1–20)
GFR SERPL CREATININE-BSD FRML MDRD: 97 ML/MIN/1.73SQ M
GLUCOSE SERPL-MCNC: 81 MG/DL (ref 70–99)
HCT VFR BLD AUTO: 47.8 % (ref 36–46)
HGB BLD-MCNC: 16.2 G/DL (ref 12–16)
LYMPHOCYTES # BLD AUTO: 2.3 THOUSANDS/ΜL (ref 0.5–4)
LYMPHOCYTES NFR BLD AUTO: 36 % (ref 25–45)
MACROCYTES BLD QL AUTO: PRESENT
MCH RBC QN AUTO: 35.9 PG (ref 26–34)
MCHC RBC AUTO-ENTMCNC: 33.9 G/DL (ref 31–36)
MCV RBC AUTO: 106 FL (ref 80–100)
MONOCYTES # BLD AUTO: 0.4 THOUSAND/ΜL (ref 0.2–0.9)
MONOCYTES NFR BLD AUTO: 7 % (ref 1–10)
NEUTROPHILS # BLD AUTO: 3.6 THOUSANDS/ΜL (ref 1.8–7.8)
NEUTS SEG NFR BLD AUTO: 55 % (ref 45–65)
PLATELET # BLD AUTO: 235 THOUSANDS/UL (ref 150–450)
PLATELET BLD QL SMEAR: ADEQUATE
PMV BLD AUTO: 7.7 FL (ref 8.9–12.7)
POTASSIUM SERPL-SCNC: 3.7 MMOL/L (ref 3.6–5)
PROT SERPL-MCNC: 7.8 G/DL (ref 5.9–8.4)
RBC # BLD AUTO: 4.52 MILLION/UL (ref 4–5.2)
RBC MORPH BLD: PRESENT
SODIUM SERPL-SCNC: 141 MMOL/L (ref 137–147)
VIT B12 SERPL-MCNC: 1063 PG/ML (ref 100–900)
WBC # BLD AUTO: 6.5 THOUSAND/UL (ref 4.5–11)

## 2020-03-16 PROCEDURE — 85025 COMPLETE CBC W/AUTO DIFF WBC: CPT

## 2020-03-16 PROCEDURE — 85652 RBC SED RATE AUTOMATED: CPT

## 2020-03-16 PROCEDURE — 82668 ASSAY OF ERYTHROPOIETIN: CPT

## 2020-03-16 PROCEDURE — 86140 C-REACTIVE PROTEIN: CPT

## 2020-03-16 PROCEDURE — 82607 VITAMIN B-12: CPT

## 2020-03-16 PROCEDURE — 80201 ASSAY OF TOPIRAMATE: CPT

## 2020-03-16 PROCEDURE — 36415 COLL VENOUS BLD VENIPUNCTURE: CPT

## 2020-03-16 PROCEDURE — 80053 COMPREHEN METABOLIC PANEL: CPT

## 2020-03-17 ENCOUNTER — TELEPHONE (OUTPATIENT)
Dept: HEMATOLOGY ONCOLOGY | Facility: CLINIC | Age: 50
End: 2020-03-17

## 2020-03-17 ENCOUNTER — DOCUMENTATION (OUTPATIENT)
Dept: HEMATOLOGY ONCOLOGY | Facility: CLINIC | Age: 50
End: 2020-03-17

## 2020-03-17 LAB
EPO SERPL-ACNC: 11.3 MIU/ML (ref 2.6–18.5)
TOPIRAMATE SERPL-MCNC: 6.4 UG/ML (ref 2–25)

## 2020-03-18 ENCOUNTER — TELEPHONE (OUTPATIENT)
Dept: HEMATOLOGY ONCOLOGY | Facility: CLINIC | Age: 50
End: 2020-03-18

## 2020-03-18 DIAGNOSIS — D75.0 POLYCYTHEMIA, FAMILIAL: Primary | ICD-10-CM

## 2020-03-18 DIAGNOSIS — D75.89 MACROCYTOSIS: ICD-10-CM

## 2020-03-18 DIAGNOSIS — Z83.2 FAMILY HISTORY OF HYPERCOAGULABILITY: ICD-10-CM

## 2020-03-18 NOTE — TELEPHONE ENCOUNTER
Inbound Calls to Reschedule/Cancel Appointments   Patient Details:  Jarrett Dc  1970  [id number]     Who is calling? Shruthi   Date of original appointment 1970   Visit Type Follow up    Who is the Provider and Location? [facility]    Is the patient on active treatment? Chemo? Radiation? no   Was the patient Rescheduled or Canceled? Cancel   Reason for rescheduling or cancelation? Corona Virus   Next Steps:    [facility]: After completing the above information, please route to the appropriate Care Team for review  Care Team: Please review and reach out to the patient if you have any changes

## 2020-03-26 DIAGNOSIS — G43.009 MIGRAINE WITHOUT AURA AND WITHOUT STATUS MIGRAINOSUS, NOT INTRACTABLE: ICD-10-CM

## 2020-03-26 DIAGNOSIS — G40.909 SEIZURE DISORDER (HCC): ICD-10-CM

## 2020-03-26 RX ORDER — BUTALBITAL, ACETAMINOPHEN AND CAFFEINE 50; 325; 40 MG/1; MG/1; MG/1
TABLET ORAL
Qty: 15 TABLET | Refills: 0 | Status: SHIPPED | OUTPATIENT
Start: 2020-03-26 | End: 2020-04-23 | Stop reason: SDUPTHER

## 2020-03-26 NOTE — TELEPHONE ENCOUNTER
butalbital-acetaminophen-caffeine (FIORICET,ESGIC) -40 mg per tablet    topiramate (TOPAMAX) 200 MG tablet     Medication refill check list    Correct patient? yes   Correct medication name, dose, and pill size? yes   Correct provider? yes   Last and Next appt  scheduled? Yes, last date 09/20/19 & next date 07/24/20   Right pharmacy listed? yes   Correct quantity for 30 or 90 days? yes   Is the patient out of refills? When was it last prescribed? Yes, last date02/05/20, and 09/12/19   Directions match what the patient says they are taking?  yes   Enough refills? (none for controlled substances, 1 year for routine medications) yes

## 2020-04-23 DIAGNOSIS — G43.009 MIGRAINE WITHOUT AURA AND WITHOUT STATUS MIGRAINOSUS, NOT INTRACTABLE: ICD-10-CM

## 2020-04-23 RX ORDER — BUTALBITAL, ACETAMINOPHEN AND CAFFEINE 50; 325; 40 MG/1; MG/1; MG/1
TABLET ORAL
Qty: 15 TABLET | Refills: 0 | Status: SHIPPED | OUTPATIENT
Start: 2020-04-23 | End: 2020-05-20 | Stop reason: SDUPTHER

## 2020-05-04 ENCOUNTER — TELEPHONE (OUTPATIENT)
Dept: NEUROLOGY | Facility: CLINIC | Age: 50
End: 2020-05-04

## 2020-05-08 ENCOUNTER — TELEMEDICINE (OUTPATIENT)
Dept: NEUROLOGY | Facility: CLINIC | Age: 50
End: 2020-05-08
Payer: COMMERCIAL

## 2020-05-08 ENCOUNTER — TELEPHONE (OUTPATIENT)
Dept: NEUROLOGY | Facility: CLINIC | Age: 50
End: 2020-05-08

## 2020-05-08 VITALS — BODY MASS INDEX: 34.55 KG/M2 | HEIGHT: 63 IN | WEIGHT: 195 LBS

## 2020-05-08 DIAGNOSIS — G40.909 SEIZURE DISORDER (HCC): ICD-10-CM

## 2020-05-08 DIAGNOSIS — G43.009 MIGRAINE WITHOUT AURA AND WITHOUT STATUS MIGRAINOSUS, NOT INTRACTABLE: Primary | ICD-10-CM

## 2020-05-08 DIAGNOSIS — G47.9 SLEEP DISTURBANCE: ICD-10-CM

## 2020-05-08 PROCEDURE — 99441 PR PHYS/QHP TELEPHONE EVALUATION 5-10 MIN: CPT | Performed by: PSYCHIATRY & NEUROLOGY

## 2020-05-08 RX ORDER — TIZANIDINE 4 MG/1
4 TABLET ORAL
COMMUNITY
Start: 2020-04-22

## 2020-05-20 DIAGNOSIS — G43.009 MIGRAINE WITHOUT AURA AND WITHOUT STATUS MIGRAINOSUS, NOT INTRACTABLE: ICD-10-CM

## 2020-05-20 RX ORDER — BUTALBITAL, ACETAMINOPHEN AND CAFFEINE 50; 325; 40 MG/1; MG/1; MG/1
TABLET ORAL
Qty: 15 TABLET | Refills: 0 | Status: SHIPPED | OUTPATIENT
Start: 2020-05-20 | End: 2020-06-30 | Stop reason: SDUPTHER

## 2020-06-30 DIAGNOSIS — G43.009 MIGRAINE WITHOUT AURA AND WITHOUT STATUS MIGRAINOSUS, NOT INTRACTABLE: ICD-10-CM

## 2020-06-30 RX ORDER — BUTALBITAL, ACETAMINOPHEN AND CAFFEINE 50; 325; 40 MG/1; MG/1; MG/1
TABLET ORAL
Qty: 15 TABLET | Refills: 0 | Status: SHIPPED | OUTPATIENT
Start: 2020-06-30 | End: 2020-08-04 | Stop reason: SDUPTHER

## 2020-07-09 ENCOUNTER — TRANSCRIBE ORDERS (OUTPATIENT)
Dept: ADMINISTRATIVE | Facility: HOSPITAL | Age: 50
End: 2020-07-09

## 2020-07-09 DIAGNOSIS — M85.80 OSTEOPENIA, UNSPECIFIED LOCATION: Primary | ICD-10-CM

## 2020-07-09 DIAGNOSIS — Z85.3 HX OF BREAST CANCER: ICD-10-CM

## 2020-07-15 ENCOUNTER — TELEPHONE (OUTPATIENT)
Dept: SURGERY | Facility: HOSPITAL | Age: 50
End: 2020-07-15

## 2020-07-16 ENCOUNTER — HOSPITAL ENCOUNTER (OUTPATIENT)
Dept: INTERVENTIONAL RADIOLOGY/VASCULAR | Facility: HOSPITAL | Age: 50
Discharge: HOME | End: 2020-07-16
Payer: COMMERCIAL

## 2020-07-16 VITALS
WEIGHT: 195 LBS | TEMPERATURE: 97.9 F | OXYGEN SATURATION: 99 % | RESPIRATION RATE: 16 BRPM | HEART RATE: 79 BPM | HEIGHT: 63 IN | DIASTOLIC BLOOD PRESSURE: 74 MMHG | BODY MASS INDEX: 34.55 KG/M2 | SYSTOLIC BLOOD PRESSURE: 105 MMHG

## 2020-07-16 DIAGNOSIS — M12.88 OTHER SPECIFIC ARTHROPATHIES, NOT ELSEWHERE CLASSIFIED, OTHER SPECIFIED SITE: ICD-10-CM

## 2020-07-16 DIAGNOSIS — M47.892 OTHER SPONDYLOSIS, CERVICAL REGION: ICD-10-CM

## 2020-07-16 PROCEDURE — 99153 MOD SED SAME PHYS/QHP EA: CPT

## 2020-07-16 PROCEDURE — 99152 MOD SED SAME PHYS/QHP 5/>YRS: CPT

## 2020-07-16 RX ORDER — LIDOCAINE HYDROCHLORIDE 10 MG/ML
INJECTION, SOLUTION EPIDURAL; INFILTRATION; INTRACAUDAL; PERINEURAL CODE/TRAUMA/SEDATION MEDICATION
Status: COMPLETED | OUTPATIENT
Start: 2020-07-16 | End: 2020-07-16

## 2020-07-16 RX ORDER — FENTANYL CITRATE 50 UG/ML
INJECTION, SOLUTION INTRAMUSCULAR; INTRAVENOUS CODE/TRAUMA/SEDATION MEDICATION
Status: COMPLETED | OUTPATIENT
Start: 2020-07-16 | End: 2020-07-16

## 2020-07-16 RX ORDER — LIDOCAINE WITH 8.4% SOD BICARB 0.9%(10ML)
SYRINGE (ML) INJECTION CODE/TRAUMA/SEDATION MEDICATION
Status: COMPLETED | OUTPATIENT
Start: 2020-07-16 | End: 2020-07-16

## 2020-07-16 RX ORDER — MIDAZOLAM HYDROCHLORIDE 2 MG/2ML
INJECTION, SOLUTION INTRAMUSCULAR; INTRAVENOUS CODE/TRAUMA/SEDATION MEDICATION
Status: COMPLETED | OUTPATIENT
Start: 2020-07-16 | End: 2020-07-16

## 2020-07-16 RX ORDER — SODIUM CHLORIDE 9 MG/ML
50 INJECTION, SOLUTION INTRAVENOUS CONTINUOUS
Status: DISCONTINUED | OUTPATIENT
Start: 2020-07-16 | End: 2020-07-20 | Stop reason: HOSPADM

## 2020-07-16 RX ORDER — KETOROLAC TROMETHAMINE 30 MG/ML
INJECTION, SOLUTION INTRAMUSCULAR; INTRAVENOUS CODE/TRAUMA/SEDATION MEDICATION
Status: COMPLETED | OUTPATIENT
Start: 2020-07-16 | End: 2020-07-16

## 2020-07-16 RX ORDER — SODIUM CHLORIDE 9 MG/ML
50 INJECTION, SOLUTION INTRAVENOUS CONTINUOUS
Status: CANCELLED | OUTPATIENT
Start: 2020-07-16

## 2020-07-16 RX ADMIN — LIDOCAINE HYDROCHLORIDE 6 ML: 10 INJECTION, SOLUTION EPIDURAL; INFILTRATION; INTRACAUDAL; PERINEURAL at 12:46

## 2020-07-16 RX ADMIN — FENTANYL CITRATE 50 MCG: 50 INJECTION INTRAMUSCULAR; INTRAVENOUS at 12:43

## 2020-07-16 RX ADMIN — Medication 3 ML: at 12:46

## 2020-07-16 RX ADMIN — SODIUM CHLORIDE 50 ML/HR: 0.9 INJECTION, SOLUTION INTRAVENOUS at 11:35

## 2020-07-16 RX ADMIN — MIDAZOLAM HYDROCHLORIDE 1 MG: 1 INJECTION, SOLUTION INTRAMUSCULAR; INTRAVENOUS at 12:45

## 2020-07-16 RX ADMIN — KETOROLAC TROMETHAMINE 30 MG: 30 INJECTION, SOLUTION INTRAMUSCULAR; INTRAVENOUS at 12:40

## 2020-07-16 RX ADMIN — MIDAZOLAM HYDROCHLORIDE 1 MG: 1 INJECTION, SOLUTION INTRAMUSCULAR; INTRAVENOUS at 12:43

## 2020-07-16 NOTE — H&P
History of Present Illness: The patient is a 48 y o  female who presents with complaints of cervical spondylosis, right cervical facet joint syndrome, positive response to diagnostic cervical medial branch blocks, right cervicalgia  Patient Active Problem List   Diagnosis    Migraine without aura and without status migrainosus, not intractable    Seizure disorder (HCC)    Near syncope    Polycythemia, familial    Family history of hypercoagulability    Sleep disturbance       Past Medical History:   Diagnosis Date    Diverticulosis     Hiatal hernia     Migraine     Migraines     Osteopenia     Peptic ulcer     Polycythemia     PVCs (premature ventricular contractions)     Seizure disorder (HCC)        Past Surgical History:   Procedure Laterality Date    APPENDECTOMY      "1990's"    BONE MARROW BIOPSY W/ ASPIRATION  03/2019    COLONOSCOPY  2011    HYSTERECTOMY      IR BONE MARROW BIOPSY/ASPIRATION  2/26/2019    NECK SURGERY Right     resection of a mass from the posterior neck     SHOULDER SURGERY Right     2 surgeries         Current Outpatient Medications:     aspirin (ECOTRIN LOW STRENGTH) 81 mg EC tablet, Take 81 mg by mouth, Disp: , Rfl:     butalbital-acetaminophen-caffeine (FIORICET,ESGIC) -40 mg per tablet, By mouth take 1 tablet for migraine  May repeat in 4 hours if needed  Limit 2 tablets per 24 hours  30 day supply  , Disp: 15 tablet, Rfl: 0    Calcium Carbonate-Vitamin D3 600-400 MG-UNIT TABS, Take 1 tablet by mouth 2 (two) times a day , Disp: , Rfl:     gabapentin (NEURONTIN) 300 mg capsule, By mouth take 1 capsule twice daily and 2 capsules at bedtime daily, Disp: 360 capsule, Rfl: 1    HYDROcodone Bitartrate ER (ZOHYDRO ER) 30 MG CP12, Take by mouth 2 (two) times a day , Disp: , Rfl:     HYDROcodone-acetaminophen (NORCO) 5-325 mg per tablet, TAKE 1 TABLET 2-3 TIMES DAILY AS NEEDED, Disp: , Rfl: 0    LINZESS 145 MCG CAPS, TAKE 1 CAPSULE BEFORE BREAKFAST, Disp: , Rfl: 3    LORazepam (ATIVAN) 0 5 mg tablet, 0 5 mg 2 (two) times a day as needed, Disp: , Rfl: 1    omeprazole (PRILOSEC) 20 mg delayed release capsule, Take 40 mg by mouth daily , Disp: , Rfl:     tiZANidine (ZANAFLEX) 4 mg tablet, Take 4 mg by mouth daily at bedtime as needed, Disp: , Rfl:     topiramate (TOPAMAX) 200 MG tablet, Take 1 tablet (200 mg total) by mouth 2 (two) times a day, Disp: 180 tablet, Rfl: 1    Allergies   Allergen Reactions    Amitriptyline Other (See Comments)     confusion    Propranolol Other (See Comments)     Other reaction(s): Other (See Comments)  Distension abdominal       Physical Exam: There were no vitals filed for this visit    General: Awake, Alert, Oriented x 3, Mood and affect appropriate  Respiratory: Respirations even and unlabored  Cardiovascular: Peripheral pulses intact; no edema  Musculoskeletal Exam:  Positive cervical facet loading test right side, limited cervical range of motion, right tender cervical facets, negative Spurling's test       Assessment: cervical spondylosis    Plan:  Right C3/4-C4/5 C5-6 the cervical facet ablation rhizotomy under fluoroscopic guidance

## 2020-07-29 ENCOUNTER — HOSPITAL ENCOUNTER (OUTPATIENT)
Dept: BONE DENSITY | Facility: CLINIC | Age: 50
Discharge: HOME | End: 2020-07-29
Payer: COMMERCIAL

## 2020-07-29 DIAGNOSIS — M85.80 OSTEOPENIA, UNSPECIFIED LOCATION: ICD-10-CM

## 2020-07-29 PROCEDURE — 77080 DXA BONE DENSITY AXIAL: CPT

## 2020-08-04 DIAGNOSIS — G43.009 MIGRAINE WITHOUT AURA AND WITHOUT STATUS MIGRAINOSUS, NOT INTRACTABLE: ICD-10-CM

## 2020-08-04 RX ORDER — BUTALBITAL, ACETAMINOPHEN AND CAFFEINE 50; 325; 40 MG/1; MG/1; MG/1
TABLET ORAL
Qty: 15 TABLET | Refills: 0 | Status: SHIPPED | OUTPATIENT
Start: 2020-08-04 | End: 2020-08-31 | Stop reason: SDUPTHER

## 2020-08-04 NOTE — TELEPHONE ENCOUNTER
Patient left a message on the script line asking for a refill  Last script was given for a quantity of 15 on 6/30/20  Patient last had a telemedicine visit on 5/8/20, and does not have a follow up scheduled at this time  Please authorize if appropriate

## 2020-08-05 NOTE — TELEPHONE ENCOUNTER
Called pt this morning 8/5 due to VM left on machine - pt is scheduled for 8/18 @ 2:10p in [place] with Dr Rhonda Mac  Please fill prescription up to appt time

## 2020-08-07 ENCOUNTER — TELEPHONE (OUTPATIENT)
Dept: HEMATOLOGY ONCOLOGY | Facility: CLINIC | Age: 50
End: 2020-08-07

## 2020-08-07 NOTE — TELEPHONE ENCOUNTER
Patient has f/u apt on Wed 9/23 w/Dr Jonah Eller  Called pt to reschedule apt as Dr Jonah Eller will not be in the office  Patient rescheduled apt for Friday 10/02 at 1:20 p m  w/Dr Jonah Eller at [address] [place] [address]

## 2020-08-18 ENCOUNTER — TELEMEDICINE (OUTPATIENT)
Dept: NEUROLOGY | Facility: CLINIC | Age: 50
End: 2020-08-18
Payer: COMMERCIAL

## 2020-08-18 VITALS — WEIGHT: 200 LBS | HEIGHT: 63 IN | BODY MASS INDEX: 35.44 KG/M2

## 2020-08-18 DIAGNOSIS — G47.9 SLEEP DISTURBANCE: ICD-10-CM

## 2020-08-18 DIAGNOSIS — G40.909 SEIZURE DISORDER (HCC): ICD-10-CM

## 2020-08-18 DIAGNOSIS — G43.009 MIGRAINE WITHOUT AURA AND WITHOUT STATUS MIGRAINOSUS, NOT INTRACTABLE: Primary | ICD-10-CM

## 2020-08-18 PROCEDURE — 99441 PR PHYS/QHP TELEPHONE EVALUATION 5-10 MIN: CPT | Performed by: PSYCHIATRY & NEUROLOGY

## 2020-08-18 RX ORDER — GABAPENTIN 300 MG/1
CAPSULE ORAL
Qty: 360 CAPSULE | Refills: 1 | Status: SHIPPED | OUTPATIENT
Start: 2020-08-18 | End: 2021-02-24 | Stop reason: SDUPTHER

## 2020-08-18 NOTE — PROGRESS NOTES
Virtual Brief Visit    Assessment/Plan:    Problem List Items Addressed This Visit        Cardiovascular and Mediastinum    Migraine without aura and without status migrainosus, not intractable - Primary     Continues to do well on her current preventative medications schedule  Tolerating her topiramate and gabapentin schedules with no reported adverse side effects  Remains pleased with her overall control  --continue gabapentin 300 mg capsules taking 1 capsule twice daily and 2 at bedtime daily  --continue topiramate 200 mg twice daily  --continue judicious use of Fioricet for symptomatic purposes  --patient does have upcoming blood work to include CBC and CMP to be drawn in late September  To that I will be adding a topiramate level  Relevant Medications    topiramate (TOPAMAX) 200 MG tablet    gabapentin (NEURONTIN) 300 mg capsule    Other Relevant Orders    Topiramate level       Nervous and Auditory    Seizure disorder ([facility] [address] )     Also doing well with no seizure or seizure-like symptoms reported  Last event August 30, 2010  Most recent EEG a normal study  --on topiramate 200 mg twice daily for dual purposes  Relevant Medications    topiramate (TOPAMAX) 200 MG tablet    gabapentin (NEURONTIN) 300 mg capsule       Other    Sleep disturbance     Complaints of early night insomnia, multiple awakenings along with significant movements in sleep  --ambulatory referral to sleep medicine already placed  Patient will now call for an appointment  Neurology follow-up set for 6 months or p r n         Reason for visit is   Chief Complaint   Patient presents with    Virtual Brief Visit        Encounter provider Khushi Romero MD    Provider located at [address] [id number]    Recent Visits  No visits were found meeting these conditions     Showing recent visits within past 7 days and meeting all other requirements     Today's Visits  Date Type Provider Dept   08/18/20 Telemedicine Carlos Camarena MD [facility] [place]   Showing today's visits and meeting all other requirements     Future Appointments  No visits were found meeting these conditions  Showing future appointments within next 150 days and meeting all other requirements        After connecting through telephone, the patient was identified by name and date of birth  Jacy Gross was informed that this is a telemedicine visit and that the visit is being conducted through telephone  My office door was closed  No one else was in the room  She acknowledged consent and understanding of privacy and security of the platform  The patient has agreed to participate and understands she can discontinue the visit at any time  Patient is aware this is a billable service  Marilyn Maldonado is a 48 y o  female who continues her ongoing follow-up with Neurology with her migraine as well as seizure diagnosis  She has migraine without aura and fortunately has been doing very well since slight modification in her preventative medications schedule  Presently taking topiramate 200 mg twice daily along with gabapentin 300 mg capsules 1 twice daily and 2 at bedtime daily  On this schedule she has only an occasional migraine and is very side despite with the symptomatic response to the judicious use of Fioricet  Expresses no symptoms to suggest any adverse side effects from her medication  She also has a seizure disorder  Her last event occurred on August 30, 2010  Her most recent EEG, done in 2018 was a normal awake, drowsing and sleep study  She continues on her topiramate as noted above for dual purposes  There is also issues with sleep, remark ink that she has early night insomnia, multiple awakenings and reported significant movements in sleep    She has an ambulatory referral to sleep medicine but has not as yet made that appointment  Past Medical History:   Diagnosis Date    Diverticulosis     Hiatal hernia     Migraine     Migraines     Osteopenia     Peptic ulcer     Polycythemia     PVCs (premature ventricular contractions)     Seizure disorder (HCC)        Past Surgical History:   Procedure Laterality Date    APPENDECTOMY      "1990's"    BONE MARROW BIOPSY W/ ASPIRATION  03/2019    COLONOSCOPY  2011    HYSTERECTOMY      IR BONE MARROW BIOPSY/ASPIRATION  2/26/2019    NECK SURGERY Right     resection of a mass from the posterior neck     SHOULDER SURGERY Right     2 surgeries       Current Outpatient Medications   Medication Sig Dispense Refill    aspirin (ECOTRIN LOW STRENGTH) 81 mg EC tablet Take 81 mg by mouth      butalbital-acetaminophen-caffeine (FIORICET,ESGIC) -40 mg per tablet By mouth take 1 tablet for migraine  May repeat in 4 hours if needed  Limit 2 tablets per 24 hours  30 day supply  15 tablet 0    Calcium Carbonate-Vitamin D3 600-400 MG-UNIT TABS Take 1 tablet by mouth 2 (two) times a day       gabapentin (NEURONTIN) 300 mg capsule By mouth take 1 capsule twice daily and 2 capsules at bedtime daily 360 capsule 1    HYDROcodone Bitartrate ER (ZOHYDRO ER) 30 MG CP12 Take by mouth 2 (two) times a day       HYDROcodone-acetaminophen (NORCO) 5-325 mg per tablet TAKE 1 TABLET 2-3 TIMES DAILY AS NEEDED  0    LINZESS 145 MCG CAPS TAKE 1 CAPSULE BEFORE BREAKFAST  3    omeprazole (PRILOSEC) 20 mg delayed release capsule Take 40 mg by mouth daily       tiZANidine (ZANAFLEX) 4 mg tablet Take 4 mg by mouth daily at bedtime as needed      topiramate (TOPAMAX) 200 MG tablet Take 1 tablet (200 mg total) by mouth 2 (two) times a day 180 tablet 1    LORazepam (ATIVAN) 0 5 mg tablet 0 5 mg 2 (two) times a day as needed  1     No current facility-administered medications for this visit           Allergies   Allergen Reactions    Amitriptyline Other (See Comments)     confusion    Propranolol Other (See Comments)     Other reaction(s): Other (See Comments)  Distension abdominal       Review of Systems   Constitutional: Negative  Negative for appetite change and fever  HENT: Negative  Negative for hearing loss, tinnitus, trouble swallowing and voice change  Eyes: Negative  Negative for photophobia and pain  Respiratory: Negative  Negative for shortness of breath  Cardiovascular: Negative  Negative for palpitations  Gastrointestinal: Positive for constipation  Negative for nausea and vomiting  Endocrine: Negative  Negative for cold intolerance  Genitourinary: Negative  Negative for dysuria, frequency and urgency  Musculoskeletal: Positive for neck pain and neck stiffness  Negative for myalgias  Skin: Negative  Negative for rash  Allergic/Immunologic: Negative  Neurological: Positive for headaches  Negative for dizziness, tremors, seizures, syncope, facial asymmetry, speech difficulty, weakness, light-headedness and numbness  Hematological: Negative  Does not bruise/bleed easily  Psychiatric/Behavioral: Positive for sleep disturbance  Negative for confusion and hallucinations  The patient is nervous/anxious  Vitals:    08/18/20 1401   Weight: 90 7 kg (200 lb)   Height: 5' 3" (1 6 m)         I spent 7 minutes directly with the patient during this visit    VIRTUAL VISIT DISCLAIMER    Geoff Cuellar acknowledges that she has consented to an online visit or consultation  She understands that the online visit is based solely on information provided by her, and that, in the absence of a face-to-face physical evaluation by the physician, the diagnosis she receives is both limited and provisional in terms of accuracy and completeness  This is not intended to replace a full medical face-to-face evaluation by the physician  Geoff Cuellar understands and accepts these terms

## 2020-08-18 NOTE — ASSESSMENT & PLAN NOTE
Continues to do well on her current preventative medications schedule  Tolerating her topiramate and gabapentin schedules with no reported adverse side effects  Remains pleased with her overall control  --continue gabapentin 300 mg capsules taking 1 capsule twice daily and 2 at bedtime daily  --continue topiramate 200 mg twice daily  --continue judicious use of Fioricet for symptomatic purposes  --patient does have upcoming blood work to include CBC and CMP to be drawn in late September  To that I will be adding a topiramate level

## 2020-08-18 NOTE — ASSESSMENT & PLAN NOTE
Complaints of early night insomnia, multiple awakenings along with significant movements in sleep  --ambulatory referral to sleep medicine already placed  Patient will now call for an appointment

## 2020-08-18 NOTE — ASSESSMENT & PLAN NOTE
Also doing well with no seizure or seizure-like symptoms reported  Last event August 30, 2010  Most recent EEG a normal study  --on topiramate 200 mg twice daily for dual purposes

## 2020-08-18 NOTE — LETTER
August 18, 2020     Tasneem Bunch MD  [address]    Patient: Michaelle Monroe   YOB: 1970   Date of Visit: 8/18/2020       Dear Dr Parish Swanson:    Thank you for referring Michaelle Monroe to me for evaluation  Below are my notes for this consultation  If you have questions, please do not hesitate to call me  I look forward to following your patient along with you  Sincerely,        Eri Benton MD        CC: No Recipients  Eri Benton MD  8/18/2020  2:19 PM  Sign when Signing Visit    Virtual Brief Visit    Assessment/Plan:    Problem List Items Addressed This Visit        Cardiovascular and Mediastinum    Migraine without aura and without status migrainosus, not intractable - Primary     Continues to do well on her current preventative medications schedule  Tolerating her topiramate and gabapentin schedules with no reported adverse side effects  Remains pleased with her overall control  --continue gabapentin 300 mg capsules taking 1 capsule twice daily and 2 at bedtime daily  --continue topiramate 200 mg twice daily  --continue judicious use of Fioricet for symptomatic purposes  --patient does have upcoming blood work to include CBC and CMP to be drawn in late September  To that I will be adding a topiramate level  Relevant Medications    topiramate (TOPAMAX) 200 MG tablet    gabapentin (NEURONTIN) 300 mg capsule    Other Relevant Orders    Topiramate level       Nervous and Auditory    Seizure disorder ([facility] [address] )     Also doing well with no seizure or seizure-like symptoms reported  Last event August 30, 2010  Most recent EEG a normal study  --on topiramate 200 mg twice daily for dual purposes           Relevant Medications    topiramate (TOPAMAX) 200 MG tablet    gabapentin (NEURONTIN) 300 mg capsule       Other    Sleep disturbance     Complaints of early night insomnia, multiple awakenings along with significant movements in sleep  --ambulatory referral to sleep medicine already placed  Patient will now call for an appointment  Neurology follow-up set for 6 months or p r n         Reason for visit is   Chief Complaint   Patient presents with    Virtual Brief Visit        Encounter provider Khushi Romero MD    Provider located at [address] [address]    Recent Visits  No visits were found meeting these conditions  Showing recent visits within past 7 days and meeting all other requirements     Today's Visits  Date Type Provider Dept   08/18/20 [address], MD [facility]   Showing today's visits and meeting all other requirements     Future Appointments  No visits were found meeting these conditions  Showing future appointments within next 150 days and meeting all other requirements        After connecting through telephone, the patient was identified by name and date of birth  Sasha Avina was informed that this is a telemedicine visit and that the visit is being conducted through telephone  My office door was closed  No one else was in the room  She acknowledged consent and understanding of privacy and security of the platform  The patient has agreed to participate and understands she can discontinue the visit at any time  Patient is aware this is a billable service  Marla Dean is a 48 y o  female who continues her ongoing follow-up with Neurology with her migraine as well as seizure diagnosis  She has migraine without aura and fortunately has been doing very well since slight modification in her preventative medications schedule  Presently taking topiramate 200 mg twice daily along with gabapentin 300 mg capsules 1 twice daily and 2 at bedtime daily    On this schedule she has only an occasional migraine and is very side despite with the symptomatic response to the judicious use of Fioricet  Expresses no symptoms to suggest any adverse side effects from her medication  She also has a seizure disorder  Her last event occurred on August 30, 2010  Her most recent EEG, done in 2018 was a normal awake, drowsing and sleep study  She continues on her topiramate as noted above for dual purposes  There is also issues with sleep, remark ink that she has early night insomnia, multiple awakenings and reported significant movements in sleep  She has an ambulatory referral to sleep medicine but has not as yet made that appointment  Past Medical History:   Diagnosis Date    Diverticulosis     Hiatal hernia     Migraine     Migraines     Osteopenia     Peptic ulcer     Polycythemia     PVCs (premature ventricular contractions)     Seizure disorder (HCC)        Past Surgical History:   Procedure Laterality Date    APPENDECTOMY      "1990's"    BONE MARROW BIOPSY W/ ASPIRATION  03/2019    COLONOSCOPY  2011    HYSTERECTOMY      IR BONE MARROW BIOPSY/ASPIRATION  2/26/2019    NECK SURGERY Right     resection of a mass from the posterior neck     SHOULDER SURGERY Right     2 surgeries       Current Outpatient Medications   Medication Sig Dispense Refill    aspirin (ECOTRIN LOW STRENGTH) 81 mg EC tablet Take 81 mg by mouth      butalbital-acetaminophen-caffeine (FIORICET,ESGIC) -40 mg per tablet By mouth take 1 tablet for migraine  May repeat in 4 hours if needed  Limit 2 tablets per 24 hours  30 day supply   15 tablet 0    Calcium Carbonate-Vitamin D3 600-400 MG-UNIT TABS Take 1 tablet by mouth 2 (two) times a day       gabapentin (NEURONTIN) 300 mg capsule By mouth take 1 capsule twice daily and 2 capsules at bedtime daily 360 capsule 1    HYDROcodone Bitartrate ER (ZOHYDRO ER) 30 MG CP12 Take by mouth 2 (two) times a day       HYDROcodone-acetaminophen (NORCO) 5-325 mg per tablet TAKE 1 TABLET 2-3 TIMES DAILY AS NEEDED  0    LINZESS 145 MCG CAPS TAKE 1 CAPSULE BEFORE BREAKFAST  3    omeprazole (PRILOSEC) 20 mg delayed release capsule Take 40 mg by mouth daily       tiZANidine (ZANAFLEX) 4 mg tablet Take 4 mg by mouth daily at bedtime as needed      topiramate (TOPAMAX) 200 MG tablet Take 1 tablet (200 mg total) by mouth 2 (two) times a day 180 tablet 1    LORazepam (ATIVAN) 0 5 mg tablet 0 5 mg 2 (two) times a day as needed  1     No current facility-administered medications for this visit  Allergies   Allergen Reactions    Amitriptyline Other (See Comments)     confusion    Propranolol Other (See Comments)     Other reaction(s): Other (See Comments)  Distension abdominal       Review of Systems   Constitutional: Negative  Negative for appetite change and fever  HENT: Negative  Negative for hearing loss, tinnitus, trouble swallowing and voice change  Eyes: Negative  Negative for photophobia and pain  Respiratory: Negative  Negative for shortness of breath  Cardiovascular: Negative  Negative for palpitations  Gastrointestinal: Positive for constipation  Negative for nausea and vomiting  Endocrine: Negative  Negative for cold intolerance  Genitourinary: Negative  Negative for dysuria, frequency and urgency  Musculoskeletal: Positive for neck pain and neck stiffness  Negative for myalgias  Skin: Negative  Negative for rash  Allergic/Immunologic: Negative  Neurological: Positive for headaches  Negative for dizziness, tremors, seizures, syncope, facial asymmetry, speech difficulty, weakness, light-headedness and numbness  Hematological: Negative  Does not bruise/bleed easily  Psychiatric/Behavioral: Positive for sleep disturbance  Negative for confusion and hallucinations  The patient is nervous/anxious          Vitals:    08/18/20 1401   Weight: 90 7 kg (200 lb)   Height: 5' 3" (1 6 m)         I spent 7 minutes directly with the patient during this visit    VIRTUAL VISIT [facility]  acknowledges that she has consented to an online visit or consultation  She understands that the online visit is based solely on information provided by her, and that, in the absence of a face-to-face physical evaluation by the physician, the diagnosis she receives is both limited and provisional in terms of accuracy and completeness  This is not intended to replace a full medical face-to-face evaluation by the physician  Marylou Rodriguez understands and accepts these terms

## 2020-08-26 ENCOUNTER — TELEPHONE (OUTPATIENT)
Dept: NEUROLOGY | Facility: CLINIC | Age: 50
End: 2020-08-26

## 2020-08-26 NOTE — TELEPHONE ENCOUNTER
In review it does not appear that she needs to have a topiramate level drawn as part of her upcoming blood work  Past level was fine  You can tell her that I would canceled the topiramate level  Thanks

## 2020-08-26 NOTE — TELEPHONE ENCOUNTER
Patient calling about her topiramate level  States that she last got this level checked in March of this year  Asking if she should still get her level checked next month  Informed her that it looks like she has been getting her level checked every 6 months  Did you still want patient to get her level checked? She did say that she will get her level checked  Does not need a call back if she should get her topiramate level drawn      Please advise

## 2020-08-27 ENCOUNTER — HOSPITAL ENCOUNTER (OUTPATIENT)
Dept: MAMMOGRAPHY | Facility: CLINIC | Age: 50
Discharge: HOME | End: 2020-08-27
Payer: COMMERCIAL

## 2020-08-27 VITALS — WEIGHT: 200 LBS | BODY MASS INDEX: 35.44 KG/M2 | HEIGHT: 63 IN

## 2020-08-27 DIAGNOSIS — Z85.3 HX OF BREAST CANCER: ICD-10-CM

## 2020-08-27 DIAGNOSIS — N64.4 BREAST PAIN: ICD-10-CM

## 2020-08-27 PROCEDURE — G0279 TOMOSYNTHESIS, MAMMO: HCPCS

## 2020-08-27 PROCEDURE — 77066 DX MAMMO INCL CAD BI: CPT

## 2020-08-27 PROCEDURE — 76642 ULTRASOUND BREAST LIMITED: CPT

## 2020-08-31 DIAGNOSIS — G43.009 MIGRAINE WITHOUT AURA AND WITHOUT STATUS MIGRAINOSUS, NOT INTRACTABLE: ICD-10-CM

## 2020-08-31 RX ORDER — BUTALBITAL, ACETAMINOPHEN AND CAFFEINE 50; 325; 40 MG/1; MG/1; MG/1
TABLET ORAL
Qty: 15 TABLET | Refills: 0 | Status: SHIPPED | OUTPATIENT
Start: 2020-08-31 | End: 2020-10-02 | Stop reason: SDUPTHER

## 2020-09-28 ENCOUNTER — TELEPHONE (OUTPATIENT)
Dept: HEMATOLOGY ONCOLOGY | Facility: CLINIC | Age: 50
End: 2020-09-28

## 2020-09-29 ENCOUNTER — APPOINTMENT (OUTPATIENT)
Dept: LAB | Facility: HOSPITAL | Age: 50
End: 2020-09-29
Payer: COMMERCIAL

## 2020-09-29 DIAGNOSIS — D75.0 POLYCYTHEMIA, FAMILIAL: ICD-10-CM

## 2020-09-29 LAB
ALBUMIN SERPL BCP-MCNC: 4 G/DL (ref 3–5.2)
ALP SERPL-CCNC: 74 U/L (ref 43–122)
ALT SERPL W P-5'-P-CCNC: 31 U/L (ref 9–52)
ANION GAP SERPL CALCULATED.3IONS-SCNC: 7 MMOL/L (ref 5–14)
ANISOCYTOSIS BLD QL SMEAR: PRESENT
AST SERPL W P-5'-P-CCNC: 30 U/L (ref 14–36)
BASOPHILS # BLD AUTO: 0 THOUSANDS/ΜL (ref 0–0.1)
BASOPHILS NFR BLD AUTO: 1 % (ref 0–1)
BILIRUB SERPL-MCNC: 0.3 MG/DL
BUN SERPL-MCNC: 14 MG/DL (ref 5–25)
CALCIUM SERPL-MCNC: 9.2 MG/DL (ref 8.4–10.2)
CHLORIDE SERPL-SCNC: 111 MMOL/L (ref 97–108)
CO2 SERPL-SCNC: 22 MMOL/L (ref 22–30)
CREAT SERPL-MCNC: 0.84 MG/DL (ref 0.6–1.2)
CRP SERPL QL: 9 MG/L
EOSINOPHIL # BLD AUTO: 0.1 THOUSAND/ΜL (ref 0–0.4)
EOSINOPHIL NFR BLD AUTO: 2 % (ref 0–6)
ERYTHROCYTE [DISTWIDTH] IN BLOOD BY AUTOMATED COUNT: 13.9 %
ERYTHROCYTE [SEDIMENTATION RATE] IN BLOOD: 17 MM/HOUR (ref 0–29)
GFR SERPL CREATININE-BSD FRML MDRD: 94 ML/MIN/1.73SQ M
GLUCOSE SERPL-MCNC: 85 MG/DL (ref 70–99)
HCT VFR BLD AUTO: 45.8 % (ref 36–46)
HGB BLD-MCNC: 15.4 G/DL (ref 12–16)
LDH SERPL-CCNC: 504 U/L (ref 313–618)
LYMPHOCYTES # BLD AUTO: 2.6 THOUSANDS/ΜL (ref 0.5–4)
LYMPHOCYTES NFR BLD AUTO: 42 % (ref 25–45)
MACROCYTES BLD QL AUTO: PRESENT
MCH RBC QN AUTO: 36 PG (ref 26–34)
MCHC RBC AUTO-ENTMCNC: 33.7 G/DL (ref 31–36)
MCV RBC AUTO: 107 FL (ref 80–100)
MONOCYTES # BLD AUTO: 0.5 THOUSAND/ΜL (ref 0.2–0.9)
MONOCYTES NFR BLD AUTO: 8 % (ref 1–10)
NEUTROPHILS # BLD AUTO: 2.9 THOUSANDS/ΜL (ref 1.8–7.8)
NEUTS SEG NFR BLD AUTO: 48 % (ref 45–65)
PLATELET # BLD AUTO: 231 THOUSANDS/UL (ref 150–450)
PLATELET BLD QL SMEAR: ADEQUATE
PMV BLD AUTO: 8 FL (ref 8.9–12.7)
POTASSIUM SERPL-SCNC: 4.3 MMOL/L (ref 3.6–5)
PROT SERPL-MCNC: 7.1 G/DL (ref 5.9–8.4)
RBC # BLD AUTO: 4.29 MILLION/UL (ref 4–5.2)
RBC MORPH BLD: NORMAL
SODIUM SERPL-SCNC: 140 MMOL/L (ref 137–147)
WBC # BLD AUTO: 6.2 THOUSAND/UL (ref 4.5–11)

## 2020-09-29 PROCEDURE — 85652 RBC SED RATE AUTOMATED: CPT

## 2020-09-29 PROCEDURE — 86140 C-REACTIVE PROTEIN: CPT

## 2020-09-29 PROCEDURE — 83615 LACTATE (LD) (LDH) ENZYME: CPT

## 2020-09-29 PROCEDURE — 85025 COMPLETE CBC W/AUTO DIFF WBC: CPT

## 2020-09-29 PROCEDURE — 80053 COMPREHEN METABOLIC PANEL: CPT

## 2020-09-29 PROCEDURE — 36415 COLL VENOUS BLD VENIPUNCTURE: CPT

## 2020-09-30 ENCOUNTER — TELEPHONE (OUTPATIENT)
Dept: HEMATOLOGY ONCOLOGY | Facility: CLINIC | Age: 50
End: 2020-09-30

## 2020-10-02 ENCOUNTER — OFFICE VISIT (OUTPATIENT)
Dept: HEMATOLOGY ONCOLOGY | Facility: CLINIC | Age: 50
End: 2020-10-02
Payer: COMMERCIAL

## 2020-10-02 VITALS
BODY MASS INDEX: 37 KG/M2 | TEMPERATURE: 97.2 F | HEIGHT: 63 IN | HEART RATE: 96 BPM | SYSTOLIC BLOOD PRESSURE: 140 MMHG | OXYGEN SATURATION: 96 % | DIASTOLIC BLOOD PRESSURE: 84 MMHG | RESPIRATION RATE: 16 BRPM | WEIGHT: 208.8 LBS

## 2020-10-02 DIAGNOSIS — D75.0 POLYCYTHEMIA, FAMILIAL: Primary | ICD-10-CM

## 2020-10-02 DIAGNOSIS — Z83.2 FAMILY HISTORY OF HYPERCOAGULABILITY: ICD-10-CM

## 2020-10-02 DIAGNOSIS — G43.009 MIGRAINE WITHOUT AURA AND WITHOUT STATUS MIGRAINOSUS, NOT INTRACTABLE: ICD-10-CM

## 2020-10-02 PROCEDURE — 99213 OFFICE O/P EST LOW 20 MIN: CPT | Performed by: INTERNAL MEDICINE

## 2020-10-02 RX ORDER — BUTALBITAL, ACETAMINOPHEN AND CAFFEINE 50; 325; 40 MG/1; MG/1; MG/1
TABLET ORAL
Qty: 15 TABLET | Refills: 0 | Status: SHIPPED | OUTPATIENT
Start: 2020-10-02 | End: 2020-11-17 | Stop reason: SDUPTHER

## 2020-11-17 DIAGNOSIS — G43.009 MIGRAINE WITHOUT AURA AND WITHOUT STATUS MIGRAINOSUS, NOT INTRACTABLE: ICD-10-CM

## 2020-11-17 RX ORDER — BUTALBITAL, ACETAMINOPHEN AND CAFFEINE 50; 325; 40 MG/1; MG/1; MG/1
TABLET ORAL
Qty: 15 TABLET | Refills: 0 | Status: SHIPPED | OUTPATIENT
Start: 2020-11-17 | End: 2020-12-31 | Stop reason: SDUPTHER

## 2020-11-21 ENCOUNTER — TRANSCRIBE ORDERS (OUTPATIENT)
Dept: ADMINISTRATIVE | Facility: HOSPITAL | Age: 50
End: 2020-11-21

## 2020-11-21 ENCOUNTER — HOSPITAL ENCOUNTER (OUTPATIENT)
Dept: RADIOLOGY | Facility: HOSPITAL | Age: 50
Discharge: HOME | End: 2020-11-21
Payer: COMMERCIAL

## 2020-11-21 DIAGNOSIS — M54.50 LOW BACK PAIN, UNSPECIFIED BACK PAIN LATERALITY, UNSPECIFIED CHRONICITY, UNSPECIFIED WHETHER SCIATICA PRESENT: Primary | ICD-10-CM

## 2020-11-21 DIAGNOSIS — M25.551 RIGHT HIP PAIN: ICD-10-CM

## 2020-11-21 DIAGNOSIS — M54.50 LOW BACK PAIN, UNSPECIFIED BACK PAIN LATERALITY, UNSPECIFIED CHRONICITY, UNSPECIFIED WHETHER SCIATICA PRESENT: ICD-10-CM

## 2020-11-21 PROCEDURE — 72110 X-RAY EXAM L-2 SPINE 4/>VWS: CPT

## 2020-11-21 PROCEDURE — 73502 X-RAY EXAM HIP UNI 2-3 VIEWS: CPT

## 2020-12-31 DIAGNOSIS — G43.009 MIGRAINE WITHOUT AURA AND WITHOUT STATUS MIGRAINOSUS, NOT INTRACTABLE: ICD-10-CM

## 2020-12-31 RX ORDER — BUTALBITAL, ACETAMINOPHEN AND CAFFEINE 50; 325; 40 MG/1; MG/1; MG/1
TABLET ORAL
Qty: 15 TABLET | Refills: 0 | Status: SHIPPED | OUTPATIENT
Start: 2020-12-31 | End: 2021-02-08 | Stop reason: SDUPTHER

## 2021-02-08 ENCOUNTER — TELEPHONE (OUTPATIENT)
Dept: NEUROLOGY | Facility: CLINIC | Age: 51
End: 2021-02-08

## 2021-02-08 DIAGNOSIS — G43.009 MIGRAINE WITHOUT AURA AND WITHOUT STATUS MIGRAINOSUS, NOT INTRACTABLE: ICD-10-CM

## 2021-02-08 NOTE — TELEPHONE ENCOUNTER
butalbital-acetaminophen-caffeine (FIORICET,ESGIC) -40 mg per tablet     Medication refill check list    Correct patient? yes   Correct medication name, dose, and pill size? yes   Correct provider? yes   Last and Next appt  scheduled? Yes, last date 08/18/20 & next date 02/24/20 scheduled to be rescheduled    Right pharmacy listed? yes   Correct quantity for 30 or 90 days? yes   Is the patient out of refills? When was it last prescribed? Yes, last date 12/31/20   Directions match what the patient says they are taking?  yes   Enough refills? (none for controlled substances, 1 year for routine medications) yes

## 2021-02-09 DIAGNOSIS — G43.009 MIGRAINE WITHOUT AURA AND WITHOUT STATUS MIGRAINOSUS, NOT INTRACTABLE: ICD-10-CM

## 2021-02-09 RX ORDER — BUTALBITAL, ACETAMINOPHEN AND CAFFEINE 50; 325; 40 MG/1; MG/1; MG/1
TABLET ORAL
Qty: 15 TABLET | Refills: 0 | Status: SHIPPED | OUTPATIENT
Start: 2021-02-09 | End: 2021-03-17 | Stop reason: SDUPTHER

## 2021-02-10 RX ORDER — BUTALBITAL, ACETAMINOPHEN AND CAFFEINE 50; 325; 40 MG/1; MG/1; MG/1
TABLET ORAL
Qty: 15 TABLET | Refills: 0 | OUTPATIENT
Start: 2021-02-10

## 2021-02-10 NOTE — TELEPHONE ENCOUNTER
Fioricet denied  Patient advised  Patient would need to try almotriptan, eletriptan, rizatriptan, sumatriptan or zolmatriptan  Patient states that she is allergic to the "triptylines"  I advised that this is an entirely different medication  Patient states she has already tried this, but there is no documentation of previous use of any triptans by patient in medication history on any notes  Patient states that she is going to speak with her insurance company and call us back       [facility], fyi

## 2021-02-12 NOTE — TELEPHONE ENCOUNTER
Patient calling in  She states she got a call from [facility] stating that her fioricet had been approved but when she tried to pick fioricet at the pharmacy, but was told that they have to confirm with provider if it is okay to fill d/t the other medications she is on  She is unsure which medications are causing this  I called [facility] but was told that she just got off the phone with Daniela Mehta RN from our office       She was told to dispense the Fioricet

## 2021-02-12 NOTE — TELEPHONE ENCOUNTER
Patient calling in regarding below  She states she spoke to insurance and was made aware PA was submitted for brand esgic  She is requesting we re-submit for generic butalbital    I also made her aware of [address], but patient requested I still try to submit medication  ID [id number]  BIN [id number]  PCN [id number]  grp [id number]    Attempted to submit on CMM  Key: [id number]  "[facility] was not able to process the request because the previous Prior Authorization Request was Denied, please submit an electronic Appeal Request  You can also contact the plan at [phone] or fax in request to [phone] "     Patient would like a call with a determination     [phone] to leave a detailed message     I called [facility] and spoke to [place]  Butalbital-acetaminophen-caffeine is non-formulary  She would need to have tried below medications before getting Butalbital- acetaminophen-caffeine:  Formulary alternatives: naratriptan, rizatriptan odt, sumatriptan tabs, zolmitriptan odt, almotriptan,     Called and made pt aware of this and that she should us goodrx but she states she has needed prior auth in the past  Last Hanna Olea is from 6/30 encounter    Patient states she tried rizatriptan and sumatriptan inj, nasal spray and sumatriptan  Requesting I try again giving this information  Called [facility] and spoke to [facility] and she states we would need to submit another PA but had to be completed on [facility]  Key:  [id number]  I attempted to submit again, but CMM stated "[facility] was not able to process the request because the previous Prior Authorization Request was Denied, please submit an electronic Appeal Request  You can also contact the plan at [phone] or fax in request to [phone] " again  I made her aware of this  She states that medication is non-formulary   I made her aware that patient has tried sumatriptan tabs, sumatriptan inj, sumatriptan nasal spray, and rizatriptan    We opened new case over the phone  She states that we would have do a coverage determination  PA completed over the phone  She states PA is approved through 12/31/21 but requires to be finalized by a pharmacist  (?) This will take 72 hours for a turn around time       Case# [id number]

## 2021-02-23 ENCOUNTER — TELEPHONE (OUTPATIENT)
Dept: NEUROLOGY | Facility: CLINIC | Age: 51
End: 2021-02-23

## 2021-02-23 NOTE — TELEPHONE ENCOUNTER
Registration completed 2/24/2021 12:30PM [address] at [facility]  Covid-screening questions completed for patient and spouse  Aware of all policies - mask, visitor and no show fee

## 2021-02-24 ENCOUNTER — OFFICE VISIT (OUTPATIENT)
Dept: NEUROLOGY | Facility: CLINIC | Age: 51
End: 2021-02-24
Payer: COMMERCIAL

## 2021-02-24 VITALS
WEIGHT: 197.4 LBS | SYSTOLIC BLOOD PRESSURE: 114 MMHG | BODY MASS INDEX: 34.97 KG/M2 | DIASTOLIC BLOOD PRESSURE: 77 MMHG | HEART RATE: 89 BPM

## 2021-02-24 DIAGNOSIS — G40.909 SEIZURE DISORDER (HCC): ICD-10-CM

## 2021-02-24 DIAGNOSIS — G43.009 MIGRAINE WITHOUT AURA AND WITHOUT STATUS MIGRAINOSUS, NOT INTRACTABLE: Primary | ICD-10-CM

## 2021-02-24 DIAGNOSIS — G47.9 SLEEP DISTURBANCE: ICD-10-CM

## 2021-02-24 PROCEDURE — 99213 OFFICE O/P EST LOW 20 MIN: CPT | Performed by: PHYSICIAN ASSISTANT

## 2021-02-24 RX ORDER — MORPHINE SULFATE 15 MG/1
TABLET, FILM COATED, EXTENDED RELEASE ORAL
COMMUNITY
Start: 2021-02-19

## 2021-02-24 RX ORDER — GABAPENTIN 300 MG/1
CAPSULE ORAL
Qty: 360 CAPSULE | Refills: 2 | Status: SHIPPED | OUTPATIENT
Start: 2021-02-24 | End: 2021-08-24 | Stop reason: SDUPTHER

## 2021-02-24 RX ORDER — OMEPRAZOLE 40 MG/1
40 CAPSULE, DELAYED RELEASE ORAL 2 TIMES DAILY
COMMUNITY
Start: 2020-12-29

## 2021-02-24 NOTE — PROGRESS NOTES
Patient ID: Zehra Browning is a 48 y o  female  Assessment/Plan:   Diagnoses and all orders for this visit:    Migraine without aura and without status migrainosus, not intractable  -     topiramate (TOPAMAX) 200 MG tablet; Take 1 tablet (200 mg total) by mouth 2 (two) times a day  -     gabapentin (NEURONTIN) 300 mg capsule; By mouth take 1 capsule twice daily and 2 capsules at bedtime daily    Seizure disorder (HCC)  -     Topiramate level; Future  -     topiramate (TOPAMAX) 200 MG tablet; Take 1 tablet (200 mg total) by mouth 2 (two) times a day    Sleep disturbance    Other orders  -     morphine (MS CONTIN) 15 mg 12 hr tablet  -     omeprazole (PriLOSEC) 40 MG capsule; Take 40 mg by mouth 2 (two) times a day        She will continue the same medications as she has been taking:  Topamax 200 mg q 12 hours and gabapentin 300/300/600 mg  These are use for both migraine prevention and seizure prevention  She denies any focal or lateralizing deficits since last seen  She denies any seizure-like activity since last seen  Seizure precautions discussed  As discussed the last visit she will continue judicious use the Fioricet p r n  migraine onset  I recommended no more than 3 doses per week to prevent medication overuse headache  At the next visit will monitor CMP, CBC and Topamax level  The patient should not hesitate to call me prior to her follow up with any questions or concerns  Subjective:    HPI    Ms Zehra Browning is a 48 y o  female who continues her ongoing follow-up with Neurology with her migraine as well as seizure diagnosis  She has additional current diagnoses of familial polycythemia, following with Hematology, thought to be related to smoking history  Since last seen her migraine headaches are the same to slightly worse  She is dealing with increased stress as a few members of her family passed away recently, some secondary to [employer] (per ROS below)  She is still mourning    She finds the most relief with Fioricet p r n , however she has had difficulty getting this covered by insurance in the past   In the past she has tried propranolol and Elavil for her migraine headaches but had side effects  She continues Topamax 200 mg q 12 hours for prevention of migraines and seizures  She denies any side effects  Her first seizure was 27years old, grand mal seizure  Her last seizure was about 11 years ago in 2010 per her history  She was on Dilantin, Tegretol in the past   She was seen by a specialist in [place] in the past   She last saw Dr Keyshawn Orozco in August 2020 and everything was noted to be stable at the time, same as today  She denies any new focal or lateralizing deficits  She also takes gabapentin 300 mg capsules 1 twice daily and 2 at bedtime daily  prior notes states that "on this schedule she has only an occasional migraine and is very side despite with the symptomatic response to the judicious use of Fioricet  Expresses no symptoms to suggest any adverse side effects from her medication  She also has a seizure disorder  Her last event occurred on August 30, 2010  Her most recent EEG, done in 2018 was a normal awake, drowsing and sleep study  She continues on her topiramate as noted above for dual purposes  There is also issues with sleep, remark ink that she has early night insomnia, multiple awakenings and reported significant movements in sleep  She has an ambulatory referral to sleep medicine but has not as yet made that appointment "    The following portions of the patient's history were reviewed and updated as appropriate:   She  has a past medical history of Diverticulosis, Hiatal hernia, Migraine, Migraines, Osteopenia, Peptic ulcer, Polycythemia, PVCs (premature ventricular contractions), and [facility] )    She   Patient Active Problem List    Diagnosis Date Noted    Sleep disturbance 03/12/2020    Polycythemia, familial 11/19/2018    Family history of hypercoagulability 11/19/2018    Migraine without aura and without status migrainosus, not intractable 11/01/2018    Seizure disorder ([address] ) 11/01/2018    Near syncope 11/01/2018     She  has a past surgical history that includes Shoulder surgery (Right); Neck surgery (Right); IR biopsy bone marrow (2/26/2019); Bone marrow biopsy w/ aspiration (03/2019); Appendectomy; Colonoscopy (2011); and Hysterectomy  Her family history includes Breast cancer in her paternal aunt; Breast cancer (age of onset: 39) in her cousin; Breast cancer (age of onset: 55) in her cousin; Breast cancer (age of onset: 46) in her cousin; Clotting disorder in her brother; Hypertension in her family and father; Migraines in her family; Ovarian cancer (age of onset: 72) in her maternal grandmother  She  reports that she has been smoking  She has been smoking about 0 50 packs per day  She has never used smokeless tobacco  She reports that she does not drink alcohol or use drugs  Current Outpatient Medications   Medication Sig Dispense Refill    aspirin (ECOTRIN LOW STRENGTH) 81 mg EC tablet Take 81 mg by mouth      butalbital-acetaminophen-caffeine (FIORICET,ESGIC) -40 mg per tablet By mouth take 1 tablet for migraine  May repeat in 4 hours if needed  Limit 2 tablets per 24 hours  30 day supply   15 tablet 0    Calcium Carbonate-Vitamin D3 600-400 MG-UNIT TABS Take 1 tablet by mouth 2 (two) times a day       gabapentin (NEURONTIN) 300 mg capsule By mouth take 1 capsule twice daily and 2 capsules at bedtime daily 360 capsule 2    HYDROcodone-acetaminophen (NORCO) 7 5-325 mg per tablet   0    morphine (MS CONTIN) 15 mg 12 hr tablet       omeprazole (PriLOSEC) 40 MG capsule Take 40 mg by mouth 2 (two) times a day      tiZANidine (ZANAFLEX) 4 mg tablet Take 4 mg by mouth daily at bedtime as needed      topiramate (TOPAMAX) 200 MG tablet Take 1 tablet (200 mg total) by mouth 2 (two) times a day 180 tablet 2    HYDROcodone Bitartrate ER (ZOHYDRO ER) 30 MG CP12 Take by mouth 2 (two) times a day       LORazepam (ATIVAN) 0 5 mg tablet 0 5 mg 2 (two) times a day as needed  1    omeprazole (PRILOSEC) 20 mg delayed release capsule Take 40 mg by mouth daily        No current facility-administered medications for this visit  She is allergic to amitriptyline and propranolol            Objective:    Blood pressure 114/77, pulse 89, weight 89 5 kg (197 lb 6 4 oz)  Body mass index is 34 97 kg/m²  Physical Exam    Neurological Exam  On neurologic exam, the patient is alert and oriented to time and place  Speech is fluent and articulate, and the patient follows commands appropriately  Judgment and affect appear normal  Pupils are equally round and reactive to light and extraocular muscles are intact without nystagmus  Face is symmetric, and tongue, uvula, and palate are midline  Hearing is intact  Motor examination reveals intact strength throughout  Normal gait is steady  ROS:    Review of Systems   Constitutional: Negative  Negative for appetite change and fever  HENT: Negative  Negative for hearing loss, tinnitus, trouble swallowing and voice change  Eyes: Positive for pain and visual disturbance (blurry vision )  Negative for photophobia  Respiratory: Negative  Negative for shortness of breath  Cardiovascular: Negative  Negative for palpitations  Gastrointestinal: Negative  Negative for nausea and vomiting  Endocrine: Negative  Negative for cold intolerance  Genitourinary: Negative  Negative for dysuria, frequency and urgency  Musculoskeletal: Positive for neck pain (herniated disc in neck )  Negative for myalgias  Skin: Negative  Negative for rash     Neurological: Positive for seizures (no seizures since 2010), weakness (right leg ), numbness (neck, buttocks into the hip and thigh ) and headaches (more frequent, under a lot of stress,, mother and brother passed away 5 days apart from [address] in December )  Negative for dizziness, tremors, syncope, facial asymmetry, speech difficulty and light-headedness  Hematological: Negative  Does not bruise/bleed easily  Psychiatric/Behavioral: Positive for sleep disturbance  Negative for confusion and hallucinations  The following portions of the patient's history were reviewed and updated as appropriate: allergies, current medications/ medication history, past family history, past medical history, past social history, past surgical history and problem list     Review of systems was reviewed and otherwise unremarkable from a neurological perspective

## 2021-03-09 ENCOUNTER — TELEPHONE (OUTPATIENT)
Dept: HEMATOLOGY ONCOLOGY | Facility: CLINIC | Age: 51
End: 2021-03-09

## 2021-03-09 NOTE — TELEPHONE ENCOUNTER
Patient called back, and rescheduled apt as she didn't complete her labs  Patient rescheduled apt for Monday 04/05/2021 at 2:30 pm w/Leela CHENEY at the [facility] office

## 2021-03-17 DIAGNOSIS — G43.009 MIGRAINE WITHOUT AURA AND WITHOUT STATUS MIGRAINOSUS, NOT INTRACTABLE: ICD-10-CM

## 2021-03-17 RX ORDER — BUTALBITAL, ACETAMINOPHEN AND CAFFEINE 50; 325; 40 MG/1; MG/1; MG/1
TABLET ORAL
Qty: 15 TABLET | Refills: 0 | Status: SHIPPED | OUTPATIENT
Start: 2021-03-17 | End: 2021-05-05 | Stop reason: SDUPTHER

## 2021-03-17 NOTE — TELEPHONE ENCOUNTER
Patient requesting refill of fioricet  Last OV 2/24/21  Last refill 2/9/21  I have queued this up if you are agreeable to fill    TY

## 2021-04-02 ENCOUNTER — TELEPHONE (OUTPATIENT)
Dept: HEMATOLOGY ONCOLOGY | Facility: CLINIC | Age: 51
End: 2021-04-02

## 2021-04-02 NOTE — TELEPHONE ENCOUNTER
Patient has 2:30 pm f/u apt on Monday 04/05/21 w/Leela Dawson at the [facility] office  Called pt to do covid screening and remind pt to complete labs  Patient didn't answer, and couldn't leave a message as mailbox is full

## 2021-05-04 NOTE — TELEPHONE ENCOUNTER
Problem: Patient Care Overview  Goal: Plan of Care Review  Outcome: Outcome(s) achieved  Goal: Individualization and Mutuality  Outcome: Outcome(s) achieved  Goal: Discharge Needs Assessment  Outcome: Outcome(s) achieved  Goal: Interprofessional Rounds/Family Conf  Outcome: Outcome(s) achieved     Problem: Labor (Cervical Ripen, Induct, Augment) (Adult,Obstetrics,Pediatric)  Goal: Signs and Symptoms of Listed Potential Problems Will be Absent, Minimized or Managed (Labor)  Outcome: Outcome(s) achieved     Problem: Skin Injury Risk (Adult)  Goal: Identify Related Risk Factors and Signs and Symptoms  Outcome: Outcome(s) achieved  Goal: Skin Health and Integrity  Outcome: Outcome(s) achieved      Patient called the office, and she is going to keep her apt on Friday w/Dr Marco Tejeda to go over her lab work  04-May-2021

## 2021-05-05 DIAGNOSIS — G43.009 MIGRAINE WITHOUT AURA AND WITHOUT STATUS MIGRAINOSUS, NOT INTRACTABLE: ICD-10-CM

## 2021-05-05 RX ORDER — BUTALBITAL, ACETAMINOPHEN AND CAFFEINE 50; 325; 40 MG/1; MG/1; MG/1
TABLET ORAL
Qty: 15 TABLET | Refills: 0 | Status: SHIPPED | OUTPATIENT
Start: 2021-05-05 | End: 2021-06-17 | Stop reason: SDUPTHER

## 2021-05-05 NOTE — TELEPHONE ENCOUNTER
Patient calling in requesting [facility]     Order pended, please sign if agreeable

## 2021-06-17 DIAGNOSIS — G43.009 MIGRAINE WITHOUT AURA AND WITHOUT STATUS MIGRAINOSUS, NOT INTRACTABLE: ICD-10-CM

## 2021-06-17 RX ORDER — BUTALBITAL, ACETAMINOPHEN AND CAFFEINE 50; 325; 40 MG/1; MG/1; MG/1
TABLET ORAL
Qty: 15 TABLET | Refills: 0 | Status: SHIPPED | OUTPATIENT
Start: 2021-06-17 | End: 2021-08-05 | Stop reason: SDUPTHER

## 2021-07-08 ENCOUNTER — APPOINTMENT (OUTPATIENT)
Dept: LAB | Facility: HOSPITAL | Age: 51
End: 2021-07-08
Attending: INTERNAL MEDICINE
Payer: COMMERCIAL

## 2021-07-08 DIAGNOSIS — F17.200 TOBACCO DEPENDENCE: Primary | ICD-10-CM

## 2021-07-08 DIAGNOSIS — G40.909 SEIZURE DISORDER (HCC): ICD-10-CM

## 2021-07-08 DIAGNOSIS — R00.2 PALPITATIONS: ICD-10-CM

## 2021-07-08 DIAGNOSIS — D75.0 POLYCYTHEMIA, FAMILIAL: ICD-10-CM

## 2021-07-08 DIAGNOSIS — R06.02 SOB (SHORTNESS OF BREATH): ICD-10-CM

## 2021-07-08 LAB
ALBUMIN SERPL BCP-MCNC: 4.7 G/DL (ref 3–5.2)
ALP SERPL-CCNC: 87 U/L (ref 43–122)
ALT SERPL W P-5'-P-CCNC: 21 U/L
ANION GAP SERPL CALCULATED.3IONS-SCNC: 10 MMOL/L (ref 5–14)
AST SERPL W P-5'-P-CCNC: 27 U/L (ref 14–36)
BILIRUB SERPL-MCNC: 0.5 MG/DL
BUN SERPL-MCNC: 12 MG/DL (ref 5–25)
CALCIUM SERPL-MCNC: 10.3 MG/DL (ref 8.4–10.2)
CHLORIDE SERPL-SCNC: 112 MMOL/L (ref 97–108)
CHOLEST SERPL-MCNC: 221 MG/DL
CO2 SERPL-SCNC: 24 MMOL/L (ref 22–30)
CREAT SERPL-MCNC: 0.9 MG/DL (ref 0.6–1.2)
CRP SERPL QL: 7.5 MG/L
ERYTHROCYTE [SEDIMENTATION RATE] IN BLOOD: 14 MM/HOUR (ref 0–29)
GFR SERPL CREATININE-BSD FRML MDRD: 86 ML/MIN/1.73SQ M
GLUCOSE P FAST SERPL-MCNC: 83 MG/DL (ref 70–99)
HDLC SERPL-MCNC: 35 MG/DL
LDH SERPL-CCNC: 513 U/L (ref 313–618)
LDLC SERPL CALC-MCNC: 165 MG/DL
NONHDLC SERPL-MCNC: 186 MG/DL
POTASSIUM SERPL-SCNC: 4.3 MMOL/L (ref 3.6–5)
PROT SERPL-MCNC: 8.2 G/DL (ref 5.9–8.4)
SODIUM SERPL-SCNC: 146 MMOL/L (ref 137–147)
TRIGL SERPL-MCNC: 105 MG/DL

## 2021-07-08 PROCEDURE — 80061 LIPID PANEL: CPT

## 2021-07-08 PROCEDURE — 85652 RBC SED RATE AUTOMATED: CPT

## 2021-07-08 PROCEDURE — 36415 COLL VENOUS BLD VENIPUNCTURE: CPT

## 2021-07-08 PROCEDURE — 86140 C-REACTIVE PROTEIN: CPT

## 2021-07-08 PROCEDURE — 83615 LACTATE (LD) (LDH) ENZYME: CPT

## 2021-07-08 PROCEDURE — 80053 COMPREHEN METABOLIC PANEL: CPT

## 2021-07-08 PROCEDURE — 80201 ASSAY OF TOPIRAMATE: CPT

## 2021-07-13 LAB — TOPIRAMATE SERPL-MCNC: 3.9 UG/ML (ref 2–25)

## 2021-07-14 ENCOUNTER — TELEPHONE (OUTPATIENT)
Dept: HEMATOLOGY ONCOLOGY | Facility: CLINIC | Age: 51
End: 2021-07-14

## 2021-07-14 NOTE — TELEPHONE ENCOUNTER
I spoke with patient in regards to scheduling follow up appointment with Dr Robbie Gee  Informed patient next available appointment with Dr Robbie Gee is 8/2/21 at 8:20am  Patient accepted new appointment date and time

## 2021-07-14 NOTE — TELEPHONE ENCOUNTER
Patient had labs drawn 7/8/21  She states she needs to schedule an appointment to see Dr Diego Bravo    She had to cancel her last appointment because she did not have her labs drawn  Will send to [place] to review labs with Dr Diego Bravo and determine when follow up appointment is needed    Per patient ok to leave a ms

## 2021-08-05 DIAGNOSIS — G43.009 MIGRAINE WITHOUT AURA AND WITHOUT STATUS MIGRAINOSUS, NOT INTRACTABLE: ICD-10-CM

## 2021-08-05 NOTE — TELEPHONE ENCOUNTER
Patient called and left vm requesting refill of Fioricet    Order pended below, please sign if agreeable
[employer]

## 2021-08-06 RX ORDER — BUTALBITAL, ACETAMINOPHEN AND CAFFEINE 50; 325; 40 MG/1; MG/1; MG/1
TABLET ORAL
Qty: 15 TABLET | Refills: 0 | Status: SHIPPED | OUTPATIENT
Start: 2021-08-06 | End: 2021-09-30 | Stop reason: SDUPTHER

## 2021-08-24 ENCOUNTER — TELEPHONE (OUTPATIENT)
Dept: NEUROLOGY | Facility: CLINIC | Age: 51
End: 2021-08-24

## 2021-08-24 ENCOUNTER — TELEMEDICINE (OUTPATIENT)
Dept: NEUROLOGY | Facility: CLINIC | Age: 51
End: 2021-08-24
Payer: COMMERCIAL

## 2021-08-24 VITALS — BODY MASS INDEX: 34.54 KG/M2 | WEIGHT: 195 LBS

## 2021-08-24 DIAGNOSIS — G43.009 MIGRAINE WITHOUT AURA AND WITHOUT STATUS MIGRAINOSUS, NOT INTRACTABLE: Primary | ICD-10-CM

## 2021-08-24 DIAGNOSIS — G40.909 SEIZURE DISORDER (HCC): ICD-10-CM

## 2021-08-24 DIAGNOSIS — D75.0 POLYCYTHEMIA, FAMILIAL: ICD-10-CM

## 2021-08-24 DIAGNOSIS — E78.2 MIXED HYPERLIPIDEMIA: ICD-10-CM

## 2021-08-24 DIAGNOSIS — G43.009 MIGRAINE WITHOUT AURA AND WITHOUT STATUS MIGRAINOSUS, NOT INTRACTABLE: ICD-10-CM

## 2021-08-24 PROCEDURE — 99214 OFFICE O/P EST MOD 30 MIN: CPT | Performed by: PHYSICIAN ASSISTANT

## 2021-08-24 RX ORDER — GABAPENTIN 100 MG/1
CAPSULE ORAL
Qty: 180 CAPSULE | Refills: 2 | Status: SHIPPED | OUTPATIENT
Start: 2021-08-24 | End: 2021-08-24

## 2021-08-24 RX ORDER — GABAPENTIN 100 MG/1
CAPSULE ORAL
Qty: 35 CAPSULE | Refills: 0 | Status: SHIPPED | OUTPATIENT
Start: 2021-08-24 | End: 2022-04-06 | Stop reason: DRUGHIGH

## 2021-08-24 RX ORDER — GABAPENTIN 300 MG/1
CAPSULE ORAL
Qty: 60 CAPSULE | Refills: 2 | Status: SHIPPED | OUTPATIENT
Start: 2021-08-24 | End: 2021-12-02

## 2021-08-24 RX ORDER — ROSUVASTATIN CALCIUM 10 MG/1
10 TABLET, COATED ORAL DAILY
COMMUNITY
Start: 2021-08-10

## 2021-08-24 RX ORDER — KETOROLAC TROMETHAMINE 10 MG/1
10 TABLET, FILM COATED ORAL EVERY 6 HOURS PRN
Qty: 10 TABLET | Refills: 0 | Status: SHIPPED | OUTPATIENT
Start: 2021-08-24 | End: 2021-09-30 | Stop reason: SDUPTHER

## 2021-08-24 NOTE — PROGRESS NOTES
Virtual Regular Visit    Verification of patient location:    Patient is located in the following state in which I hold an active license- [place]      Assessment/Plan:    Problem List Items Addressed This Visit        Cardiovascular and Mediastinum    Migraine without aura and without status migrainosus, not intractable - Primary    Relevant Medications    ketorolac (TORADOL) 10 mg tablet       Nervous and Auditory    Seizure disorder (HCC)       Other    Polycythemia, familial    Mixed hyperlipidemia    Relevant Medications    rosuvastatin (CRESTOR) 10 MG tablet          She had a migraine last night which woke her up from sleep, but otherwise they are stable  She states if she does not catch the migraine in time with Fioricet then it becomes worse  She will add Toradol as a secondary rescue medication if Fioricet does not help  Side effects reviewed  She also uses black coffee p r n  Dameon Glass She understands not to use excessive amount of caffeine to prevent medication overuse headache  We discussed what constitutes excessive  She denies any seizure-like events since last seen  Continue Topamax 200 mg q 12 hours for both migraine and seizure prevention  Since I last saw her she was transitioned from gabapentin to Lyrica by Pain Management to address the back and thigh pain, but Lyrica caused nightmares so she stopped it  She was agreeable to go back on the gabapentin, as it at least addresses some headaches and neck pain  Back pain seems to be better with the PM interventional procedures  For hyperlipidemia she was started on Crestor and should continue this at 10 mg daily, denies side effects  She is now working more on her diet and exercise as well  Also encouraged to continue to hydrate, and add a little more water throughout the day (yuliet being on topamax)       The patient should not hesitate to call me prior to her follow up with any questions or concerns             Reason for visit is   Chief Complaint   Patient presents with    Virtual Regular Visit        Encounter provider Lorin Roberts PA-C    Provider located at [address] [address]      Recent Visits  Date Type Provider Dept   08/24/21 Telephone Larry Search [facility]   08/24/21 Telemedicine Nick Rausch PA-C [facility] [place]   Showing recent visits within past 7 days and meeting all other requirements  Future Appointments  No visits were found meeting these conditions  Showing future appointments within next 150 days and meeting all other requirements       The patient was identified by name and date of birth  Jeramie Decker was informed that this is a telemedicine visit and that the visit is being conducted through [address] [facility] and patient was informed that this is a secure, HIPAA-compliant platform  She agrees to proceed     My office door was closed  No one else was in the room  She acknowledged consent and understanding of privacy and security of the video platform  The patient has agreed to participate and understands they can discontinue the visit at any time  Patient is aware this is a billable service  Nataly Franco is a 46 y o  female who is contacted via telemedicine for neurological follow-up  HPI     The patient states she had to transition this to a virtual visit so she can stay home, she had a very bad migraine last night that woke her up and she is recovering from it  She states she woke up at about 4:00 a m  with a bad headache and drinks some black coffee and helped a little bit  She typically uses Fioricet but if she does not take the Fioricet in time it does not work  She thought that she would not take Fioricet this time because the migraines started overnight while sleeping    She gets an aura/ warning of neck stiffness and slight pain in the forehead with light sensitivity and or eye pain in both eyes, and then she knows to take the Fioricet  She is not sure what triggered this migraine  She is thinking maybe she was in a different or awkward position in bed, and typically her right shoulder is bothering her more lately  Otherwise she cannot think of any trigger  She does continue to have some stressors but overall these stressors are getting better since I last saw her  Migraines are typically 1-2 times per week  She tried many other migraine meds:  -- imitrex- NS, injection and PO  -- maxalt  -- depakote, Dilantin, topamax, Gabapentin- stopped per pain management to switch to lyrica, but lyrica caused vivid dreams  -- sometimes goes in the hot shower, does not like to use use ice    She is working on getting more active and working on her diet, due to slightly high cholesterol  She is now on Crestor for this  [facility] and since epidural in the low back her low back pain has significantly improved  States she had a lumbar MRI at an open MRI center in July  She describes pain in the back and going down the right greater than left thighs      Recent blood work 7/8/2021:    Cholesterol 221, triglycerides 105, HDL 35,    Topamax level 3 9, sed rate and CRP unremarkable, , CMP within normal limits except for chloride of 112 and calcium 10 3        Past Medical History:   Diagnosis Date    Diverticulosis     Hiatal hernia     Migraine     Migraines     Osteopenia     Peptic ulcer     Polycythemia     PVCs (premature ventricular contractions)     Seizure disorder [facility])        Past Surgical History:   Procedure Laterality Date    APPENDECTOMY      "1990's"    BONE MARROW BIOPSY W/ ASPIRATION  03/2019    COLONOSCOPY  2011    HYSTERECTOMY      IR BIOPSY BONE MARROW  2/26/2019    NECK SURGERY Right     resection of a mass from the posterior neck     SHOULDER SURGERY Right     2 surgeries       Current Outpatient Medications   Medication Sig Dispense Refill  aspirin (ECOTRIN LOW STRENGTH) 81 mg EC tablet Take 81 mg by mouth      butalbital-acetaminophen-caffeine (FIORICET,ESGIC) -40 mg per tablet By mouth take 1 tablet for migraine  May repeat in 4 hours if needed  Limit 2 tablets per 24 hours  30 day supply  15 tablet 0    Calcium Carbonate-Vitamin D3 600-400 MG-UNIT TABS Take 1 tablet by mouth 2 (two) times a day       HYDROcodone-acetaminophen (NORCO) 7 5-325 mg per tablet   0    morphine (MS CONTIN) 15 mg 12 hr tablet       omeprazole (PriLOSEC) 40 MG capsule Take 40 mg by mouth 2 (two) times a day      rosuvastatin (CRESTOR) 10 MG tablet Take 10 mg by mouth daily      tiZANidine (ZANAFLEX) 4 mg tablet Take 4 mg by mouth daily at bedtime as needed      topiramate (TOPAMAX) 200 MG tablet Take 1 tablet (200 mg total) by mouth 2 (two) times a day 180 tablet 2    gabapentin (NEURONTIN) 100 mg capsule TAKE 1 CAPSULE ORALLY EVERY 12 HRSX1 WEEK,THEN 2 CAPS EVERY 82YGUJ5 WEEK, then start new bottle of 300 mg caps 35 capsule 0    gabapentin (NEURONTIN) 300 mg capsule After titration with lower dose, start 300 mg q12 hours 60 capsule 2    HYDROcodone Bitartrate ER (ZOHYDRO ER) 30 MG CP12 Take by mouth 2 (two) times a day  (Patient not taking: Reported on 8/24/2021)      ketorolac (TORADOL) 10 mg tablet Take 1 tablet (10 mg total) by mouth every 6 (six) hours as needed (migraine) Max 2-3 per week  10 tablet 0    LORazepam (ATIVAN) 0 5 mg tablet 0 5 mg 2 (two) times a day as needed (Patient not taking: Reported on 8/24/2021)  1    omeprazole (PRILOSEC) 20 mg delayed release capsule Take 40 mg by mouth daily  (Patient not taking: Reported on 8/24/2021)       No current facility-administered medications for this visit  Allergies   Allergen Reactions    Amitriptyline Other (See Comments)     confusion    Propranolol Other (See Comments)     Other reaction(s):  Other (See Comments)  Distension abdominal       Review of Systems   Constitutional: Negative  Negative for appetite change and fever  HENT: Negative  Negative for hearing loss, tinnitus, trouble swallowing and voice change  Eyes: Negative  Negative for photophobia and pain  Respiratory: Negative  Negative for shortness of breath  Cardiovascular: Negative  Negative for palpitations  Gastrointestinal: Negative  Negative for nausea and vomiting  Endocrine: Negative  Negative for cold intolerance  Genitourinary: Negative  Negative for dysuria, frequency and urgency  Musculoskeletal: Negative  Negative for myalgias and neck pain  Skin: Negative  Negative for rash  Neurological: Positive for weakness (thigh area)  Negative for dizziness, tremors, seizures, syncope, facial asymmetry, speech difficulty, light-headedness, numbness and headaches (Twice a week)  Hematological: Negative  Does not bruise/bleed easily  Psychiatric/Behavioral: Negative  Negative for confusion, hallucinations and sleep disturbance  The following portions of the patient's history were reviewed and updated as appropriate: allergies, current medications/ medication history, past family history, past medical history, past social history, past surgical history and problem list     Review of systems was reviewed and otherwise unremarkable from a neurological perspective  Video Exam    Vitals:    08/24/21 1315   Weight: 88 5 kg (195 lb)       Physical Exam   Neurological exam:  On neurologic exam, the patient is alert and oriented to time and place  Speech is fluent and articulate, and the patient follows commands appropriately  Judgment and affect appear normal   Extraocular muscles are intact without nystagmus  Face is symmetric  Motor examination reveals adequate range of motion  I spent 20 minutes directly with the patient during this visit    [address] verbally agrees to participate in [employer]   Pt is aware that [employer] could be limited without vital signs or the ability to perform a full hands-on physical Lazaro Masterson understands she or the provider may request at any time to terminate the video visit and request the patient to seek care or treatment in person

## 2021-09-14 ENCOUNTER — TELEPHONE (OUTPATIENT)
Dept: HEMATOLOGY ONCOLOGY | Facility: CLINIC | Age: 51
End: 2021-09-14

## 2021-09-14 NOTE — TELEPHONE ENCOUNTER
Patient would like to get a call back to go over her lab work from July  Patient could best be reached at [phone]      Patient has been scheduled for a FU with Dr Yesenia Fried on 10/29 at 1:40 pm

## 2021-09-30 DIAGNOSIS — G43.009 MIGRAINE WITHOUT AURA AND WITHOUT STATUS MIGRAINOSUS, NOT INTRACTABLE: ICD-10-CM

## 2021-09-30 RX ORDER — KETOROLAC TROMETHAMINE 10 MG/1
10 TABLET, FILM COATED ORAL EVERY 6 HOURS PRN
Qty: 10 TABLET | Refills: 2 | Status: SHIPPED | OUTPATIENT
Start: 2021-09-30 | End: 2022-01-03 | Stop reason: SDUPTHER

## 2021-09-30 RX ORDER — BUTALBITAL, ACETAMINOPHEN AND CAFFEINE 50; 325; 40 MG/1; MG/1; MG/1
TABLET ORAL
Qty: 15 TABLET | Refills: 2 | Status: SHIPPED | OUTPATIENT
Start: 2021-09-30 | End: 2022-01-03 | Stop reason: SDUPTHER

## 2021-10-08 NOTE — TELEPHONE ENCOUNTER
Returned a call to pt and left a voice message informing pt that labs that were drawn in July were ordered by another Dr and she still needs labs drawn prior to her F/U appt with  [facility] on 10/29/21 and the orders are in the [facility] system so pt can go to any [facility] lab

## 2021-10-11 ENCOUNTER — TELEPHONE (OUTPATIENT)
Dept: HEMATOLOGY ONCOLOGY | Facility: CLINIC | Age: 51
End: 2021-10-11

## 2021-10-11 DIAGNOSIS — G43.009 MIGRAINE WITHOUT AURA AND WITHOUT STATUS MIGRAINOSUS, NOT INTRACTABLE: ICD-10-CM

## 2021-10-11 DIAGNOSIS — G40.909 SEIZURE DISORDER (HCC): ICD-10-CM

## 2021-10-26 ENCOUNTER — APPOINTMENT (OUTPATIENT)
Dept: LAB | Facility: HOSPITAL | Age: 51
End: 2021-10-26
Attending: INTERNAL MEDICINE
Payer: COMMERCIAL

## 2021-10-26 DIAGNOSIS — D75.0 POLYCYTHEMIA, FAMILIAL: ICD-10-CM

## 2021-10-26 LAB
ALBUMIN SERPL BCP-MCNC: 4.3 G/DL (ref 3–5.2)
ALP SERPL-CCNC: 79 U/L (ref 43–122)
ALT SERPL W P-5'-P-CCNC: 24 U/L
ANION GAP SERPL CALCULATED.3IONS-SCNC: 6 MMOL/L (ref 5–14)
ANISOCYTOSIS BLD QL SMEAR: PRESENT
AST SERPL W P-5'-P-CCNC: 34 U/L (ref 14–36)
BASOPHILS # BLD AUTO: 0 THOUSANDS/ΜL (ref 0–0.1)
BASOPHILS NFR BLD AUTO: 1 % (ref 0–1)
BILIRUB SERPL-MCNC: 0.42 MG/DL
BUN SERPL-MCNC: 14 MG/DL (ref 5–25)
CALCIUM SERPL-MCNC: 9.6 MG/DL (ref 8.4–10.2)
CHLORIDE SERPL-SCNC: 110 MMOL/L (ref 97–108)
CO2 SERPL-SCNC: 26 MMOL/L (ref 22–30)
CREAT SERPL-MCNC: 0.8 MG/DL (ref 0.6–1.2)
EOSINOPHIL # BLD AUTO: 0.1 THOUSAND/ΜL (ref 0–0.4)
EOSINOPHIL NFR BLD AUTO: 2 % (ref 0–6)
ERYTHROCYTE [DISTWIDTH] IN BLOOD BY AUTOMATED COUNT: 13.5 %
GFR SERPL CREATININE-BSD FRML MDRD: 99 ML/MIN/1.73SQ M
GLUCOSE SERPL-MCNC: 94 MG/DL (ref 70–99)
HCT VFR BLD AUTO: 47.6 % (ref 36–46)
HGB BLD-MCNC: 16.2 G/DL (ref 12–16)
LG PLATELETS BLD QL SMEAR: PRESENT
LYMPHOCYTES # BLD AUTO: 2.2 THOUSANDS/ΜL (ref 0.5–4)
LYMPHOCYTES NFR BLD AUTO: 40 % (ref 25–45)
MACROCYTES BLD QL AUTO: PRESENT
MCH RBC QN AUTO: 37 PG (ref 26–34)
MCHC RBC AUTO-ENTMCNC: 34 G/DL (ref 31–36)
MCV RBC AUTO: 109 FL (ref 80–100)
MONOCYTES # BLD AUTO: 0.5 THOUSAND/ΜL (ref 0.2–0.9)
MONOCYTES NFR BLD AUTO: 9 % (ref 1–10)
NEUTROPHILS # BLD AUTO: 2.7 THOUSANDS/ΜL (ref 1.8–7.8)
NEUTS SEG NFR BLD AUTO: 49 % (ref 45–65)
PLATELET # BLD AUTO: 221 THOUSANDS/UL (ref 150–450)
PLATELET BLD QL SMEAR: ADEQUATE
PMV BLD AUTO: 7.5 FL (ref 8.9–12.7)
POTASSIUM SERPL-SCNC: 4.3 MMOL/L (ref 3.6–5)
PROT SERPL-MCNC: 7.6 G/DL (ref 5.9–8.4)
RBC # BLD AUTO: 4.37 MILLION/UL (ref 4–5.2)
RBC MORPH BLD: PRESENT
SODIUM SERPL-SCNC: 142 MMOL/L (ref 137–147)
WBC # BLD AUTO: 5.5 THOUSAND/UL (ref 4.5–11)

## 2021-10-26 PROCEDURE — 36415 COLL VENOUS BLD VENIPUNCTURE: CPT

## 2021-10-26 PROCEDURE — 80053 COMPREHEN METABOLIC PANEL: CPT

## 2021-10-26 PROCEDURE — 85025 COMPLETE CBC W/AUTO DIFF WBC: CPT

## 2021-10-29 ENCOUNTER — OFFICE VISIT (OUTPATIENT)
Dept: HEMATOLOGY ONCOLOGY | Facility: CLINIC | Age: 51
End: 2021-10-29
Payer: COMMERCIAL

## 2021-10-29 VITALS
HEIGHT: 63 IN | HEART RATE: 96 BPM | WEIGHT: 209 LBS | DIASTOLIC BLOOD PRESSURE: 86 MMHG | SYSTOLIC BLOOD PRESSURE: 126 MMHG | RESPIRATION RATE: 18 BRPM | OXYGEN SATURATION: 97 % | TEMPERATURE: 96.8 F | BODY MASS INDEX: 37.03 KG/M2

## 2021-10-29 DIAGNOSIS — D75.0 POLYCYTHEMIA, FAMILIAL: Primary | ICD-10-CM

## 2021-10-29 DIAGNOSIS — M79.89 LEFT LEG SWELLING: ICD-10-CM

## 2021-10-29 DIAGNOSIS — Z83.2 FAMILY HISTORY OF HYPERCOAGULABILITY: ICD-10-CM

## 2021-10-29 DIAGNOSIS — D75.89 MACROCYTOSIS: ICD-10-CM

## 2021-10-29 PROCEDURE — 99214 OFFICE O/P EST MOD 30 MIN: CPT | Performed by: INTERNAL MEDICINE

## 2021-11-03 ENCOUNTER — HOSPITAL ENCOUNTER (OUTPATIENT)
Dept: NON INVASIVE DIAGNOSTICS | Facility: HOSPITAL | Age: 51
Discharge: HOME | End: 2021-11-03
Attending: INTERNAL MEDICINE
Payer: COMMERCIAL

## 2021-11-03 DIAGNOSIS — M79.89 LEFT LEG SWELLING: ICD-10-CM

## 2021-11-03 PROCEDURE — 93970 EXTREMITY STUDY: CPT

## 2021-11-04 PROCEDURE — 93970 EXTREMITY STUDY: CPT | Performed by: SURGERY

## 2021-11-05 ENCOUNTER — TELEPHONE (OUTPATIENT)
Dept: SURGICAL ONCOLOGY | Facility: CLINIC | Age: 51
End: 2021-11-05

## 2022-01-03 ENCOUNTER — TELEPHONE (OUTPATIENT)
Dept: NEUROLOGY | Facility: CLINIC | Age: 52
End: 2022-01-03

## 2022-01-03 DIAGNOSIS — G43.009 MIGRAINE WITHOUT AURA AND WITHOUT STATUS MIGRAINOSUS, NOT INTRACTABLE: ICD-10-CM

## 2022-01-03 NOTE — TELEPHONE ENCOUNTER
Patient called and left vm requesting refill of Toradol and Fioricet to [facility] [employer] [address]     Daniels's below, please review and sign if agreeable

## 2022-01-04 RX ORDER — KETOROLAC TROMETHAMINE 10 MG/1
10 TABLET, FILM COATED ORAL EVERY 6 HOURS PRN
Qty: 10 TABLET | Refills: 2 | Status: SHIPPED | OUTPATIENT
Start: 2022-01-04 | End: 2022-03-07

## 2022-01-04 RX ORDER — BUTALBITAL, ACETAMINOPHEN AND CAFFEINE 50; 325; 40 MG/1; MG/1; MG/1
TABLET ORAL
Qty: 15 TABLET | Refills: 2 | Status: SHIPPED | OUTPATIENT
Start: 2022-01-04 | End: 2022-04-05 | Stop reason: SDUPTHER

## 2022-01-04 NOTE — TELEPHONE ENCOUNTER
[facility] [phone] and obtained approval for Fioricet  Approved 1/4/22 through 12/31/22    Called pharmacy and verified approval with them and also notified pt of approval   Aware

## 2022-02-11 ENCOUNTER — TELEPHONE (OUTPATIENT)
Dept: HEMATOLOGY ONCOLOGY | Facility: CLINIC | Age: 52
End: 2022-02-11

## 2022-02-11 NOTE — TELEPHONE ENCOUNTER
Left message that we had to  Change her 3/11 appt to  3/18    I stated if the time or date is not  Good to please call [phone]  To rs

## 2022-02-14 ENCOUNTER — TELEPHONE (OUTPATIENT)
Dept: NEUROLOGY | Facility: CLINIC | Age: 52
End: 2022-02-14

## 2022-03-06 DIAGNOSIS — G43.009 MIGRAINE WITHOUT AURA AND WITHOUT STATUS MIGRAINOSUS, NOT INTRACTABLE: ICD-10-CM

## 2022-03-07 RX ORDER — KETOROLAC TROMETHAMINE 10 MG/1
10 TABLET, FILM COATED ORAL EVERY 6 HOURS PRN
Qty: 10 TABLET | Refills: 2 | Status: SHIPPED | OUTPATIENT
Start: 2022-03-07 | End: 2022-04-05 | Stop reason: SDUPTHER

## 2022-03-14 ENCOUNTER — TELEPHONE (OUTPATIENT)
Dept: HEMATOLOGY ONCOLOGY | Facility: CLINIC | Age: 52
End: 2022-03-14

## 2022-03-14 NOTE — TELEPHONE ENCOUNTER
I spoke with the patient in regards to her lab work that needs to be completed prior to her appointment on 3/18/22  Patient states she will need to reschedule  Informed patient next available appointment is May 13th at 11:30am  Patient states she will need a early morning appointment  I offered the patient May 18th at 8:00am  Patient accepted new appointment date and time  Informed patient to have her blood work completed prior to new appointment

## 2022-05-12 ENCOUNTER — TELEPHONE (OUTPATIENT)
Dept: HEMATOLOGY ONCOLOGY | Facility: CLINIC | Age: 52
End: 2022-05-12

## 2022-05-12 NOTE — TELEPHONE ENCOUNTER
I spoke with the patient in regards to her lab work that needs to be completed prior to her appointment on 5/18/22 at 8:00am  Patient states understanding

## 2022-05-13 DIAGNOSIS — G43.009 MIGRAINE WITHOUT AURA AND WITHOUT STATUS MIGRAINOSUS, NOT INTRACTABLE: ICD-10-CM

## 2022-05-13 RX ORDER — KETOROLAC TROMETHAMINE 10 MG/1
10 TABLET, FILM COATED ORAL EVERY 6 HOURS PRN
Qty: 10 TABLET | Refills: 0 | Status: SHIPPED | OUTPATIENT
Start: 2022-05-13 | End: 2022-06-01 | Stop reason: SDUPTHER

## 2022-05-13 RX ORDER — BUTALBITAL, ACETAMINOPHEN AND CAFFEINE 50; 325; 40 MG/1; MG/1; MG/1
TABLET ORAL
Qty: 15 TABLET | Refills: 0 | Status: SHIPPED | OUTPATIENT
Start: 2022-05-13 | End: 2022-06-01 | Stop reason: SDUPTHER

## 2022-05-13 NOTE — TELEPHONE ENCOUNTER
Patient called and left vm requesting refill of fioricet and toradol     Orders pended below   Please review and sign if agreeable

## 2022-06-06 ENCOUNTER — TELEPHONE (OUTPATIENT)
Dept: HEMATOLOGY ONCOLOGY | Facility: CLINIC | Age: 52
End: 2022-06-06

## 2022-06-06 NOTE — TELEPHONE ENCOUNTER
I spoke with the patient in regards to her lab work that needs to be completed prior to her appointment on 6/9/22 at 11:00am  Patient states understanding

## 2022-06-07 ENCOUNTER — APPOINTMENT (OUTPATIENT)
Dept: LAB | Facility: HOSPITAL | Age: 52
End: 2022-06-07
Attending: INTERNAL MEDICINE
Payer: COMMERCIAL

## 2022-06-07 DIAGNOSIS — D75.89 MACROCYTOSIS: ICD-10-CM

## 2022-06-07 DIAGNOSIS — D75.0 POLYCYTHEMIA, FAMILIAL: ICD-10-CM

## 2022-06-07 LAB
ALBUMIN SERPL BCP-MCNC: 4.4 G/DL (ref 3–5.2)
ALP SERPL-CCNC: 78 U/L (ref 43–122)
ALT SERPL W P-5'-P-CCNC: 28 U/L
ANION GAP SERPL CALCULATED.3IONS-SCNC: 9 MMOL/L (ref 5–14)
AST SERPL W P-5'-P-CCNC: 33 U/L (ref 14–36)
BASOPHILS # BLD AUTO: 0.03 THOUSANDS/ΜL (ref 0–0.1)
BASOPHILS NFR BLD AUTO: 1 % (ref 0–1)
BILIRUB SERPL-MCNC: 0.58 MG/DL
BUN SERPL-MCNC: 10 MG/DL (ref 5–25)
CALCIUM SERPL-MCNC: 9.3 MG/DL (ref 8.4–10.2)
CHLORIDE SERPL-SCNC: 111 MMOL/L (ref 97–108)
CO2 SERPL-SCNC: 24 MMOL/L (ref 22–30)
CREAT SERPL-MCNC: 0.9 MG/DL (ref 0.6–1.2)
CRP SERPL QL: 7 MG/L
EOSINOPHIL # BLD AUTO: 0.14 THOUSAND/ΜL (ref 0–0.61)
EOSINOPHIL NFR BLD AUTO: 2 % (ref 0–6)
ERYTHROCYTE [DISTWIDTH] IN BLOOD BY AUTOMATED COUNT: 12.6 % (ref 11.6–15.1)
ERYTHROCYTE [SEDIMENTATION RATE] IN BLOOD: 9 MM/HOUR (ref 0–29)
GFR SERPL CREATININE-BSD FRML MDRD: 74 ML/MIN/1.73SQ M
GLUCOSE SERPL-MCNC: 81 MG/DL (ref 70–99)
HCT VFR BLD AUTO: 49.6 % (ref 34.8–46.1)
HGB BLD-MCNC: 16.3 G/DL (ref 11.5–15.4)
IMM GRANULOCYTES # BLD AUTO: 0.02 THOUSAND/UL (ref 0–0.2)
IMM GRANULOCYTES NFR BLD AUTO: 0 % (ref 0–2)
LDH SERPL-CCNC: 522 U/L (ref 313–618)
LYMPHOCYTES # BLD AUTO: 2.42 THOUSANDS/ΜL (ref 0.6–4.47)
LYMPHOCYTES NFR BLD AUTO: 37 % (ref 14–44)
MCH RBC QN AUTO: 35.5 PG (ref 26.8–34.3)
MCHC RBC AUTO-ENTMCNC: 32.9 G/DL (ref 31.4–37.4)
MCV RBC AUTO: 108 FL (ref 82–98)
MONOCYTES # BLD AUTO: 0.33 THOUSAND/ΜL (ref 0.17–1.22)
MONOCYTES NFR BLD AUTO: 5 % (ref 4–12)
NEUTROPHILS # BLD AUTO: 3.63 THOUSANDS/ΜL (ref 1.85–7.62)
NEUTS SEG NFR BLD AUTO: 55 % (ref 43–75)
NRBC BLD AUTO-RTO: 0 /100 WBCS
PLATELET # BLD AUTO: 207 THOUSANDS/UL (ref 149–390)
PMV BLD AUTO: 8.9 FL (ref 8.9–12.7)
POTASSIUM SERPL-SCNC: 3.7 MMOL/L (ref 3.6–5)
PROT SERPL-MCNC: 7.8 G/DL (ref 5.9–8.4)
RBC # BLD AUTO: 4.59 MILLION/UL (ref 3.81–5.12)
RETICS # AUTO: ABNORMAL 10*3/UL (ref 14097–95744)
RETICS # CALC: 2.01 % (ref 0.37–1.87)
SODIUM SERPL-SCNC: 144 MMOL/L (ref 137–147)
TSH SERPL DL<=0.05 MIU/L-ACNC: 1.23 UIU/ML (ref 0.45–4.5)
VIT B12 SERPL-MCNC: 986 PG/ML (ref 100–900)
WBC # BLD AUTO: 6.57 THOUSAND/UL (ref 4.31–10.16)

## 2022-06-07 PROCEDURE — 86140 C-REACTIVE PROTEIN: CPT

## 2022-06-07 PROCEDURE — 84443 ASSAY THYROID STIM HORMONE: CPT

## 2022-06-07 PROCEDURE — 82668 ASSAY OF ERYTHROPOIETIN: CPT

## 2022-06-07 PROCEDURE — 85045 AUTOMATED RETICULOCYTE COUNT: CPT

## 2022-06-07 PROCEDURE — 85652 RBC SED RATE AUTOMATED: CPT

## 2022-06-07 PROCEDURE — 85025 COMPLETE CBC W/AUTO DIFF WBC: CPT

## 2022-06-07 PROCEDURE — 83615 LACTATE (LD) (LDH) ENZYME: CPT

## 2022-06-07 PROCEDURE — 36415 COLL VENOUS BLD VENIPUNCTURE: CPT

## 2022-06-07 PROCEDURE — 80053 COMPREHEN METABOLIC PANEL: CPT

## 2022-06-07 PROCEDURE — 82607 VITAMIN B-12: CPT

## 2022-06-08 LAB — EPO SERPL-ACNC: 11.5 MIU/ML (ref 2.6–18.5)

## 2022-06-09 ENCOUNTER — TELEMEDICINE (OUTPATIENT)
Dept: HEMATOLOGY ONCOLOGY | Facility: CLINIC | Age: 52
End: 2022-06-09
Payer: COMMERCIAL

## 2022-06-09 DIAGNOSIS — Z83.2 FAMILY HISTORY OF HYPERCOAGULABILITY: ICD-10-CM

## 2022-06-09 DIAGNOSIS — D75.0 POLYCYTHEMIA, FAMILIAL: Primary | ICD-10-CM

## 2022-06-09 DIAGNOSIS — D75.89 MACROCYTOSIS: ICD-10-CM

## 2022-06-09 PROCEDURE — 99214 OFFICE O/P EST MOD 30 MIN: CPT | Performed by: INTERNAL MEDICINE

## 2022-06-09 NOTE — PROGRESS NOTES
Virtual Regular Visit    Verification of patient location:    Patient is located in the following state in which I hold an active license [place]      Assessment/Plan:  1  Polycythemia, familial  She continues to have elevated hemoglobin and hematocrit which is most likely reactive in nature  She did have extensive workup in the past including a bone marrow biopsy in 2019 which was negative for JAK2 mutation or obvious signs of myeloproliferative disorder  The patient was asked to follow-up with her PCP and pulmonologist regarding her respiratory symptoms and to consider sleep study to rule out sleep apnea  We did also discuss smoking cessation which most likely will improve her hemoglobin/hematocrit elevated level  - CBC and differential; Future  - Comprehensive metabolic panel; Future  - C-reactive protein; Future  - Iron Panel (Includes Ferritin, Iron Sat%, Iron, and TIBC); Future  - Ferritin; Future  - Vitamin B12; Future  - Folate; Future  - LD,Blood; Future  - Magnesium; Future  - Erythropoietin; Future    2  Family history of hypercoagulability  She has no personal history of clotting event  Doppler ultrasound of the lower extremities was negative  3  Macrocytosis  The executive etiology of the macrocytosis is not entirely clear  She does have mild reticulocytosis which may be a contributing factor     - CBC and differential; Future  - Comprehensive metabolic panel; Future  - Vitamin B12; Future  - Folate;  Future      Problem List Items Addressed This Visit        Other    Polycythemia, familial - Primary    Family history of hypercoagulability      Other Visit Diagnoses     Macrocytosis                   Reason for visit is   Chief Complaint   Patient presents with    Virtual Regular Visit        Encounter provider Marino Styles MD    Provider located at [address] [place] [address], [facility]  [place] [address]  [facility] [address]  [facility] [address]  [phone]      Recent Visits  No visits were found meeting these conditions  Showing recent visits within past 7 days and meeting all other requirements  Today's Visits  Date Type Provider Dept   06/09/22 Telemedicine Chelsey Echavarria MD [facility]   Showing today's visits and meeting all other requirements  Future Appointments  No visits were found meeting these conditions  Showing future appointments within next 150 days and meeting all other requirements       The patient was identified by name and date of birth  Darinel Lowe was informed that this is a telemedicine visit and that the visit is being conducted through [address] [place] [address] Now and patient was informed that this is a secure, HIPAA-compliant platform  She agrees to proceed     My office door was closed  No one else was in the room  She acknowledged consent and understanding of privacy and security of the video platform  The patient has agreed to participate and understands they can discontinue the visit at any time  Patient is aware this is a billable service  Subjective  Darinel Lowe is a 46 y o  female   The patient complained about some shortness of breath on exertion and sleep disturbance which could be related to sleep apnea  She had Doppler ultrasound of both legs on 11/03/2021 which was negative for DVT  Recent blood work on 06/07/2022 showed hemoglobin of 16 3 with MCV of 108  White cells and platelets were within normal range  Hematocrit was 49 6%  Creatinine was 0 9 with normal calcium and liver enzymes  Vitamin B12 was normal on 09/08 6  TSH 1 2    C-reactive protein 7 0  Sed rate 9  Erythropoietin was 11 5        HPI   HPI:  She is a 63-year-old female with strong positive family history for clotting disorder   The patient has a history of seizure disorder, arthritis, migraine headaches, history of colon polyps, her last colonoscopy was in 2011  Thereasa Mow family history is positive for polycythemia/erythrocytosis in her mother and maternal aunt  TRI BAIN  [facility] brother  at age 55 due to deep vein thrombosis/pulmonary embolism after a car trip  TRI [employer]AIN  [facility] sister also had a diagnosis of DVT at age 52 after also a car trip  The patient seemed very concerned about her strong family history for hypercoagulability and asked to get extensive workup done for that particular reason   The hypercoagulable workup came back entirely normal with out any hint of prothrombin gene mutation , her factor 5 Leiden studie was normal   She also has a positive history for polycythemia most likely since childhood with positive family history for the same problem as stated above      The patient had extensive workup for her polycythemia including a negative JAK2 mutation study  Anne Marie Vargas also had a bone marrow biopsy on the  which showed mildly hypercellular bone marrow with mild erythroid hyperplasia without significant dysplasia   The cytogenetic studies and flow cytometry came back all normal   The blast count was within normal range      Past Medical History:   Diagnosis Date    Diverticulosis     Hiatal hernia     Migraine     Migraines     Osteopenia     Peptic ulcer     Polycythemia     PVCs (premature ventricular contractions)     Seizure disorder ([facility] [address] )        Past Surgical History:   Procedure Laterality Date    APPENDECTOMY      ""    BONE MARROW BIOPSY W/ ASPIRATION  2019    COLONOSCOPY  2011    HYSTERECTOMY      IR BIOPSY BONE MARROW  2019    NECK SURGERY Right     resection of a mass from the posterior neck     SHOULDER SURGERY Right     2 surgeries       Current Outpatient Medications   Medication Sig Dispense Refill    aspirin (ECOTRIN LOW STRENGTH) 81 mg EC tablet Take 81 mg by mouth      butalbital-acetaminophen-caffeine (FIORICET,ESGIC) -40 mg per tablet TAKE 1 TABLET BY MOUTH FOR MIGRAINE  MAY REPEAT IN 4 HRS IF NEEDED  LIMIT 2TABS/24 HRS  30 DS   15 tablet 3    Calcium Carbonate-Vitamin D3 600-400 MG-UNIT TABS Take 1 tablet by mouth 2 (two) times a day       gabapentin (NEURONTIN) 300 mg capsule TAKE 1 CAPSULE (300 MG) EVERY12 HOURS 180 capsule 2    HYDROcodone Bitartrate ER (ZOHYDRO ER) 30 MG CP12 Take by mouth 2 (two) times a day       HYDROcodone-acetaminophen (NORCO) 7 5-325 mg per tablet   0    ketorolac (TORADOL) 10 mg tablet Take 1 tablet (10 mg total) by mouth every 6 (six) hours as needed (migraine) Max 2-3 per week  10 tablet 3    morphine (MS CONTIN) 15 mg 12 hr tablet       omeprazole (PriLOSEC) 40 MG capsule Take 40 mg by mouth 2 (two) times a day      rosuvastatin (CRESTOR) 10 MG tablet Take 10 mg by mouth daily      tiZANidine (ZANAFLEX) 4 mg tablet Take 4 mg by mouth daily at bedtime as needed      topiramate (TOPAMAX) 200 MG tablet Take 1 tablet (200 mg total) by mouth 2 (two) times a day 180 tablet 2     No current facility-administered medications for this visit  Allergies   Allergen Reactions    Amitriptyline Other (See Comments)     confusion    Propranolol Other (See Comments)     Other reaction(s): Other (See Comments)  Distension abdominal       Review of Systems   Constitutional: Negative for chills and fever  HENT: Negative for ear pain and sore throat  Eyes: Negative for pain and visual disturbance  Respiratory: Positive for cough and shortness of breath  Cardiovascular: Negative for chest pain and palpitations  Gastrointestinal: Negative for abdominal pain and vomiting  Genitourinary: Negative for dysuria and hematuria  Musculoskeletal: Negative for arthralgias and back pain  Skin: Negative for color change and rash  Neurological: Negative for seizures and syncope  Psychiatric/Behavioral: Positive for sleep disturbance  All other systems reviewed and are negative  Video Exam    There were no vitals filed for this visit  Physical Exam  Constitutional:       Appearance: Normal appearance   She is obese    HENT:      Head: Normocephalic and atraumatic  Nose: Nose normal    Eyes:      Extraocular Movements: Extraocular movements intact  Pulmonary:      Effort: Pulmonary effort is normal    Musculoskeletal:         General: Normal range of motion  Cervical back: Normal range of motion and neck supple  Neurological:      Mental Status: She is alert and oriented to person, place, and time  Psychiatric:         Mood and Affect: Mood normal          Behavior: Behavior normal          Thought Content: Thought content normal          Judgment: Judgment normal           I spent 20 minutes directly with the patient during this visit    [address] verbally agrees to participate in [employer]  Pt is aware that [employer] could be limited without vital signs or the ability to perform a full hands-on physical Salmonsedrick Cabraleslaurence understands she or the provider may request at any time to terminate the video visit and request the patient to seek care or treatment in person

## 2022-06-10 ENCOUNTER — TELEPHONE (OUTPATIENT)
Dept: NEUROLOGY | Facility: CLINIC | Age: 52
End: 2022-06-10

## 2022-06-10 DIAGNOSIS — G43.009 MIGRAINE WITHOUT AURA AND WITHOUT STATUS MIGRAINOSUS, NOT INTRACTABLE: ICD-10-CM

## 2022-06-10 RX ORDER — BUTALBITAL, ACETAMINOPHEN AND CAFFEINE 50; 325; 40 MG/1; MG/1; MG/1
TABLET ORAL
Qty: 15 TABLET | Refills: 3 | Status: SHIPPED | OUTPATIENT
Start: 2022-06-10

## 2022-06-10 NOTE — TELEPHONE ENCOUNTER
pt called and states that she thinks that there was an error on ketorolac and fioricet refills    she states that fioricet was due on the 3rd and we put that not due until the 12th   states that the ketoralac would be due around the 12th     [phone] to leave detailed message    called pharm, fioricet last filled 5/13  Please advise

## 2022-06-10 NOTE — TELEPHONE ENCOUNTER
If last filled on 5/13, she should have it refilled a month later  I just filled it now for her        PDMP documentation:  05/13/2022  2   05/13/2022  [id number]    15 00  [address] [facility]   [id number]   Pen ([id number])     [place]   [place]

## 2022-06-23 ENCOUNTER — TELEPHONE (OUTPATIENT)
Dept: NEUROLOGY | Facility: CLINIC | Age: 52
End: 2022-06-23

## 2022-06-23 ENCOUNTER — PATIENT MESSAGE (OUTPATIENT)
Dept: NEUROLOGY | Facility: CLINIC | Age: 52
End: 2022-06-23

## 2022-06-23 DIAGNOSIS — G43.009 MIGRAINE WITHOUT AURA AND WITHOUT STATUS MIGRAINOSUS, NOT INTRACTABLE: Primary | ICD-10-CM

## 2022-06-27 RX ORDER — DEXAMETHASONE 2 MG/1
TABLET ORAL
Qty: 5 TABLET | Refills: 0 | Status: SHIPPED | OUTPATIENT
Start: 2022-06-27

## 2022-06-27 NOTE — TELEPHONE ENCOUNTER
Pt made aware, she is agreeable to trying decadron  Advised her script was sent  She will call back with any further questions or concerns

## 2022-07-13 ENCOUNTER — PATIENT MESSAGE (OUTPATIENT)
Dept: NEUROLOGY | Facility: CLINIC | Age: 52
End: 2022-07-13

## 2022-07-13 DIAGNOSIS — G43.009 MIGRAINE WITHOUT AURA AND WITHOUT STATUS MIGRAINOSUS, NOT INTRACTABLE: Primary | ICD-10-CM

## 2022-07-22 ENCOUNTER — TELEPHONE (OUTPATIENT)
Dept: HEMATOLOGY ONCOLOGY | Facility: CLINIC | Age: 52
End: 2022-07-22

## 2022-07-22 NOTE — TELEPHONE ENCOUNTER
Returned a call to pt and let her know that per Virtual Visit in June Dr acharya suggested pt follow up with her PCP or see a Pulmonologist to set up a sleep Study  I instructed her to return a call to [facility] where text came from to see if they could give her more information        ----- Message from Cass Lee sent at 7/13/2022  8:02 AM EDT -----  Regarding: FW: A dx tesr request or outpatient visit   Please review  ----- Message -----  From: Liz Denise  Sent: 7/13/2022   5:41 AM EDT  To: [facility] [place]  Subject: A dx tesr request or outpatient visit            Good morning:  Did Dr Mark Villar request a Diagnostic test or an outpatient visit for me  I received this following text message on June 28, 2022 and just got a chance to reply  He is the only Dr  I had an appointment with and was concerned about my Oxygen level, & lungs and made some suggestion that I see my Pulmonologist or having a Sleep Study to see if I'm gettinf enough Oxygen  Following text message:  Hello, This is [facility]  Alphonza Jeans, your doctor has requested a diagnostic test or outpatient visit for you  Please call [phone] to schedule, and press 8 immediately after greeting  Please disregard this message If you have already scheduled your test   Thank You!

## 2022-08-26 ENCOUNTER — PATIENT MESSAGE (OUTPATIENT)
Dept: NEUROLOGY | Facility: CLINIC | Age: 52
End: 2022-08-26

## 2022-08-26 ENCOUNTER — TELEPHONE (OUTPATIENT)
Dept: NEUROLOGY | Facility: CLINIC | Age: 52
End: 2022-08-26

## 2022-08-26 NOTE — PATIENT COMMUNICATION
Patient left voicemail asking for clarification of wine Nurtec was prescribed  She also said she was notified by her pharmacy that it needs a prior authorization  Call back [phone]  I clarified Nurtec is prescribed for migraine and to be used for an acute migraine as an abortive  Per communication thread in [address] it is noted that Sarah Santiago asked if she was receptive to trying it and was proactive and sent to the pharmacy for her just in case    Patient is in agreement with trying    Will proceed with prior authorization for Nurtec

## 2022-08-31 NOTE — TELEPHONE ENCOUNTER
bin [id number]  n mebdaet  ID [id number]  [facility] rxaetd  [phone]     Attempted to submit via CMM however not able to send to plan  I called plan and they are faxing form for completion

## 2022-08-31 NOTE — TELEPHONE ENCOUNTER
Faxed completed form to:  [phone], phone [phone]; determination pending (copy scanned to media)

## 2022-09-02 NOTE — TELEPHONE ENCOUNTER
Recd denial letter  I called insurance as updated information was faxed 8/31  Approval received: 1/1/2022 - 12/31/2022  Approval # [id number]    Patient aware

## 2022-09-21 ENCOUNTER — OFFICE VISIT (OUTPATIENT)
Dept: NEUROLOGY | Facility: CLINIC | Age: 52
End: 2022-09-21

## 2022-09-21 VITALS
SYSTOLIC BLOOD PRESSURE: 131 MMHG | DIASTOLIC BLOOD PRESSURE: 82 MMHG | HEART RATE: 80 BPM | TEMPERATURE: 96.4 F | HEIGHT: 63 IN | WEIGHT: 203.7 LBS | BODY MASS INDEX: 36.09 KG/M2

## 2022-09-21 DIAGNOSIS — M79.18 MYOFASCIAL MUSCLE PAIN: Primary | ICD-10-CM

## 2022-09-21 DIAGNOSIS — G47.9 SLEEP DISTURBANCE: ICD-10-CM

## 2022-09-21 DIAGNOSIS — M54.81 OCCIPITAL NEURALGIA OF RIGHT SIDE: ICD-10-CM

## 2022-09-21 DIAGNOSIS — D75.0 POLYCYTHEMIA, FAMILIAL: ICD-10-CM

## 2022-09-21 DIAGNOSIS — G43.009 MIGRAINE WITHOUT AURA AND WITHOUT STATUS MIGRAINOSUS, NOT INTRACTABLE: ICD-10-CM

## 2022-09-21 DIAGNOSIS — G40.909 SEIZURE DISORDER (HCC): ICD-10-CM

## 2022-09-21 RX ORDER — GABAPENTIN 300 MG/1
CAPSULE ORAL
Qty: 180 CAPSULE | Refills: 2 | Status: SHIPPED | OUTPATIENT
Start: 2022-09-21

## 2022-09-21 RX ORDER — GABAPENTIN 300 MG/1
CAPSULE ORAL
Qty: 180 CAPSULE | Refills: 2 | Status: SHIPPED | OUTPATIENT
Start: 2022-09-21 | End: 2022-09-21

## 2022-09-21 RX ORDER — BACLOFEN 10 MG/1
TABLET ORAL
Qty: 90 TABLET | Refills: 0 | Status: SHIPPED | OUTPATIENT
Start: 2022-09-21 | End: 2022-09-21

## 2022-09-21 RX ORDER — BACLOFEN 10 MG/1
TABLET ORAL
Qty: 90 TABLET | Refills: 0 | Status: SHIPPED | OUTPATIENT
Start: 2022-09-21

## 2022-09-21 RX ORDER — LIDOCAINE HYDROCHLORIDE 10 MG/ML
2 INJECTION, SOLUTION INFILTRATION; PERINEURAL ONCE
Status: COMPLETED | OUTPATIENT
Start: 2022-09-21 | End: 2022-09-21

## 2022-09-21 RX ORDER — BACLOFEN 10 MG/1
TABLET ORAL
Qty: 90 TABLET | Refills: 0 | Status: SHIPPED | OUTPATIENT
Start: 2022-09-21 | End: 2022-09-21 | Stop reason: SDUPTHER

## 2022-09-21 RX ADMIN — LIDOCAINE HYDROCHLORIDE 2 ML: 10 INJECTION, SOLUTION INFILTRATION; PERINEURAL at 10:58

## 2022-09-21 NOTE — PROGRESS NOTES
Patient ID: Marylou Rodriguez is a 46 y o  female  Assessment/Plan:     Diagnoses and all orders for this visit:    Myofascial muscle pain  -     lidocaine (XYLOCAINE) 1 % injection 2 mL  -     Trigger Injection 1 or 2 muscles    Polycythemia, familial  -     Home Study; Future    Sleep disturbance  -     Home Study; Future    Occipital neuralgia of right side  -     Discontinue: baclofen 10 mg tablet; 1 tab qhs prn neck pain or headache  Hold tizanidine   -     Discontinue: baclofen 10 mg tablet; 1 tab qhs prn neck pain or headache  Hold tizanidine   -     baclofen 10 mg tablet; 1 tab qhs prn neck pain or headache  Hold tizanidine  Migraine without aura and without status migrainosus, not intractable  -     Discontinue: gabapentin (NEURONTIN) 300 mg capsule; TAKE 1 CAPSULE (300 MG) EVERY12 HOURS  -     Discontinue: topiramate (TOPAMAX) 200 MG tablet; Take 1 tablet (200 mg total) by mouth 2 (two) times a day  -     topiramate (TOPAMAX) 200 MG tablet; Take 1 tablet (200 mg total) by mouth 2 (two) times a day  -     gabapentin (NEURONTIN) 300 mg capsule; TAKE 1 CAPSULE (300 MG) EVERY12 HOURS    Seizure disorder (HCC)  -     Discontinue: topiramate (TOPAMAX) 200 MG tablet; Take 1 tablet (200 mg total) by mouth 2 (two) times a day  -     topiramate (TOPAMAX) 200 MG tablet; Take 1 tablet (200 mg total) by mouth 2 (two) times a day         TPIs were performed on an emergent basis for myofascial pain and headache as noted below  She can contact me if needed again  In the meantime, can also trial baclofen prn headache/ neck pain  S/e reviewed  Sleep study recommended  For migraine prevention, continue 300 mg gabapentin BID, and topamax 200 mg BID, which continues to work well  Pt was following Dr Audra Krishnamurthy in the past regarding migraines and seizures  Both are managed well at this time  No seizure like events  Can try nurtec again, if she experiences similar s/e as before she can d/c indefinitely      The patient should not hesitate to call me prior to her follow up with any questions or concerns  Subjective:    HPI     Ms Kathleen Garcia is a very pleasant 45 yo female here for f/u  Migraines in a month would be 2-3 a month, headaches would be 1-2 a week  Nurtec did help but pt noticed a rash on the face/ neck after taking it so she stopped and the rash had gone away  Unsure if she should continue taking it or not  On questioning she states she is not sure if the rash was from the nurtec  She did not have any itching, pain or swelling with the rash  Denies SOB, CP, dysphagia, or any other allergic like sxs  She reports a new type of headache which is a sharp pain with laying on the back of the head; pain is the R temporal occipital region, 10/10 only when laying on it  States the headache is debilitating, has occurred every night for 2 weeks now and then stopped spontaneously when she slept on her left  States the headache woke her up in the middle of the night at times  Also would describe it as a piercing or tazer feeling  States in 2017 she had lymph nodes removed from the R side of the lateral neck, and feels this triggers the neck pain and headaches since that procedure  She also has a seizure disorder  Her last event occurred on August 30, 2010  Her most recent EEG, done in 2018 was a normal awake, drowsing and sleep study  She continues on her topiramate as noted above for dual purposes  The following portions of the patient's history were reviewed and updated as appropriate:   She  has a past medical history of Diverticulosis, Hiatal hernia, Migraine, Migraines, Osteopenia, Peptic ulcer, Polycythemia, PVCs (premature ventricular contractions), and Seizure disorder ([facility] [address] )    She   Patient Active Problem List    Diagnosis Date Noted   • Occipital neuralgia of right side 10/16/2022   • Myofascial muscle pain 10/16/2022   • Macrocytosis 06/09/2022   • Mixed hyperlipidemia 08/24/2021   • Sleep disturbance 03/12/2020   • Polycythemia, familial 11/19/2018   • Family history of hypercoagulability 11/19/2018   • Migraine without aura and without status migrainosus, not intractable 11/01/2018   • Seizure disorder ([address] ) 11/01/2018   • Near syncope 11/01/2018     She  has a past surgical history that includes Shoulder surgery (Right); Neck surgery (Right); IR biopsy bone marrow (2/26/2019); Bone marrow biopsy w/ aspiration (03/2019); Appendectomy; Colonoscopy (2011); and Hysterectomy  Her family history includes Breast cancer in her paternal aunt; Breast cancer (age of onset: 39) in her cousin; Breast cancer (age of onset: 55) in her cousin; Breast cancer (age of onset: 46) in her cousin; Clotting disorder in her brother; Hypertension in her family and father; Migraines in her family; Ovarian cancer (age of onset: 72) in her maternal grandmother  She  reports that she has been smoking  She has been smoking about 0 50 packs per day  She has never used smokeless tobacco  She reports that she does not drink alcohol and does not use drugs  Current Outpatient Medications   Medication Sig Dispense Refill   • aspirin (ECOTRIN LOW STRENGTH) 81 mg EC tablet Take 81 mg by mouth     • baclofen 10 mg tablet 1 tab qhs prn neck pain or headache  Hold tizanidine  90 tablet 0   • butalbital-acetaminophen-caffeine (FIORICET,ESGIC) -40 mg per tablet TAKE 1 TABLET BY MOUTH FOR MIGRAINE  MAY REPEAT IN 4 HRS IF NEEDED  LIMIT 2TABS/24 HRS  30 DS  15 tablet 3   • Calcium Carbonate-Vitamin D3 600-400 MG-UNIT TABS Take 1 tablet by mouth 2 (two) times a day      • gabapentin (NEURONTIN) 300 mg capsule TAKE 1 CAPSULE (300 MG) EVERY12 HOURS 180 capsule 2   • HYDROcodone-acetaminophen (NORCO) 7 5-325 mg per tablet   0   • ketorolac (TORADOL) 10 mg tablet Take 1 tablet (10 mg total) by mouth every 6 (six) hours as needed (migraine) Max 2-3 per week   10 tablet 3   • morphine (MS CONTIN) 15 mg 12 hr tablet      • omeprazole (PriLOSEC) 40 MG capsule Take 40 mg by mouth 2 (two) times a day     • rosuvastatin (CRESTOR) 10 MG tablet Take 10 mg by mouth daily     • tiZANidine (ZANAFLEX) 4 mg tablet Take 4 mg by mouth daily at bedtime as needed     • topiramate (TOPAMAX) 200 MG tablet Take 1 tablet (200 mg total) by mouth 2 (two) times a day 180 tablet 2   • dexamethasone (DECADRON) 2 mg tablet 1 tab qam with food prn migraine  5 tablet 0   • HYDROcodone Bitartrate ER (ZOHYDRO ER) 30 MG CP12 Take by mouth 2 (two) times a day      • Rimegepant Sulfate (NURTEC) 75 MG TBDP 1 tab at migraine onset  No more than one dose per 24 hours  Hold triptan  (Patient not taking: Reported on 9/21/2022) 8 tablet 0     No current facility-administered medications for this visit  She is allergic to amitriptyline and propranolol            Objective:    Blood pressure 131/82, pulse 80, temperature (!) 96 4 °F (35 8 °C), temperature source Temporal, height 5' 3" (1 6 m), weight 92 4 kg (203 lb 11 2 oz)  Body mass index is 36 08 kg/m²  Physical Exam    Neurological Exam  On neurologic exam, the patient is alert and oriented to time and place  Speech is fluent and articulate, and the patient follows commands appropriately  Judgment and affect appear normal  Pupils are equally round and reactive to light and extraocular muscles are intact without nystagmus  Face is symmetric, and tongue, uvula, and palate are midline  Hearing is intact  Motor examination reveals intact strength throughout  Normal gait is steady  ROS:    Review of Systems   Constitutional: Negative  Negative for appetite change and fever  HENT: Negative  Negative for hearing loss, tinnitus, trouble swallowing and voice change  Eyes: Negative  Negative for photophobia and pain  Respiratory: Negative  Negative for shortness of breath  Cardiovascular: Negative  Negative for palpitations  Gastrointestinal: Negative  Negative for nausea and vomiting  Endocrine: Negative    Negative for cold intolerance  Genitourinary: Negative  Negative for dysuria, frequency and urgency  Musculoskeletal: Negative  Negative for myalgias and neck pain  Skin: Negative  Negative for rash  Neurological: Positive for headaches  Negative for dizziness, tremors, seizures, syncope, facial asymmetry, speech difficulty, weakness, light-headedness and numbness  Hematological: Negative  Does not bruise/bleed easily  Psychiatric/Behavioral: Negative  Negative for confusion, hallucinations and sleep disturbance  The following portions of the patient's history were reviewed and updated as appropriate: allergies, current medications/ medication history, past family history, past medical history, past social history, past surgical history and problem list     Review of systems was reviewed and otherwise unremarkable from a neurological perspective  Universal Protocol:  Consent: The procedure was performed in an emergent situation  Verbal consent obtained  Risks and benefits: risks, benefits and alternatives were discussed  Consent given by: patient    Procedure Details  Location(s):  Patient tolerance: patient tolerated the procedure well with no immediate complications  Additional procedure details: Trigger point injections were performed on an emergent basis and are a part of ongoing therapy  Risks, benefits, alternatives, infection, bleeding and allergic reaction were discussed with the patient  Verbal consent was obtained prior to the procedure and is detailed in the patient's record  Trigger point injections were performed in the following locations using a total 2 mL of 1% lidocaine, without steroid, using a 30 gauge, 1/2 inch needle: R upper traps, R occipitalis muscle (posterior and lateral/ region of lesser ON)

## 2022-09-22 ENCOUNTER — PATIENT MESSAGE (OUTPATIENT)
Dept: NEUROLOGY | Facility: CLINIC | Age: 52
End: 2022-09-22

## 2022-09-22 DIAGNOSIS — G43.009 MIGRAINE WITHOUT AURA AND WITHOUT STATUS MIGRAINOSUS, NOT INTRACTABLE: ICD-10-CM

## 2022-09-23 RX ORDER — BUTALBITAL, ACETAMINOPHEN AND CAFFEINE 50; 325; 40 MG/1; MG/1; MG/1
TABLET ORAL
Qty: 15 TABLET | Refills: 3 | Status: SHIPPED | OUTPATIENT
Start: 2022-09-23

## 2022-09-23 RX ORDER — KETOROLAC TROMETHAMINE 10 MG/1
10 TABLET, FILM COATED ORAL EVERY 6 HOURS PRN
Qty: 10 TABLET | Refills: 3 | Status: SHIPPED | OUTPATIENT
Start: 2022-09-23

## 2022-10-16 PROBLEM — M54.81 OCCIPITAL NEURALGIA OF RIGHT SIDE: Status: ACTIVE | Noted: 2022-10-16

## 2022-10-16 PROBLEM — M79.18 MYOFASCIAL MUSCLE PAIN: Status: ACTIVE | Noted: 2022-10-16

## 2022-10-27 ENCOUNTER — TELEPHONE (OUTPATIENT)
Dept: HEMATOLOGY ONCOLOGY | Facility: CLINIC | Age: 52
End: 2022-10-27

## 2022-10-27 NOTE — TELEPHONE ENCOUNTER
Spoke with pt to let her know I need to reschedule her appt  And she was fine with the new date and time

## 2022-12-02 ENCOUNTER — TELEPHONE (OUTPATIENT)
Dept: HEMATOLOGY ONCOLOGY | Facility: CLINIC | Age: 52
End: 2022-12-02

## 2022-12-02 NOTE — TELEPHONE ENCOUNTER
Called and spoke with the patient  Informed her that we needed to reschedule her for a chemo treatment patient  She said the date I gave her (Dec 9th @ 10:20) she couldn't do  Patient said to put her on the next appointment available and she would look at her schedule when she gets back into town  Rescheduled her appointment for Jan 16 @ 8:40

## 2023-01-03 ENCOUNTER — TELEPHONE (OUTPATIENT)
Dept: NEUROLOGY | Facility: CLINIC | Age: 53
End: 2023-01-03

## 2023-01-03 NOTE — TELEPHONE ENCOUNTER
Called patient to see which medication she is referring to    Reached vm, left message tcb with further information in order to assist

## 2023-01-03 NOTE — TELEPHONE ENCOUNTER
Patient called and stated that she is waiting on the clinical staff to send a prior authorization for her in order for her to be able to purchase her medication at a cheaper cost     Please call patient back at [phone] or [phone]

## 2023-01-06 NOTE — TELEPHONE ENCOUNTER
Spoke to patient  She advised ketorolac required PA  ID: [id number]  GROUP: [id number]  PCN: [id number]  [id number]: [id number]    Key: [id number] - error message received stating patient has access to medication, no PA needed  Notified patient  Patient requested we verify butalbital is still covered  Per chart review, last PA  in December  Attempted PA for butalbital  Key: [id number]  Error message also received stating patient currently has access to medication, no PA needed

## 2023-01-12 ENCOUNTER — TELEPHONE (OUTPATIENT)
Dept: HEMATOLOGY ONCOLOGY | Facility: CLINIC | Age: 53
End: 2023-01-12

## 2023-01-12 NOTE — TELEPHONE ENCOUNTER
Lvm to the patient in regards to her lab work that needs to be completed prior to her appt on 1/16/23 at 8:20am

## 2023-01-25 ENCOUNTER — TELEPHONE (OUTPATIENT)
Dept: SLEEP CENTER | Facility: CLINIC | Age: 53
End: 2023-01-25

## 2023-01-25 NOTE — TELEPHONE ENCOUNTER
----- Message from Fausto Mello MD sent at 1/25/2023 10:36 AM EST -----  approved  ----- Message -----  From: Leighann Callahan  Sent: 2/89/8840   7:42 AM EST  To: Sleep Medicine [facility] Provider    This DIAGNOSTIC sleep study needs approval      If approved please sign and return to clerical pool  If denied please include reasons why  Also provide alternative testing if warranted  Please sign and return to clerical pool

## 2023-02-01 DIAGNOSIS — G43.009 MIGRAINE WITHOUT AURA AND WITHOUT STATUS MIGRAINOSUS, NOT INTRACTABLE: ICD-10-CM

## 2023-02-01 RX ORDER — KETOROLAC TROMETHAMINE 10 MG/1
10 TABLET, FILM COATED ORAL EVERY 6 HOURS PRN
Qty: 10 TABLET | Refills: 3 | Status: SHIPPED | OUTPATIENT
Start: 2023-02-01

## 2023-02-01 RX ORDER — BUTALBITAL, ACETAMINOPHEN AND CAFFEINE 50; 325; 40 MG/1; MG/1; MG/1
TABLET ORAL
Qty: 15 TABLET | Refills: 3 | Status: SHIPPED | OUTPATIENT
Start: 2023-02-01

## 2023-02-03 ENCOUNTER — TELEPHONE (OUTPATIENT)
Dept: NEUROLOGY | Facility: CLINIC | Age: 53
End: 2023-02-03

## 2023-02-03 NOTE — TELEPHONE ENCOUNTER
Spoke to patient; she is aware scripts already sent  She is reporting symptoms as noted in her my chart message on right side however she said she was recently diagnosed with COVID and thinks that is contributing  F/u is scheduled 3/21 and she said she was hoping to get injection at that visit also

## 2023-02-03 NOTE — TELEPHONE ENCOUNTER
----- Message from Brianne Garcia RN sent at 2/2/2023  8:08 AM EST -----  Regarding: FW: Sleep study - Medication refill  Contact: [phone]    ----- Message -----  From: Ryan Thomas  Sent: 2/1/2023  10:16 AM EST  To: Neurology [address] [id number], #  Subject: FW: Sleep study - Medication refill              Medication refill request per patient  Please see thank you from patient message  ----- Message -----  From: Roger Olivia  Sent: 2/1/2023   9:26 AM EST  To: Neurology [facility] Clinical  Subject: Sleep study                                      Thank you for scheduling hospital sleep study as you did explain at my visit those are much better  I also need refill if possible on Butalbital -40 & ketorilac 10 mg for my migraine headaches called into [address] [place] [address] [phone]  I have also been experiencing flare ups of the pins & needles on rt side of head x3 weeks (Perm Occipital Nerve Damage) that makes the Migraines worse  I'm not sure when I'm schedled for next dollow up, but I would like the injection you gave me in that area again on my next visit if possible       Thank you,   Kenzie Estrada

## 2023-02-20 ENCOUNTER — TELEPHONE (OUTPATIENT)
Dept: HEMATOLOGY ONCOLOGY | Facility: CLINIC | Age: 53
End: 2023-02-20

## 2023-02-20 ENCOUNTER — PATIENT MESSAGE (OUTPATIENT)
Dept: HEMATOLOGY ONCOLOGY | Facility: CLINIC | Age: 53
End: 2023-02-20

## 2023-02-20 NOTE — TELEPHONE ENCOUNTER
Left message for patient to have labs completed prior to appointment with Alejandrina Hoover on 2/22/23

## 2023-03-02 NOTE — TELEPHONE ENCOUNTER
Spoke with patient, was able to get her scheduled with Dr Sofia Hudson at our [facility] location on 3/31/23 at [address] [place] [address]  Patient advised address and phone number will show up on her "[employer]" account  Patient voiced understanding

## 2023-03-18 ENCOUNTER — APPOINTMENT (OUTPATIENT)
Dept: LAB | Facility: HOSPITAL | Age: 53
End: 2023-03-18

## 2023-03-18 DIAGNOSIS — D75.89 MACROCYTOSIS: ICD-10-CM

## 2023-03-18 DIAGNOSIS — D75.0 POLYCYTHEMIA, FAMILIAL: ICD-10-CM

## 2023-03-18 LAB
ALBUMIN SERPL BCP-MCNC: 4 G/DL (ref 3.5–5)
ALP SERPL-CCNC: 69 U/L (ref 34–104)
ALT SERPL W P-5'-P-CCNC: 23 U/L (ref 7–52)
ANION GAP SERPL CALCULATED.3IONS-SCNC: 7 MMOL/L (ref 4–13)
AST SERPL W P-5'-P-CCNC: 23 U/L (ref 13–39)
BASOPHILS # BLD AUTO: 0.04 THOUSANDS/ÂΜL (ref 0–0.1)
BASOPHILS NFR BLD AUTO: 1 % (ref 0–1)
BILIRUB SERPL-MCNC: 0.26 MG/DL (ref 0.2–1)
BUN SERPL-MCNC: 16 MG/DL (ref 5–25)
CALCIUM SERPL-MCNC: 9.7 MG/DL (ref 8.4–10.2)
CHLORIDE SERPL-SCNC: 109 MMOL/L (ref 96–108)
CO2 SERPL-SCNC: 26 MMOL/L (ref 21–32)
CREAT SERPL-MCNC: 0.76 MG/DL (ref 0.6–1.3)
CRP SERPL QL: 9.2 MG/L
EOSINOPHIL # BLD AUTO: 0.13 THOUSAND/ÂΜL (ref 0–0.61)
EOSINOPHIL NFR BLD AUTO: 2 % (ref 0–6)
ERYTHROCYTE [DISTWIDTH] IN BLOOD BY AUTOMATED COUNT: 12.8 % (ref 11.6–15.1)
FERRITIN SERPL-MCNC: 29 NG/ML (ref 8–388)
FOLATE SERPL-MCNC: 6.1 NG/ML (ref 3.1–17.5)
GFR SERPL CREATININE-BSD FRML MDRD: 90 ML/MIN/1.73SQ M
GLUCOSE P FAST SERPL-MCNC: 77 MG/DL (ref 65–99)
HCT VFR BLD AUTO: 47.1 % (ref 34.8–46.1)
HGB BLD-MCNC: 15.5 G/DL (ref 11.5–15.4)
IMM GRANULOCYTES # BLD AUTO: 0.02 THOUSAND/UL (ref 0–0.2)
IMM GRANULOCYTES NFR BLD AUTO: 0 % (ref 0–2)
LDH SERPL-CCNC: 211 U/L (ref 140–271)
LYMPHOCYTES # BLD AUTO: 2.39 THOUSANDS/ÂΜL (ref 0.6–4.47)
LYMPHOCYTES NFR BLD AUTO: 35 % (ref 14–44)
MAGNESIUM SERPL-MCNC: 2 MG/DL (ref 1.9–2.7)
MCH RBC QN AUTO: 36.3 PG (ref 26.8–34.3)
MCHC RBC AUTO-ENTMCNC: 32.9 G/DL (ref 31.4–37.4)
MCV RBC AUTO: 110 FL (ref 82–98)
MONOCYTES # BLD AUTO: 0.67 THOUSAND/ÂΜL (ref 0.17–1.22)
MONOCYTES NFR BLD AUTO: 10 % (ref 4–12)
NEUTROPHILS # BLD AUTO: 3.61 THOUSANDS/ÂΜL (ref 1.85–7.62)
NEUTS SEG NFR BLD AUTO: 52 % (ref 43–75)
NRBC BLD AUTO-RTO: 0 /100 WBCS
PLATELET # BLD AUTO: 236 THOUSANDS/UL (ref 149–390)
PMV BLD AUTO: 9.4 FL (ref 8.9–12.7)
POTASSIUM SERPL-SCNC: 4.3 MMOL/L (ref 3.5–5.3)
PROT SERPL-MCNC: 7.1 G/DL (ref 6.4–8.4)
RBC # BLD AUTO: 4.27 MILLION/UL (ref 3.81–5.12)
SODIUM SERPL-SCNC: 142 MMOL/L (ref 135–147)
VIT B12 SERPL-MCNC: 1006 PG/ML (ref 100–900)
WBC # BLD AUTO: 6.86 THOUSAND/UL (ref 4.31–10.16)

## 2023-03-21 ENCOUNTER — OFFICE VISIT (OUTPATIENT)
Dept: NEUROLOGY | Facility: CLINIC | Age: 53
End: 2023-03-21

## 2023-03-21 DIAGNOSIS — G43.009 MIGRAINE WITHOUT AURA AND WITHOUT STATUS MIGRAINOSUS, NOT INTRACTABLE: ICD-10-CM

## 2023-03-21 DIAGNOSIS — G40.909 SEIZURE DISORDER (HCC): ICD-10-CM

## 2023-03-21 DIAGNOSIS — M54.81 OCCIPITAL NEURALGIA OF RIGHT SIDE: ICD-10-CM

## 2023-03-21 DIAGNOSIS — D75.0 POLYCYTHEMIA, FAMILIAL: ICD-10-CM

## 2023-03-21 DIAGNOSIS — M79.18 MYOFASCIAL MUSCLE PAIN: Primary | ICD-10-CM

## 2023-03-21 DIAGNOSIS — G47.9 SLEEP DISTURBANCE: ICD-10-CM

## 2023-03-21 LAB — EPO SERPL-ACNC: 33.3 MIU/ML (ref 2.6–18.5)

## 2023-03-21 RX ORDER — GABAPENTIN 300 MG/1
CAPSULE ORAL
Qty: 180 CAPSULE | Refills: 2 | Status: SHIPPED | OUTPATIENT
Start: 2023-03-21

## 2023-03-21 NOTE — PROGRESS NOTES
Patient ID: Giana Chaparro is a 46 y.o. female. Assessment/Plan:    No problem-specific Assessment & Plan notes found for this encounter. {Assess/PlanSmartLinks:12142}       Subjective:    HPI    {[facility] Neurology HPI texts:77255}    {Common ambulatory SmartLinks:47592}         Objective: There were no vitals taken for this visit. Physical Exam    Neurological Exam      ROS:    Review of Systems   Constitutional: Negative. Negative for appetite change and fever. HENT: Negative. Negative for hearing loss, tinnitus, trouble swallowing and voice change. Eyes: Negative. Negative for photophobia, pain and visual disturbance. Respiratory: Negative. Negative for shortness of breath. Cardiovascular: Negative. Negative for palpitations. Gastrointestinal: Negative. Negative for nausea and vomiting. Endocrine: Negative. Negative for cold intolerance. Genitourinary: Negative. Negative for dysuria, frequency and urgency. Musculoskeletal: Negative. Negative for gait problem, myalgias and neck pain. Skin: Negative. Negative for rash. Allergic/Immunologic: Negative. Neurological: Positive for headaches (migraines are the same frequnecy as last visit). Negative for dizziness, tremors, seizures, syncope, facial asymmetry, speech difficulty, weakness, light-headedness and numbness. Hematological: Negative. Does not bruise/bleed easily. Psychiatric/Behavioral: Negative. Negative for confusion, hallucinations and sleep disturbance. All other systems reviewed and are negative.

## 2023-03-21 NOTE — PROGRESS NOTES
Patient ID: Jessica Castillo is a 48 y.o. female. Assessment/Plan:     Diagnoses and all orders for this visit:    Myofascial muscle pain  -     Trigger Injection 1 or 2 muscles  -     lidocaine (XYLOCAINE) 1 % injection 2.5 mL  -     bupivacaine (PF) (MARCAINE) 0.25 % injection 2.5 mL    Migraine without aura and without status migrainosus, not intractable  -     gabapentin (NEURONTIN) 300 mg capsule; TAKE 1 CAPSULE (300 MG) EVERY12 HOURS    Sleep disturbance    Seizure disorder (HCC)    Polycythemia, familial    Occipital neuralgia of right side          Trigger point injections were performed on an emergent basis today for myofascial pain, noted below. She tolerated these well without immediate complication. For migraine prevention continue combination of gabapentin 300 mg twice daily with Topamax 200 mg twice daily. She can continue Fioricet at the onset of a migraine headache however she should limit the medication to no more than 3 doses per week to prevent medication overuse headache. She has a sleep study coming up for evaluation of sleep function, rule out apnea. The patient should be managed by epilepsy team with a history of seizures. The next time I see her we will discuss establishing care with this team.  Thankfully she has been seizure-free for more than 20 years now due to compliance with medications. The patient should not hesitate to call me prior to her follow up with any questions or concerns. Subjective:    HPI    Ms. Jessica Castillo is a very pleasant 45 yo female here for f/u. She is a former patient of Dr. Blank Marcelo. She used to see him for seizure disorder, migraine headaches and noted to have near syncope concerns in 2018 with a normal routine EEG. Migraines are typically 1-2 times per week. She states that she has no more than 10/month typically.   She tried many other migraine meds:  -- imitrex- NS, injection and PO  -- maxalt  -- depakote, Dilantin, topamax, Gabapentin- stopped per pain management to switch to lyrica, but lyrica caused vivid dreams  -- sometimes goes in the hot shower, does not like to use use ice    She continues Topamax twice daily. She needs to make an eye doctor appointment as she states her family is predisposed to glaucoma. She would like to consider Botox in the future. She states sometimes she wakes up with a migraine. Associated with the migraine she typically gets nausea and she feels like vomiting, but no emesis. Typical triggers for her are changes in sleeping patterns or sleep deprivation, having to get up and down throughout the night and not getting enough sleep, stress/tension. Typically headaches present in the right occipital or right temporal region and are shooting in character. Touching the area causes a pins-and-needles sensation. Sometimes it wakes her up from sleep. She states trigger point injections brought the headache and myofascial pain down from a 10 to a 3-4/10 last time. She is taking Excedrin Migraine but not more than 3 doses per week. She did not use Nurtec or Decadron, prefers not to take these due to prior side effects.     She is working on getting more active and working on her diet, due to slightly high cholesterol. She is now on Crestor for this.     She follows [facility] and since epidural in the low back her low back pain has significantly improved. States she had a lumbar MRI at an open MRI center in July. She describes pain in the back and going down the right greater than left thighs. Seizures: Has not had seizures since 2010. She states she had a seizure from coming off of one of her medications on her own. Her  witnessed it and she was "foaming in the mouth."  She does endorse convulsions of all 4 extremities, hand clenching, feet/toes clench and she bites her tongue and her lips. She also is very confused after seizure. She denies any incontinence.   They started at age 27. Recently she states that she notices that she sort of bites her lip from time to time when she wakes up but she is not sure why. She does not have any other seizure-like symptoms. She is scheduled for a sleep study coming up. Prior note:  Migraines in a month would be 2-3 a month, headaches would be 1-2 a week. Nurtec did help but pt noticed a rash on the face/ neck after taking it so she stopped and the rash had gone away. Unsure if she should continue taking it or not. On questioning she states she is not sure if the rash was from the nurtec. She did not have any itching, pain or swelling with the rash. Denies SOB, CP, dysphagia, or any other allergic like sxs.     She reports a new type of headache which is a sharp pain with laying on the back of the head; pain is the R temporal occipital region, 10/10 only when laying on it. States the headache is debilitating, has occurred every night for 2 weeks now and then stopped spontaneously when she slept on her left. States the headache woke her up in the middle of the night at times. Also would describe it as a piercing or tazer feeling.     States in 2017 she had lymph nodes removed from the R side of the lateral neck, and feels this triggers the neck pain and headaches since that procedure.     She also has a seizure disorder. Bogdan Rivera last event occurred on August 30, 2010. Her most recent EEG, done in 2018 was a normal awake, drowsing and sleep study. Lulu Orourke continues on her topiramate as noted above for dual purposes.       The following portions of the patient's history were reviewed and updated as appropriate:   She  has a past medical history of Diverticulosis, Hiatal hernia, Memory loss (2107), Migraine, Migraines, Osteopenia, Peptic ulcer, Polycythemia, PVCs (premature ventricular contractions), Seizure disorder ([address]), and Seizures ([address]) (2000).   She   Patient Active Problem List    Diagnosis Date Noted   • Current every day smoker 04/24/2023 • Polycythemia 04/24/2023   • Occipital neuralgia of right side 10/16/2022   • Myofascial muscle pain 10/16/2022   • Macrocytosis 06/09/2022   • Mixed hyperlipidemia 08/24/2021   • Sleep disturbance 03/12/2020   • Polycythemia, familial 11/19/2018   • Family history of hypercoagulability 11/19/2018   • Migraine without aura and without status migrainosus, not intractable 11/01/2018   • Seizure disorder ([address]) 11/01/2018   • Near syncope 11/01/2018     She  has a past surgical history that includes Shoulder surgery (Right); Neck surgery (Right); IR biopsy bone marrow (2/26/2019); Bone marrow biopsy w/ aspiration (03/2019); Appendectomy; Colonoscopy (2011); and Hysterectomy. Her family history includes Breast cancer in her paternal aunt; Breast cancer (age of onset: 39) in her cousin; Breast cancer (age of onset: 55) in her cousin; Breast cancer (age of onset: 46) in her cousin; Clotting disorder in her brother; Hypertension in her family and father; Migraines in her family; Neuropathy in her brother; Ovarian cancer (age of onset: 72) in her maternal grandmother. She  reports that she has been smoking cigarettes. She has a 17.50 pack-year smoking history. She has been exposed to tobacco smoke. She has never used smokeless tobacco. She reports that she does not drink alcohol and does not use drugs.   Current Outpatient Medications   Medication Sig Dispense Refill   • aspirin (ECOTRIN LOW STRENGTH) 81 mg EC tablet Take 81 mg by mouth     • Calcium Carbonate-Vitamin D3 600-400 MG-UNIT TABS Take 1 tablet by mouth 2 (two) times a day      • gabapentin (NEURONTIN) 300 mg capsule TAKE 1 CAPSULE (300 MG) EVERY12 HOURS 180 capsule 2   • HYDROcodone-acetaminophen (NORCO) 7.5-325 mg per tablet   0   • morphine (MS CONTIN) 15 mg 12 hr tablet      • omeprazole (PriLOSEC) 40 MG capsule Take 40 mg by mouth 2 (two) times a day     • rosuvastatin (CRESTOR) 10 MG tablet Take 10 mg by mouth daily     • tiZANidine (ZANAFLEX) 4 mg tablet Take 4 mg by mouth daily at bedtime as needed     • topiramate (TOPAMAX) 200 MG tablet Take 1 tablet (200 mg total) by mouth 2 (two) times a day 180 tablet 2   • butalbital-acetaminophen-caffeine (FIORICET,ESGIC) -40 mg per tablet TAKE 1 TABLET BY MOUTH FOR MIGRAINE. MAY REPEAT IN 4 HRS IF NEEDED. LIMIT 2TABS/24 HRS. 30 DS. 15 tablet 2   • ketorolac (TORADOL) 10 mg tablet Take 1 tablet (10 mg total) by mouth every 6 (six) hours as needed (migraine) Max 2-3 per week. 10 tablet 2   • latanoprost (XALATAN) 0.005 % ophthalmic solution INSTILL 1 DROP INTO BOTH EYES AT BEDTIME     • Linzess 145 MCG CAPS TAKE 1 (ONE) CAPSULE BY MOUTH BEFORE BREAKFAST       Current Facility-Administered Medications   Medication Dose Route Frequency Provider Last Rate Last Admin   • bupivacaine (PF) (MARCAINE) 0.25 % injection 2.5 mL  2.5 mL Subcutaneous Once Charanjit Powell PA-C       • lidocaine (XYLOCAINE) 1 % injection 2.5 mL  2.5 mL Injection Once Charanjit Powell PA-C         She is allergic to amitriptyline and propranolol. .         Objective: There were no vitals taken for this visit. There is no height or weight on file to calculate BMI. Physical Exam    Neurological Exam  On neurologic exam, the patient is alert and oriented to time and place. Speech is fluent and articulate, and the patient follows commands appropriately. Judgment and affect appear normal. Pupils are equally round and reactive to light and extraocular muscles are intact without nystagmus. Face is symmetric, and tongue, uvula, and palate are midline. Hearing is intact. Motor examination reveals intact strength throughout. Normal gait is steady. ROS:    Review of Systems  Constitutional: Negative. Negative for appetite change and fever. HENT: Negative. Negative for hearing loss, tinnitus, trouble swallowing and voice change. Eyes: Negative. Negative for photophobia, pain and visual disturbance. Respiratory: Negative.   Negative for shortness of breath. Cardiovascular: Negative. Negative for palpitations. Gastrointestinal: Negative. Negative for nausea and vomiting. Endocrine: Negative. Negative for cold intolerance. Genitourinary: Negative. Negative for dysuria, frequency and urgency. Musculoskeletal: Negative. Negative for gait problem, myalgias and neck pain. Skin: Negative. Negative for rash. Allergic/Immunologic: Negative. Neurological: Positive for headaches (migraines are the same frequnecy as last visit). Negative for dizziness, tremors, seizures, syncope, facial asymmetry, speech difficulty, weakness, light-headedness and numbness. Hematological: Negative. Does not bruise/bleed easily. Psychiatric/Behavioral: Negative. Negative for confusion, hallucinations and sleep disturbance. All other systems reviewed and are negative. The following portions of the patient's history were reviewed and updated as appropriate: allergies, current medications/ medication history, past family history, past medical history, past social history, past surgical history and problem list.    Review of systems was reviewed and otherwise unremarkable from a neurological perspective. I have spent a total time of 40 minutes on 3/21/2023 in caring for this patient including Diagnostic results, Risks and benefits of tx options, Instructions for management, Importance of tx compliance, Risk factor reductions, Impressions, Counseling / Coordination of care, Documenting in the medical record, Reviewing / ordering tests, medicine, procedures   and Obtaining or reviewing history  . Universal Protocol:  Consent: The procedure was performed in an emergent situation. Verbal consent obtained.   Risks and benefits: risks, benefits and alternatives were discussed  Consent given by: patient    Procedure Details  Location(s):  Patient tolerance: patient tolerated the procedure well with no immediate complications  Additional procedure details: Trigger point injections were performed on an emergent basis and are a part of ongoing therapy. Risks, benefits, alternatives, infection, bleeding and allergic reaction were discussed with the patient. Verbal consent was obtained prior to the procedure and is detailed in the patient's record. Trigger point injections were performed in the following locations using a mixture of 2.5 mL 0.25% bupivacaine + 2.5 mL of 1% lidocaine, without steroid, using a 30 gauge, 1/2 inch needle: R upper traps, R middle traps, R SCM, R suboccipital region.

## 2023-03-22 RX ORDER — LIDOCAINE HYDROCHLORIDE 10 MG/ML
2.5 INJECTION, SOLUTION INFILTRATION; PERINEURAL ONCE
Status: SHIPPED | OUTPATIENT
Start: 2023-03-22

## 2023-03-22 RX ORDER — BUPIVACAINE HYDROCHLORIDE 2.5 MG/ML
2.5 INJECTION, SOLUTION EPIDURAL; INFILTRATION; INTRACAUDAL ONCE
Status: SHIPPED | OUTPATIENT
Start: 2023-03-22

## 2023-03-27 ENCOUNTER — TELEPHONE (OUTPATIENT)
Dept: HEMATOLOGY ONCOLOGY | Facility: CLINIC | Age: 53
End: 2023-03-27

## 2023-03-27 NOTE — TELEPHONE ENCOUNTER
Returned a call to pt to discuss her recent lab results  Pt was concerned about some abnormal results and I had Dr Odalys Goins review the results and informed pt nothing to be concerned about  Pt has her sleep study scheduled for Aug that is the soonest appt that was available   I let pt know that Dr Odalys Goins will review lab results more in depth at her F/U

## 2023-04-24 ENCOUNTER — PATIENT OUTREACH (OUTPATIENT)
Dept: OTHER | Facility: CLINIC | Age: 53
End: 2023-04-24

## 2023-04-24 ENCOUNTER — OFFICE VISIT (OUTPATIENT)
Dept: HEMATOLOGY ONCOLOGY | Facility: CLINIC | Age: 53
End: 2023-04-24

## 2023-04-24 VITALS
TEMPERATURE: 98.3 F | HEIGHT: 63 IN | DIASTOLIC BLOOD PRESSURE: 80 MMHG | OXYGEN SATURATION: 99 % | SYSTOLIC BLOOD PRESSURE: 132 MMHG | WEIGHT: 199 LBS | RESPIRATION RATE: 16 BRPM | HEART RATE: 100 BPM | BODY MASS INDEX: 35.26 KG/M2

## 2023-04-24 DIAGNOSIS — D75.1 POLYCYTHEMIA: Primary | ICD-10-CM

## 2023-04-24 DIAGNOSIS — F17.200 CURRENT EVERY DAY SMOKER: ICD-10-CM

## 2023-04-24 DIAGNOSIS — R71.8 HIGH SERUM ERYTHROPOIETIN: ICD-10-CM

## 2023-04-24 RX ORDER — LATANOPROST 50 UG/ML
SOLUTION/ DROPS OPHTHALMIC
COMMUNITY
Start: 2023-04-20

## 2023-04-24 RX ORDER — LINACLOTIDE 145 UG/1
CAPSULE, GELATIN COATED ORAL
COMMUNITY
Start: 2023-03-30

## 2023-04-24 NOTE — PROGRESS NOTES
[address] [id number]  Hematology Ambulatory Follow-Up  Vanda, 1970, [id number]  4/24/2023    Assessment/Plan:  1  Polycythemia  2  High serum erythropoietin  3  Current every day smoker  Ms Maryana Alvarenga is a 51-year-old female seen in follow up/transfer of care for polycythemia  This has been a longstanding issue for her  It is likely multifactorial due to current every day smoking a little more than half pack per day, as well as elevated serum erythropoietin  To rule out abdominal findings we will arrange for abdominal ultrasound to be completed  We also discussed the importance of smoking cessation and also undergoing evaluation with a sleep study which is scheduled in August   Referral was placed to smoking cessation program today  She is asymptomatic with her polycythemia without significant headaches or muscle weakness  She will repeat blood work in 1 year prior to follow-up with me in the office  I will call her with the results of the ultrasound if any further work-up or interventions are necessary  She knows to call the office with any new or worsening symptoms prior to this appointment and we can repeat blood work sooner for further evaluation     - US abdomen complete; Future  - Ambulatory Referral to Smoking Cessation Program; Future  - CBC and differential; Future  - Comprehensive metabolic panel; Future  - Erythropoietin; Future  - Carboxyhemoglobin; Future  - Iron Panel (Includes Ferritin, Iron Sat%, Iron, and TIBC); Future      The patient is scheduled for follow-up in approximately 1 year  Patient voiced agreement and understanding to the above  Patient knows to call the Hematology/Oncology office with any questions and concerns regarding the above        Barrier(s) to care: None  The patient is able to self care   ------------------------------------------------------------------------------------------------------    Chief Complaint   Patient presents with   • Follow-up       History of present illness:   Arielle Hensley is a 55-year-old female seen in follow-up/transfer of care from Dr Ana Cristina Back for polycythemia  She also has a strong family history of hypercoagulability  She underwent hypercoagulable work-up which was completely negative  She also has a strong family history of polycythemia  She underwent bone marrow biopsy in February 2019 which was negative  She likely has secondary polycythemia due to current everyday smoking use  She is scheduled to undergo sleep study in August to rule out sleep apnea  She has had a hard time quitting smoking due to significant stress in her life  She has lost 6 family members in the last 5 years  She continues to smoke a little over half pack per day  Most recent labs demonstrate slight improvement in her hemoglobin down to 15 5  Her erythropoietin though is elevated at 33 3  She has no abdominal symptoms  She denies any urinary symptoms  She denies any muscle aches/weakness  She does have hip and back pain which is also chronic  She also suffers from chronic headaches that have not been worsening recently  Review of Systems   Constitutional: Negative for activity change, appetite change, chills, fatigue, fever and unexpected weight change  HENT: Negative for ear pain and sore throat  Eyes: Negative for pain and visual disturbance  Respiratory: Negative for cough, chest tightness and shortness of breath  Cardiovascular: Negative for chest pain, palpitations and leg swelling  Gastrointestinal: Negative for abdominal pain, constipation, diarrhea and vomiting  Endocrine: Negative for cold intolerance and heat intolerance  Genitourinary: Negative for dysuria, hematuria and urgency  Musculoskeletal: Negative for arthralgias and back pain  "  Skin: Negative for color change and rash  Neurological: Positive for headaches  Negative for dizziness, seizures, syncope and numbness  Psychiatric/Behavioral: Positive for sleep disturbance  All other systems reviewed and are negative        Patient Active Problem List   Diagnosis   • Migraine without aura and without status migrainosus, not intractable   • Seizure disorder (HCC)   • Near syncope   • Polycythemia, familial   • Family history of hypercoagulability   • Sleep disturbance   • Mixed hyperlipidemia   • Macrocytosis   • Occipital neuralgia of right side   • Myofascial muscle pain   • Current every day smoker   • Polycythemia       Past Medical History:   Diagnosis Date   • Diverticulosis    • Hiatal hernia    • Memory loss 2107    Due to Topirimate   • Migraine    • Migraines    • Osteopenia    • Peptic ulcer    • Polycythemia    • PVCs (premature ventricular contractions)    • Seizure disorder (HCC)    • Seizures ([facility] [address] ) 2000       Past Surgical History:   Procedure Laterality Date   • APPENDECTOMY      \"1990's\"   • BONE MARROW BIOPSY W/ ASPIRATION  03/2019   • COLONOSCOPY  2011   • HYSTERECTOMY     • IR BIOPSY BONE MARROW  2/26/2019   • NECK SURGERY Right     resection of a mass from the posterior neck    • SHOULDER SURGERY Right     2 surgeries       Family History   Problem Relation Age of Onset   • Hypertension Family    • Migraines Family    • Hypertension Father    • Clotting disorder Brother    • Neuropathy Brother    • Ovarian cancer Maternal Grandmother 72   • Breast cancer Paternal Aunt    • Breast cancer Cousin 46   • Breast cancer Cousin 39   • Breast cancer Cousin 55       Social History     Socioeconomic History   • Marital status: /Civil Union     Spouse name: None   • Number of children: None   • Years of education: None   • Highest education level: None   Occupational History   • None   Tobacco Use   • Smoking status: Every Day     Packs/day: 0 50     Years: 35 00     Pack " years: 17 50     Types: Cigarettes     Passive exposure: Current (spouse smokes as well)   • Smokeless tobacco: Never   • Tobacco comments:     Cut back   Vaping Use   • Vaping Use: Never used   Substance and Sexual Activity   • Alcohol use: No   • Drug use: Never   • Sexual activity: Yes     Partners: Male     Birth control/protection: None     Comment: Withdrawal method with spouse   Other Topics Concern   • None   Social History Narrative   • None     Social Determinants of Health     Financial Resource Strain: Not on file   Food Insecurity: Not on file   Transportation Needs: Not on file   Physical Activity: Not on file   Stress: Not on file   Social Connections: Not on file   Intimate Partner Violence: Not on file   Housing Stability: Not on file         Current Outpatient Medications:   •  aspirin (ECOTRIN LOW STRENGTH) 81 mg EC tablet, Take 81 mg by mouth, Disp: , Rfl:   •  butalbital-acetaminophen-caffeine (FIORICET,ESGIC) -40 mg per tablet, TAKE 1 TABLET BY MOUTH FOR MIGRAINE  MAY REPEAT IN 4 HRS IF NEEDED  LIMIT 2TABS/24 HRS   30 DS , Disp: 15 tablet, Rfl: 3  •  Calcium Carbonate-Vitamin D3 600-400 MG-UNIT TABS, Take 1 tablet by mouth 2 (two) times a day , Disp: , Rfl:   •  gabapentin (NEURONTIN) 300 mg capsule, TAKE 1 CAPSULE (300 MG) EVERY12 HOURS, Disp: 180 capsule, Rfl: 2  •  HYDROcodone-acetaminophen (NORCO) 7 5-325 mg per tablet, , Disp: , Rfl: 0  •  ketorolac (TORADOL) 10 mg tablet, TAKE 1 TABLET (10 MG TOTAL) BY MOUTH EVERY 6 (SIX) HOURS AS NEEDED (MIGRAINE) MAX 2-3 PER WEEK , Disp: 10 tablet, Rfl: 3  •  latanoprost (XALATAN) 0 005 % ophthalmic solution, INSTILL 1 DROP INTO BOTH EYES AT BEDTIME, Disp: , Rfl:   •  Linzess 145 MCG CAPS, TAKE 1 (ONE) CAPSULE BY MOUTH BEFORE BREAKFAST, Disp: , Rfl:   •  morphine (MS CONTIN) 15 mg 12 hr tablet, , Disp: , Rfl:   •  omeprazole (PriLOSEC) 40 MG capsule, Take 40 mg by mouth 2 (two) times a day, Disp: , Rfl:   •  rosuvastatin (CRESTOR) 10 MG tablet, "Take 10 mg by mouth daily, Disp: , Rfl:   •  tiZANidine (ZANAFLEX) 4 mg tablet, Take 4 mg by mouth daily at bedtime as needed, Disp: , Rfl:   •  topiramate (TOPAMAX) 200 MG tablet, Take 1 tablet (200 mg total) by mouth 2 (two) times a day, Disp: 180 tablet, Rfl: 2    Current Facility-Administered Medications:   •  bupivacaine (PF) (MARCAINE) 0 25 % injection 2 5 mL, 2 5 mL, Subcutaneous, Once, Kp Anders PA-C  •  lidocaine (XYLOCAINE) 1 % injection 2 5 mL, 2 5 mL, Injection, Once, Kp Anders PA-C    Allergies   Allergen Reactions   • Amitriptyline Other (See Comments)     confusion   • Propranolol Other (See Comments)     Other reaction(s): Other (See Comments)  Distension abdominal       Objective:  /80 (BP Location: Left arm, Patient Position: Sitting, Cuff Size: Adult)   Pulse 100   Temp 98 3 °F (36 8 °C) (Temporal)   Resp 16   Ht 5' 3\" (1 6 m)   Wt 90 3 kg (199 lb)   SpO2 99%   BMI 35 25 kg/m²    Physical Exam  Constitutional:       General: She is not in acute distress  Appearance: Normal appearance  She is not ill-appearing  HENT:      Head: Normocephalic and atraumatic  Eyes:      Extraocular Movements: Extraocular movements intact  Conjunctiva/sclera: Conjunctivae normal       Pupils: Pupils are equal, round, and reactive to light  Cardiovascular:      Rate and Rhythm: Normal rate and regular rhythm  Pulses: Normal pulses  Heart sounds: Normal heart sounds  Pulmonary:      Effort: Pulmonary effort is normal  No respiratory distress  Breath sounds: Normal breath sounds  Abdominal:      General: Bowel sounds are normal  There is no distension  Tenderness: There is no abdominal tenderness  Musculoskeletal:         General: Normal range of motion  Cervical back: Normal range of motion  No tenderness  Right lower leg: No edema  Left lower leg: No edema  Lymphadenopathy:      Cervical: No cervical adenopathy     Skin:     General: Skin " is warm and dry  Capillary Refill: Capillary refill takes less than 2 seconds  Coloration: Skin is not jaundiced or pale  Neurological:      General: No focal deficit present  Mental Status: She is alert and oriented to person, place, and time  Mental status is at baseline  Motor: No weakness  Gait: Gait normal    Psychiatric:         Mood and Affect: Mood normal          Behavior: Behavior normal          Thought Content: Thought content normal          Judgment: Judgment normal          Result Review  Labs:  No visits with results within 1 Month(s) from this visit     Latest known visit with results is:   Appointment on 03/18/2023   Component Date Value Ref Range Status   • WBC 03/18/2023 6 86  4 31 - 10 16 Thousand/uL Final   • RBC 03/18/2023 4 27  3 81 - 5 12 Million/uL Final   • Hemoglobin 03/18/2023 15 5 (H)  11 5 - 15 4 g/dL Final   • Hematocrit 03/18/2023 47 1 (H)  34 8 - 46 1 % Final   • MCV 03/18/2023 110 (H)  82 - 98 fL Final   • MCH 03/18/2023 36 3 (H)  26 8 - 34 3 pg Final   • MCHC 03/18/2023 32 9  31 4 - 37 4 g/dL Final   • RDW 03/18/2023 12 8  11 6 - 15 1 % Final   • MPV 03/18/2023 9 4  8 9 - 12 7 fL Final   • Platelets 87/80/3344 236  149 - 390 Thousands/uL Final   • nRBC 03/18/2023 0  /100 WBCs Final   • Neutrophils Relative 03/18/2023 52  43 - 75 % Final   • Immat GRANS % 03/18/2023 0  0 - 2 % Final   • Lymphocytes Relative 03/18/2023 35  14 - 44 % Final   • Monocytes Relative 03/18/2023 10  4 - 12 % Final   • Eosinophils Relative 03/18/2023 2  0 - 6 % Final   • Basophils Relative 03/18/2023 1  0 - 1 % Final   • Neutrophils Absolute 03/18/2023 3 61  1 85 - 7 62 Thousands/µL Final   • Immature Grans Absolute 03/18/2023 0 02  0 00 - 0 20 Thousand/uL Final   • Lymphocytes Absolute 03/18/2023 2 39  0 60 - 4 47 Thousands/µL Final   • Monocytes Absolute 03/18/2023 0 67  0 17 - 1 22 Thousand/µL Final   • Eosinophils Absolute 03/18/2023 0 13  0 00 - 0 61 Thousand/µL Final   • Basophils Absolute 03/18/2023 0 04  0 00 - 0 10 Thousands/µL Final   • Sodium 03/18/2023 142  135 - 147 mmol/L Final   • Potassium 03/18/2023 4 3  3 5 - 5 3 mmol/L Final   • Chloride 03/18/2023 109 (H)  96 - 108 mmol/L Final   • CO2 03/18/2023 26  21 - 32 mmol/L Final   • ANION GAP 03/18/2023 7  4 - 13 mmol/L Final   • BUN 03/18/2023 16  5 - 25 mg/dL Final   • Creatinine 03/18/2023 0 76  0 60 - 1 30 mg/dL Final    Standardized to IDMS reference method   • Glucose, Fasting 03/18/2023 77  65 - 99 mg/dL Final    Specimen collection should occur prior to Sulfasalazine administration due to the potential for falsely depressed results  Specimen collection should occur prior to Sulfapyridine administration due to the potential for falsely elevated results  • Calcium 03/18/2023 9 7  8 4 - 10 2 mg/dL Final   • AST 03/18/2023 23  13 - 39 U/L Final    Specimen collection should occur prior to Sulfasalazine administration due to the potential for falsely depressed results  • ALT 03/18/2023 23  7 - 52 U/L Final    Specimen collection should occur prior to Sulfasalazine administration due to the potential for falsely depressed results  • Alkaline Phosphatase 03/18/2023 69  34 - 104 U/L Final   • Total Protein 03/18/2023 7 1  6 4 - 8 4 g/dL Final   • Albumin 03/18/2023 4 0  3 5 - 5 0 g/dL Final   • Total Bilirubin 03/18/2023 0 26  0 20 - 1 00 mg/dL Final   • eGFR 03/18/2023 90  ml/min/1 73sq m Final   • CRP 03/18/2023 9 2 (H)  <3 0 mg/L Final   • Ferritin 03/18/2023 29  8 - 388 ng/mL Final   • Vitamin B-12 03/18/2023 1,006 (H)  100 - 900 pg/mL Final   • Folate 03/18/2023 6 1  3 1 - 17 5 ng/mL Final   • LD 03/18/2023 211  140 - 271 U/L Final   • Magnesium 03/18/2023 2 0  1 9 - 2 7 mg/dL Final   • Erythropoietin 03/18/2023 33 3 (H)  2 6 - 18 5 mIU/mL Final    [employer] IAMINTOIT UniCel DxI 800 Immunoassay System  Values obtained with different assay methods or kits cannot be used  interchangeably   Results cannot be interpreted as absolute evidence  of the presence or absence of malignant disease  Imaging:   No relevant imaging to review     Please note: This report has been generated by a voice recognition software system  Therefore there may be syntax, spelling, and/or grammatical errors  Please call if you have any questions

## 2023-05-01 ENCOUNTER — PATIENT OUTREACH (OUTPATIENT)
Dept: OTHER | Facility: CLINIC | Age: 53
End: 2023-05-01

## 2023-05-02 ENCOUNTER — TELEPHONE (OUTPATIENT)
Dept: HEMATOLOGY ONCOLOGY | Facility: CLINIC | Age: 53
End: 2023-05-02

## 2023-05-02 NOTE — TELEPHONE ENCOUNTER
Patient Call    Who are you speaking with? Nilo Manzo    If it is not the patient, are they listed on an active communication consent form? N/A   What is the reason for this call? Nilo Manzo from [address] [place] [address] wanting to know if the referral placed for the ultrasound can be faxed to them at [phone]  Nilo Manzo would also like to know if the patient was supposed to get any other testing done  Does this require a call back? Yes   If a call back is required, please list best call back number [phone]   If a call back is required, advise that a message will be forwarded to their care team and someone will return their call as soon as possible  Did you relay this information to the patient?  Yes

## 2023-05-09 ENCOUNTER — TELEPHONE (OUTPATIENT)
Dept: NEUROLOGY | Facility: CLINIC | Age: 53
End: 2023-05-09

## 2023-05-09 NOTE — TELEPHONE ENCOUNTER
Pt currently scheduled on gómez for 6/20/2023  Do not see request for Botox or any notes following for a PA started for pt  Please advise further regarding BINJ appt           Thank you

## 2023-06-19 DIAGNOSIS — G43.009 MIGRAINE WITHOUT AURA AND WITHOUT STATUS MIGRAINOSUS, NOT INTRACTABLE: ICD-10-CM

## 2023-06-20 RX ORDER — BUTALBITAL, ACETAMINOPHEN AND CAFFEINE 50; 325; 40 MG/1; MG/1; MG/1
TABLET ORAL
Qty: 15 TABLET | Refills: 2 | Status: SHIPPED | OUTPATIENT
Start: 2023-06-20

## 2023-06-20 RX ORDER — KETOROLAC TROMETHAMINE 10 MG/1
10 TABLET, FILM COATED ORAL EVERY 6 HOURS PRN
Qty: 10 TABLET | Refills: 2 | Status: SHIPPED | OUTPATIENT
Start: 2023-06-20

## 2023-07-25 ENCOUNTER — PATIENT MESSAGE (OUTPATIENT)
Dept: NEUROLOGY | Facility: CLINIC | Age: 53
End: 2023-07-25

## 2023-07-25 NOTE — PATIENT COMMUNICATION
July 25, 2023  Horacio Back  to Pranav MOORE    7/25/23 12:06 PM   Spoke to the patient and added her to Tevin's schedule on Thursday at 1130am

## 2023-09-07 ENCOUNTER — TELEPHONE (OUTPATIENT)
Dept: NEUROLOGY | Facility: CLINIC | Age: 53
End: 2023-09-07

## 2023-09-07 DIAGNOSIS — G40.909 SEIZURE DISORDER (HCC): Primary | ICD-10-CM

## 2023-09-07 DIAGNOSIS — G43.009 MIGRAINE WITHOUT AURA AND WITHOUT STATUS MIGRAINOSUS, NOT INTRACTABLE: ICD-10-CM

## 2023-09-07 DIAGNOSIS — G47.9 SLEEP DISTURBANCE: ICD-10-CM

## 2023-09-07 NOTE — TELEPHONE ENCOUNTER
Recd vm  taken off   This is [employer], 1970. A sleep study was scheduled  and was rescheduled to 3/31. The central scheduler removed the appointment because my referral  . If someone can send over a sleep study referral so I can schedule ASAP. Asking to fax referrall to: [phone]. You can reach out to me.     [phone]

## 2023-09-11 ENCOUNTER — TELEPHONE (OUTPATIENT)
Dept: SLEEP CENTER | Facility: CLINIC | Age: 53
End: 2023-09-11

## 2023-09-11 RX ORDER — BUTALBITAL, ACETAMINOPHEN AND CAFFEINE 50; 325; 40 MG/1; MG/1; MG/1
TABLET ORAL
Qty: 15 TABLET | Refills: 3 | Status: SHIPPED | OUTPATIENT
Start: 2023-09-11

## 2023-09-11 RX ORDER — KETOROLAC TROMETHAMINE 10 MG/1
10 TABLET, FILM COATED ORAL EVERY 6 HOURS PRN
Qty: 10 TABLET | Refills: 3 | Status: SHIPPED | OUTPATIENT
Start: 2023-09-11

## 2023-09-11 NOTE — TELEPHONE ENCOUNTER
----- Message from Real Cole MD sent at 9/10/2023 10:48 AM EDT -----  approved  ----- Message -----  From: Leighann Callahan  Sent: 3/7/9436  10:57 AM EDT  To: Sleep Medicine [facility] Provider    This Diagnostic sleep study needs approval.     If approved please sign and return to clerical pool. If denied please include reasons why. Also provide alternative testing if warranted. Please sign and return to clerical pool.

## 2023-09-11 NOTE — TELEPHONE ENCOUNTER
Called CS, spoke to Valery Sandra and advised of th below. States that pt does not need new order. They were able to use previous order. Called pt and advised of the above. She verbalized understanding. States that everything is being taking care of. Her sleep study is scheduled on 10/17/24. CS were able to use her previous script after talking to their sup. Also, states that she is overdue for f/u appt. OVL scheduled on 12/8/23 at 0815 in [facility]. Added on cancellation list per pt's request.     Requesting refill of fioricet, toradol and topiramate be [facility] [place] on [facility]    Rxs entered.  Pls review and sign off    thanks

## 2023-09-13 ENCOUNTER — PATIENT MESSAGE (OUTPATIENT)
Dept: NEUROLOGY | Facility: CLINIC | Age: 53
End: 2023-09-13

## 2023-09-14 NOTE — PATIENT COMMUNICATION
9/13/23 at 11:38 am    This in the [facility] pharmacy leaving a message regarding mutual patient, Jillian Solitario. Date of birth [birth year]. Miss Sea Mojica sees your office for neurology, but she also sees a pain management office and I need to discuss with your office her list of medications because between the 2 offices, she is receiving several medications which either duplicate one another or conflict with one another. And it's creating an issue for us when it comes to dispensing the medications which you are prescribing. So we need to confirm that you you have a complete med list for this patient. And we need to discuss certain problems that exist between your prescriptions and the prescriptions coming from the other prescriber. Please call us back at the number on your system, and we will talk about this at your earliest convenience. Thank you very much bye. Chart reviewed  Current med list on file. topiramate 200 mg bid  Tizanidine 4 mg daily at bedtime prn  crestor 10 mg daily  prilosec 40 mg bid  Morphine 15 mg bid  linzess 145 mg   xalatan 0.005%oph sol  toradol 10 mg q6hrs prn  norco 7.5-325 mg   Gabapentin 300 mg bid  Atif carbonate vit D3 600-400 mg bid  fioircet -40 mg 1 tab prn Limit 2 tabs per 24 hrs  Asa 81 mg daily    Mekhi message sent to pt. Awaiting confirmation.

## 2023-09-29 ENCOUNTER — PATIENT MESSAGE (OUTPATIENT)
Dept: NEUROLOGY | Facility: CLINIC | Age: 53
End: 2023-09-29

## 2023-09-29 NOTE — PATIENT COMMUNICATION
Pharmacy will not fill the patients prescription for  Celebrex because they feel that the two medications( CELEBREX and Toradol) can cause ulcers    This is the message from the patient     Patient asking again if someone can reach out to the pharmacy about this matter        And to contact the patient with any updates         Pharmacy    [facility] [address] [facility], [address], [address] [place] [address]   Phone:  [phone]  Fax:  [phone]   YVON #:  [id number]     Patient [facility] #[phone] [facility]

## 2023-10-02 NOTE — PATIENT COMMUNICATION
Recd vm 9/29 taken off 10/2   I'm calling from [facility]. If you could call me back at [phone]. Or just trying to confirm, we have a patient that sees [address] [place] [address], her name is [employer]. Date of birth [birth year]. She has been getting ketorolac from Doctor sweta as needed 2 to 3 days a week. For her migraine which the patient has clarified. However, she sees a pain management provider just send me a script for  generic celebrex. patient states that your office is aware that she's on that and has discussed a plan with her on you know, how to be using both of those medications, although, you know, they shouldn't be used at the same time concurrently. So we wanted to clarify that that conversation was had with the patient. Please give me a call [phone].

## 2023-10-17 ENCOUNTER — HOSPITAL ENCOUNTER (OUTPATIENT)
Dept: SLEEP CENTER | Facility: CLINIC | Age: 53
Discharge: HOME | End: 2023-10-17
Payer: COMMERCIAL

## 2023-10-17 DIAGNOSIS — G47.9 SLEEP DISTURBANCE: ICD-10-CM

## 2023-10-17 DIAGNOSIS — G40.909 SEIZURE DISORDER (HCC): ICD-10-CM

## 2023-10-17 DIAGNOSIS — G43.009 MIGRAINE WITHOUT AURA AND WITHOUT STATUS MIGRAINOSUS, NOT INTRACTABLE: ICD-10-CM

## 2023-10-17 PROCEDURE — 95810 POLYSOM 6/> YRS 4/> PARAM: CPT

## 2023-10-18 PROBLEM — G47.33 OSA (OBSTRUCTIVE SLEEP APNEA): Status: ACTIVE | Noted: 2023-10-18

## 2023-10-18 NOTE — PROGRESS NOTES
Sleep Study Documentation    Pre-Sleep Study       Sleep testing procedure explained to patient:YES    Patient napped prior to study:NO    Caffeine:Dayshift worker after 12PM.  Caffeine use:YES- coffee  6 to 18 ounces and soda  12 ounces    Alcohol:Dayshift workers after 5PM: Alcohol use:NO    Typical day for patient:YES       Study Documentation    Sleep Study Indications: Chronic headache, BMI >30, unrefreshing sleep    Sleep Study: Diagnostic   Snore: Moderate  Supplemental O2: no      Minimum SaO2 91%  Baseline SaO2 95%    EKG abnormalities: no     EEG abnormalities: no    Were abnormal behaviors in sleep observed:NO    Is Total Sleep Study Recording Time < 2 hours: N/A    Is Total Sleep Study Recording Time > 2 hours but study is incomplete: N/A    Is Total Sleep Study Recording Time 6 hours or more but sleep was not obtained: NO    Patient classification: employed       Post-Sleep Study    Medication used at bedtime or during sleep study:YES other prescription medications    Patient reports time it took to fall asleep:less than 20 minutes    Patient reports waking up during study:1 to 2 times. Patient reports returning to sleep in 10 to 30 minutes. Patient reports sleeping 4 to 6 hours without dreaming. Does the Patient feel this is a typical night of sleep:better than usual    Patient rated sleepiness: Somewhat sleepy or tired    PAP treatment:no.

## 2023-10-25 ENCOUNTER — PATIENT MESSAGE (OUTPATIENT)
Dept: NEUROLOGY | Facility: CLINIC | Age: 53
End: 2023-10-25

## 2023-10-25 NOTE — PATIENT COMMUNICATION
This was the message you sent pt via [email or web address] regarding her results-     Shayan Hawkins, I reviewed this sleep study and it was normal. We can discuss the results further at your next follow up if you would like. Please let me know if you have any questions. Thanks.    Written by Vinita Min PA-C on 10/24/2023 12:06 PM EDT  Seen by patient Heather Lopez on 10/25/2023  4:19 AM

## 2023-10-25 NOTE — PATIENT COMMUNICATION
Patient called back to check if [facility], [address] [facility] got her message. Informed her it was sent to [address] [place] [address], [address] [facility] but waiting for her response.

## 2023-10-25 NOTE — PATIENT COMMUNICATION
Pt called in to make sure [facility] saw the message she sent on [employer]. She would like a call back when available. Thank you.

## 2023-11-15 ENCOUNTER — TELEPHONE (OUTPATIENT)
Dept: NEUROLOGY | Facility: CLINIC | Age: 53
End: 2023-11-15

## 2023-11-15 NOTE — TELEPHONE ENCOUNTER
Request faxed to [facility] for [facility] to have last office note confirming diagnosis of glaucoma faxed to [facility] office.

## 2023-11-15 NOTE — TELEPHONE ENCOUNTER
Regarding: FW: Sleep study results  Contact: [phone]    ----- Message -----  From: Guy Dixon  Sent: 11/14/2023  11:07 AM EST  To: Neurology [facility] Clinical Team 5  Subject: FW: Sleep study results                            ----- Message -----  From: Sona Don: 11/14/2023  10:33 AM EST  To: Neurology [place] Clinical  Subject: Sleep study results                              Thanks so much for the phone call Friday. It felt good to speak with you over the phone and be able to explain things (break things down), and personally hear your expert opinion by phone. I really appreciate it and look forward to hearing back from you on those matters we spoke about.      Thank you,  Barnet Severance

## 2023-12-08 ENCOUNTER — PATIENT MESSAGE (OUTPATIENT)
Dept: NEUROLOGY | Facility: CLINIC | Age: 53
End: 2023-12-08

## 2023-12-08 NOTE — PATIENT COMMUNICATION
Pt called in to r/s missed appt with [address]. New appt is now 12/12 @ 12:15 in [facility] with [address].

## 2023-12-12 ENCOUNTER — OFFICE VISIT (OUTPATIENT)
Dept: NEUROLOGY | Facility: CLINIC | Age: 53
End: 2023-12-12
Payer: COMMERCIAL

## 2023-12-12 VITALS
DIASTOLIC BLOOD PRESSURE: 83 MMHG | HEART RATE: 83 BPM | SYSTOLIC BLOOD PRESSURE: 139 MMHG | BODY MASS INDEX: 36.11 KG/M2 | WEIGHT: 203.8 LBS | HEIGHT: 63 IN

## 2023-12-12 DIAGNOSIS — G40.909 SEIZURE DISORDER (HCC): Primary | ICD-10-CM

## 2023-12-12 DIAGNOSIS — G43.009 MIGRAINE WITHOUT AURA AND WITHOUT STATUS MIGRAINOSUS, NOT INTRACTABLE: ICD-10-CM

## 2023-12-12 DIAGNOSIS — D75.0 POLYCYTHEMIA, FAMILIAL: ICD-10-CM

## 2023-12-12 DIAGNOSIS — M79.18 MYOFASCIAL MUSCLE PAIN: ICD-10-CM

## 2023-12-12 PROCEDURE — 99215 OFFICE O/P EST HI 40 MIN: CPT | Performed by: PHYSICIAN ASSISTANT

## 2023-12-12 RX ORDER — KETOROLAC TROMETHAMINE 10 MG/1
10 TABLET, FILM COATED ORAL EVERY 6 HOURS PRN
Qty: 10 TABLET | Refills: 3 | Status: SHIPPED | OUTPATIENT
Start: 2023-12-12
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
## 2023-12-12 NOTE — PROGRESS NOTES
Patient ID: Shruthi Kaur is a 53 y.o. female.    Assessment/Plan:    No problem-specific Assessment & Plan notes found for this encounter.       {Assess/PlanSmartLinks:11233}       Subjective:    HPI  Migraines are still an issue, has been sz free, no TPI's today, wants to go over paperwork for medications  {[facility] HPI texts:56037}    {Common ambulatory SmartLinks:64403}         Objective:    There were no vitals taken for this visit.    Physical Exam    Neurological Exam      ROS:    Review of Systems   Constitutional:  Negative for appetite change, fatigue and fever.   HENT: Negative.  Negative for hearing loss, tinnitus, trouble swallowing and voice change.    Eyes: Negative.  Negative for photophobia, pain and visual disturbance.   Respiratory: Negative.  Negative for shortness of breath.    Cardiovascular: Negative.  Negative for palpitations.   Gastrointestinal: Negative.  Negative for nausea and vomiting.   Endocrine: Negative.  Negative for cold intolerance.   Genitourinary: Negative.  Negative for dysuria, frequency and urgency.   Musculoskeletal:  Negative for back pain, gait problem, myalgias and neck pain.   Skin: Negative.  Negative for rash.   Allergic/Immunologic: Negative.    Neurological:  Positive for seizures and headaches. Negative for dizziness, tremors, syncope, facial asymmetry, speech difficulty, weakness, light-headedness and numbness.   Hematological: Negative.  Does not bruise/bleed easily.   Psychiatric/Behavioral: Negative.  Negative for confusion, hallucinations and sleep disturbance.

## 2023-12-14 ENCOUNTER — TELEPHONE (OUTPATIENT)
Dept: NEUROLOGY | Facility: CLINIC | Age: 53
End: 2023-12-14

## 2023-12-14 PROBLEM — R55 NEAR SYNCOPE: Status: RESOLVED | Noted: 2018-11-01 | Resolved: 2023-12-14

## 2023-12-14 NOTE — TELEPHONE ENCOUNTER
I called [facility] for sight on Tuesday, 12/12/2023 to ask the eye doctor about Topamax interaction/contraindication with glaucoma as the patient was recently diagnosed with this.  Received a call back Tuesday evening and could not connect back with them.  I called back today and was placed on hold and ultimately voicemail.  I left a voicemail with my cell phone number and office number.  Please let me know if the office calls back.  If they do call back can you please confirm that they got my message/question: Is it okay for this patient to be on a high dose of Topamax 200 mg twice daily in the setting of glaucoma, or should we switch the seizure medication to something different?

## 2023-12-20 NOTE — TELEPHONE ENCOUNTER
Called [facility] at [phone], spoke to Amy and advised of all of the below. She verbalized understanding. States that they called our office on 12/12/23 and left a message letting us know that pt is ok to be on a high dose of topamax 200 mg bid.

## 2023-12-28 ENCOUNTER — PATIENT MESSAGE (OUTPATIENT)
Dept: NEUROLOGY | Facility: CLINIC | Age: 53
End: 2023-12-28

## 2023-12-28 DIAGNOSIS — G43.009 MIGRAINE WITHOUT AURA AND WITHOUT STATUS MIGRAINOSUS, NOT INTRACTABLE: ICD-10-CM

## 2024-01-10 NOTE — PATIENT COMMUNICATION
1/9 11:51    Pt left a VM re: med refills.    Transcribed VM:   Uh, good afternoon. My name is Shruthi Kaur. YOB: 1978. This message is for Lizeth Kamara. I was calling for refills, I had left a message on the portal. But I was calling for a refill on my Butalbital 50- mg I think is the dose. And it is taking every 4 to 6 hours as needed. It's a PRN medication for my migraines. And also the Gabapentin 300 mg. I know that you said that you may want to go up on that, but right now I'm at 300 mg, and you have me taking that 2 tablets at night and 1 one during the day. I don't know if you changed that, but you had me taking one capsule by mouth every 12 hours, but you told me verbally to take 2 at night and one during the day, so that's 3 times a day. So if that can be called over to [facility] pharmacy on [address] [facility]. I have no refills at all on those 2 medications. The Ketorolac, you must of sent a refill over on that for my last visit because I have 2 refills on that. So I'm okay with that if you need to speak to me, you can call me at [phone]. Thank you.    Please sign off if agreeable. Thank you.

## 2024-01-11 RX ORDER — BUTALBITAL, ACETAMINOPHEN AND CAFFEINE 50; 325; 40 MG/1; MG/1; MG/1
TABLET ORAL
Qty: 15 TABLET | Refills: 3 | Status: SHIPPED | OUTPATIENT
Start: 2024-01-11

## 2024-01-11 RX ORDER — GABAPENTIN 300 MG/1
CAPSULE ORAL
Qty: 270 CAPSULE | Refills: 3 | Status: SHIPPED | OUTPATIENT
Start: 2024-01-11

## 2024-01-11 NOTE — PATIENT COMMUNICATION
Patient contacted and appointment scheduled for 6/11/24 @ 12:15 pm in the [place] Office with Lizeth Kamara PA-C.

## 2024-01-11 NOTE — TELEPHONE ENCOUNTER
Sent meds.  Clerical- can you please schedule pt for f/u. For some reason she was not rescheduled after her last f/u. She can do 4-6 months [facility]

## 2024-02-22 ENCOUNTER — TELEPHONE (OUTPATIENT)
Dept: NEUROLOGY | Facility: CLINIC | Age: 54
End: 2024-02-22

## 2024-02-22 DIAGNOSIS — G43.009 MIGRAINE WITHOUT AURA AND WITHOUT STATUS MIGRAINOSUS, NOT INTRACTABLE: ICD-10-CM

## 2024-02-22 NOTE — TELEPHONE ENCOUNTER
Received VM transcription from 2/20/24, 2:52 PM:    Yes, hi, good afternoon. This is Shruthi Kaur. Date of birth June 8, 1970. This message is for a nurse or the doctor. I am calling because at the beginning of the year or last year, I did indicate that my Ketorolac and butalbital needed to be called in to my insurance company for a pre authorization. I received the letter and I'm not sure if you guben received it, dated February 2nd of 2024, that they gave me a temporary supplies of the following prescriptions, ketorolac and that I needed to either call them or call you guys. Somebody needs to call the insurance company and let them know about this medication. And before they hit me with the butalbital. Please give me a call back [phone]. Please get a pre authorization or a tier exception like I asked last month or in December. Thank you so much. Have a good day.

## 2024-02-23 NOTE — TELEPHONE ENCOUNTER
Fioricet PA initiated on CMM. Key: [id number]  Additional Information Required  The patient currently has access to the requested medication and a Prior Authorization is not needed for the patient/medication.    Toradol PA initiated on CMM. Key: [id number]  Additional Information Required  The patient currently has access to the requested medication and a Prior Authorization is not needed for the patient/medication.    Pt made aware and states that she received a letter from her ins stating that Fioricet and Butalbital is not on her formulary list. Advised of the above. She verbalized understanding.   Also, states that in a month, she uses toradol 2-3 tabs per week. 10 tabs is not enough to last her 30 days.   Advised pt that I will have to ask MK if she is willing to send 12 tabs per 30 days instead. Pt verbalized understanding    Rx entered. Pls review and sign off    thanks

## 2024-02-26 RX ORDER — KETOROLAC TROMETHAMINE 10 MG/1
10 TABLET, FILM COATED ORAL EVERY 6 HOURS PRN
Qty: 12 TABLET | Refills: 6 | Status: SHIPPED | OUTPATIENT
Start: 2024-02-26

## 2024-03-15 NOTE — TELEPHONE ENCOUNTER
Called patient for [facility] study 3 month follow-up visit  Patient reported that she was not diagnosed with a PE/DVT in the last 3 months  This concludes her participation in the study  Holding RN to OR RN

## 2024-04-02 DIAGNOSIS — L65.9 ALOPECIA: Primary | ICD-10-CM

## 2024-04-04 DIAGNOSIS — L65.9 ALOPECIA: Primary | ICD-10-CM

## 2024-04-17 ENCOUNTER — TELEPHONE (OUTPATIENT)
Dept: HEMATOLOGY ONCOLOGY | Facility: CLINIC | Age: 54
End: 2024-04-17

## 2024-04-17 NOTE — TELEPHONE ENCOUNTER
Appointment Change  Cancel, Reschedule, Change to Virtual      Who are you speaking with? Patient   If it is not the patient, is the caller listed on the communication consent form? N/A   Which provider is the appointment scheduled with? SHRAVAN Coley   When was the original appointment scheduled?    Please list date and time 04/25/2024 @ 1:30PM    At which location is the appointment scheduled to take place? [place]   Was the appointment rescheduled?     Was the appointment changed from an in person visit to a virtual visit?    If so, please list the details of the change. Yes, 06/24/2024 @ 10:20AM with Dr. Adam   What is the reason for the appointment change? provider

## 2024-05-14 ENCOUNTER — PATIENT MESSAGE (OUTPATIENT)
Dept: NEUROLOGY | Facility: CLINIC | Age: 54
End: 2024-05-14

## 2024-05-14 ENCOUNTER — HOSPITAL ENCOUNTER (OUTPATIENT)
Dept: NEUROLOGY | Facility: CLINIC | Age: 54
Discharge: HOME | End: 2024-05-14
Payer: COMMERCIAL

## 2024-05-14 DIAGNOSIS — G43.009 MIGRAINE WITHOUT AURA AND WITHOUT STATUS MIGRAINOSUS, NOT INTRACTABLE: ICD-10-CM

## 2024-05-14 DIAGNOSIS — G40.909 SEIZURE DISORDER (HCC): ICD-10-CM

## 2024-05-14 DIAGNOSIS — R71.8 HIGH SERUM ERYTHROPOIETIN: Primary | ICD-10-CM

## 2024-05-14 DIAGNOSIS — D75.1 POLYCYTHEMIA: ICD-10-CM

## 2024-05-14 PROCEDURE — 95819 EEG AWAKE AND ASLEEP: CPT

## 2024-05-14 PROCEDURE — 95819 EEG AWAKE AND ASLEEP: CPT | Performed by: PSYCHIATRY & NEUROLOGY

## 2024-05-14 RX ORDER — BUTALBITAL, ACETAMINOPHEN AND CAFFEINE 50; 325; 40 MG/1; MG/1; MG/1
TABLET ORAL
Qty: 15 TABLET | Refills: 3 | Status: SHIPPED | OUTPATIENT
Start: 2024-05-14

## 2024-06-11 ENCOUNTER — TELEMEDICINE (OUTPATIENT)
Dept: NEUROLOGY | Facility: CLINIC | Age: 54
End: 2024-06-11
Payer: COMMERCIAL

## 2024-06-11 DIAGNOSIS — M79.18 MYOFASCIAL MUSCLE PAIN: Primary | ICD-10-CM

## 2024-06-11 DIAGNOSIS — G40.909 SEIZURE DISORDER (HCC): ICD-10-CM

## 2024-06-11 DIAGNOSIS — M54.81 OCCIPITAL NEURALGIA OF RIGHT SIDE: ICD-10-CM

## 2024-06-11 DIAGNOSIS — G43.009 MIGRAINE WITHOUT AURA AND WITHOUT STATUS MIGRAINOSUS, NOT INTRACTABLE: ICD-10-CM

## 2024-06-11 PROCEDURE — 99213 OFFICE O/P EST LOW 20 MIN: CPT | Performed by: PHYSICIAN ASSISTANT

## 2024-06-11 RX ORDER — GABAPENTIN 100 MG/1
CAPSULE ORAL
Qty: 270 CAPSULE | Refills: 0 | Status: SHIPPED | OUTPATIENT
Start: 2024-06-11
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
## 2024-06-24 ENCOUNTER — TELEPHONE (OUTPATIENT)
Dept: HEMATOLOGY ONCOLOGY | Facility: CLINIC | Age: 54
End: 2024-06-24

## 2024-06-24 NOTE — TELEPHONE ENCOUNTER
Spoke with patient since she has not had her labs done we will need to r/s her appointment to 8/12/24 at 3pm.

## 2024-08-06 ENCOUNTER — CONSULT (OUTPATIENT)
Dept: DERMATOLOGY | Facility: CLINIC | Age: 54
End: 2024-08-06
Payer: COMMERCIAL

## 2024-08-06 VITALS — WEIGHT: 198 LBS | BODY MASS INDEX: 35.08 KG/M2 | HEIGHT: 63 IN

## 2024-08-06 DIAGNOSIS — L65.9 ALOPECIA: ICD-10-CM

## 2024-08-06 PROCEDURE — 99204 OFFICE O/P NEW MOD 45 MIN: CPT | Performed by: DERMATOLOGY

## 2024-08-06 RX ORDER — TRIAMCINOLONE ACETONIDE 1 MG/G
CREAM TOPICAL
Qty: 45 G | Refills: 2 | Status: SHIPPED | OUTPATIENT
Start: 2024-08-06

## 2024-08-06 NOTE — PATIENT INSTRUCTIONS
- Patient to start - Triamcinolone 1% lotion once a day for 30 days    Discussed 5% minoxidil foam qHS for men over the counter . Counseled patient that they may see some increased shedding within the first few weeks of treatment, which is a normal side effect and will resolve spontaneously. They may also see mild facial hypertrichosis. Educated that treatment should be continuous to maintain efficacy    Labs ordered ( TSH, Thyroid peroxidase and thyroglobulin)

## 2024-08-06 NOTE — PROGRESS NOTES
"[facility] Note     Patient Name: Shruthi Kaur  Encounter Date: 08/06/2024     Have you been cared for by a [facility] Dermatologist in the last 3 years and, if so, which description applies to you?    NO.   I am considered a \"new\" patient and must complete all patient intake questions. I am FEMALE/of child-bearing potential.                    P      Allergies   Allergen Reactions   • Amitriptyline Other (See Comments)     confusion   • Propranolol Other (See Comments)     Other reaction(s): Other (See Comments)  Distension abdominal      Current Outpatient Medications:   •  triamcinolone (KENALOG) 0.1 % cream, Apply BID for up to 2 weeks to affected skin on neck down prn flares then take one week break and can repeat regimen prn flares. Do not apply to groin, skin folds, face., Disp: 45 g, Rfl: 2  •  aspirin (ECOTRIN LOW STRENGTH) 81 mg EC tablet, Take 81 mg by mouth, Disp: , Rfl:   •  butalbital-acetaminophen-caffeine (FIORICET,ESGIC) -40 mg per tablet, TAKE 1 TABLET BY MOUTH FOR MIGRAINE. MAY REPEAT IN 4 HRS IF NEEDED. LIMIT 2TABS/24 HRS. 30 DS., Disp: 15 tablet, Rfl: 3  •  Calcium Carbonate-Vitamin D3 600-400 MG-UNIT TABS, Take 1 tablet by mouth 2 (two) times a day , Disp: , Rfl:   •  CeleBREX 200 MG capsule, Take 200 mg by mouth daily, Disp: , Rfl:   •  gabapentin (Neurontin) 100 mg capsule, Add 100 mg qhs x 5 days, then 200 mg qhs x 5 days, then 300 mg qhs (in addition to 600 mg qhs), Disp: 270 capsule, Rfl: 0  •  gabapentin (NEURONTIN) 300 mg capsule, TAKE 1 CAPSULE (300 MG) IN THE MORNING AND 2 CAPSULES AT NIGHT, Disp: 270 capsule, Rfl: 3  •  HYDROcodone-acetaminophen (NORCO) 7.5-325 mg per tablet, , Disp: , Rfl: 0  •  ketorolac (TORADOL) 10 mg tablet, Take 1 tablet (10 mg total) by mouth every 6 (six) hours as needed (migraine) Max 2-3 per week., Disp: 12 tablet, Rfl: 6  •  latanoprost (XALATAN) 0.005 % ophthalmic solution, INSTILL 1 DROP INTO BOTH EYES AT BEDTIME, Disp: , Rfl: "   •  Linzess 145 MCG CAPS, TAKE 1 (ONE) CAPSULE BY MOUTH BEFORE BREAKFAST, Disp: , Rfl:   •  morphine (MS CONTIN) 15 mg 12 hr tablet, , Disp: , Rfl:   •  omeprazole (PriLOSEC) 40 MG capsule, Take 40 mg by mouth 2 (two) times a day, Disp: , Rfl:   •  rosuvastatin (CRESTOR) 10 MG tablet, Take 10 mg by mouth daily, Disp: , Rfl:   •  tiZANidine (ZANAFLEX) 4 mg tablet, Take 4 mg by mouth daily at bedtime as needed, Disp: , Rfl:   •  topiramate (TOPAMAX) 200 MG tablet, Take 1 tablet (200 mg total) by mouth 2 (two) times a day, Disp: 180 tablet, Rfl: 3    Current Facility-Administered Medications:   •  bupivacaine (PF) (MARCAINE) 0.25 % injection 2.5 mL, 2.5 mL, Subcutaneous, Once, Lizeth Kamara PA-C  •  lidocaine (XYLOCAINE) 1 % injection 2.5 mL, 2.5 mL, Injection, Once, Lizeth Kamara PA-C          Whom besides the patient is providing clinical information about today's encounter?   NO ADDITIONAL HISTORIAN (patient alone provided history)    Physical Exam and Assessment/Plan by Diagnosis:      History of Present Condition:      Duration: 1 year  Shedding/Decreased density?: Y  Previous treatments: N/A  Current treatments: N  Shampoo frequency: once a week   Styling products: mouse and gel   Heat styling? Once a every 2 weeks  Hair color? Every once in a while  Relaxers/Perms? Relaxer every 2 months  Extensions/Weave? N                    Hx of breast cancer? N              History of racing heart rate/arrhythmia? N              History of low blood pressure? N  Thyroid disease? N  Anemia? N  Vitamin D Deficiency? Y  Family history of hair loss? Y mother   Dietary Restrictions? N  New medications ? N  Recent stressors: Yes several members of family have  in the past 5 years.     ALOPECIA - CCA versus pattern.      Physical Exam:    Well appearing, in no acute distress. Well nourishes, well developed. Alert and oriented. A focused skin exam was performed including scalp and hair. The exam was negative except as  noted below:     Scalp:   Thinning over the vertex scalp   Negative hair pull test  No perifollicular erythema or scale  Follicular orifices in tact  Hairline:  Eyebrows and Eyelashes In tact  Pertinent Positives: No active circular lesion at this time but does have a history of circular patterned hair loss  Pertinent Negatives:      - Patient to start - Triamcinolone 1% lotion once a day for 30 days    Discussed 5% minoxidil foam qHS for men over the counter . Counseled patient that they may see some increased shedding within the first few weeks of treatment, which is a normal side effect and will resolve spontaneously. They may also see mild facial hypertrichosis. Educated that treatment should be continuous to maintain efficacy  Labs ordered ( TSH, Thyroid peroxidase and thyroglobulin)   Note that clinically this appears to be pattern without scaring but historically the patient has experience circular patches of allopecia that resolved spontaneouslyu.  She has not affected areas at this time.  Will start initial topical therapy but  biopsy may be indicated if persistent differential considerations.    - Will see patient back in 6 mos to assess progress    Scribe Attestation    I,:  Maria M Raymundo am acting as a scribe while in the presence of the attending physician.:       I,:  Zach Lainez MD personally performed the services described in this documentation    as scribed in my presence.:

## 2024-08-27 DIAGNOSIS — M79.18 MYOFASCIAL MUSCLE PAIN: ICD-10-CM

## 2024-08-27 DIAGNOSIS — M54.81 OCCIPITAL NEURALGIA OF RIGHT SIDE: ICD-10-CM

## 2024-08-27 DIAGNOSIS — G43.009 MIGRAINE WITHOUT AURA AND WITHOUT STATUS MIGRAINOSUS, NOT INTRACTABLE: ICD-10-CM

## 2024-08-27 RX ORDER — GABAPENTIN 100 MG/1
CAPSULE ORAL
Qty: 270 CAPSULE | Refills: 1 | Status: SHIPPED | OUTPATIENT
Start: 2024-08-27

## 2024-08-28 ENCOUNTER — PATIENT MESSAGE (OUTPATIENT)
Dept: NEUROLOGY | Facility: CLINIC | Age: 54
End: 2024-08-28

## 2024-08-28 DIAGNOSIS — G43.009 MIGRAINE WITHOUT AURA AND WITHOUT STATUS MIGRAINOSUS, NOT INTRACTABLE: ICD-10-CM

## 2024-08-30 RX ORDER — KETOROLAC TROMETHAMINE 10 MG/1
10 TABLET, FILM COATED ORAL EVERY 6 HOURS PRN
Qty: 12 TABLET | Refills: 0 | Status: SHIPPED | OUTPATIENT
Start: 2024-08-30

## 2024-09-06 ENCOUNTER — TELEPHONE (OUTPATIENT)
Age: 54
End: 2024-09-06

## 2024-09-09 ENCOUNTER — PATIENT MESSAGE (OUTPATIENT)
Dept: NEUROLOGY | Facility: CLINIC | Age: 54
End: 2024-09-09

## 2024-09-09 ENCOUNTER — TELEPHONE (OUTPATIENT)
Age: 54
End: 2024-09-09

## 2024-09-09 DIAGNOSIS — G43.009 MIGRAINE WITHOUT AURA AND WITHOUT STATUS MIGRAINOSUS, NOT INTRACTABLE: ICD-10-CM

## 2024-09-09 NOTE — TELEPHONE ENCOUNTER
STEVEN called patient at [phone].  Shruthi uses [employer].  Patient said the she spoke with her primary care physician and they are going to fill out the form this time for her.  Discussed future plans for paying the electric so that patient doesn't have this situation come up again.  Patient said she spoke with PP and they are coming up with a plan for her for payment arrangements for the future.  Expressed no other needs at this time.  SW remains available and will keep task open for period of time in case patient needs anything from office.

## 2024-09-09 NOTE — TELEPHONE ENCOUNTER
Patient is having Electric company Fax us a form to be filled out by provider due to their service getting shut off      Fax will be [facility]       Patient stated they are disabled and they called the [facility] for help and they have not responded as yet      Patient is concerned that the electric will be turned off as of tomorrow 9/10/24 and asking if the provider can complete the form and include the information needed to the electric company or if there is some other help for the patient to keep utilities on for now

## 2024-09-10 ENCOUNTER — TELEPHONE (OUTPATIENT)
Age: 54
End: 2024-09-10

## 2024-09-11 RX ORDER — BUTALBITAL, ACETAMINOPHEN AND CAFFEINE 50; 325; 40 MG/1; MG/1; MG/1
TABLET ORAL
Qty: 15 TABLET | Refills: 0 | Status: SHIPPED | OUTPATIENT
Start: 2024-09-11 | End: 2024-09-19 | Stop reason: SDUPTHER

## 2024-09-11 NOTE — PATIENT COMMUNICATION
topiramate sent to pharm on 6/11 for a 90 day supply with 3 refills.  there should be refills available  toradol sent on 8/30.  too soon to fill    fioricet due for refill-refill entered, please sign off     [employer] message sent to pt regarding above

## 2024-09-11 NOTE — PATIENT COMMUNICATION
Ledy RUSHING    9/11/24  9:19 AM  Unsigned Note     Scheduled patient's F/U appointment with Deysi 2/4 @ 2 pm in  [place] office.     Patient is requesting a refill on:      Fioricet -40 mg  Topiramate 200 mg  Toradol  10 mg     Please assist

## 2024-09-11 NOTE — PATIENT COMMUNICATION
Scheduled patient's F/U appointment with Deysi 2/4 @ 2 pm in  [place] office.    Patient is requesting a refill on:     Fioricet -40 mg  Topiramate 200 mg  Toradol  10 mg    Please assist

## 2024-09-15 ENCOUNTER — PATIENT MESSAGE (OUTPATIENT)
Dept: NEUROLOGY | Facility: CLINIC | Age: 54
End: 2024-09-15

## 2024-09-15 DIAGNOSIS — G43.009 MIGRAINE WITHOUT AURA AND WITHOUT STATUS MIGRAINOSUS, NOT INTRACTABLE: ICD-10-CM

## 2024-09-16 NOTE — PATIENT COMMUNICATION
Fioricet sent to pharm on 9/11 with no refills  Ketorolac sent to pharm on 8/30 with no refills.    Please send to pharm with additional refills if agreeable. There were refills on previous scripts

## 2024-09-19 ENCOUNTER — TELEPHONE (OUTPATIENT)
Age: 54
End: 2024-09-19

## 2024-09-19 ENCOUNTER — HOSPITAL ENCOUNTER (OUTPATIENT)
Dept: CT IMAGING | Facility: HOSPITAL | Age: 54
End: 2024-09-19
Payer: COMMERCIAL

## 2024-09-19 ENCOUNTER — APPOINTMENT (OUTPATIENT)
Dept: LAB | Facility: HOSPITAL | Age: 54
End: 2024-09-19
Payer: COMMERCIAL

## 2024-09-19 ENCOUNTER — HOSPITAL ENCOUNTER (OUTPATIENT)
Dept: ULTRASOUND IMAGING | Facility: HOSPITAL | Age: 54
End: 2024-09-19
Attending: INTERNAL MEDICINE
Payer: COMMERCIAL

## 2024-09-19 DIAGNOSIS — R71.8 OTHER ABNORMALITY OF RED BLOOD CELLS: ICD-10-CM

## 2024-09-19 DIAGNOSIS — L65.9 ALOPECIA: Primary | ICD-10-CM

## 2024-09-19 DIAGNOSIS — D75.1 SECONDARY POLYCYTHEMIA: ICD-10-CM

## 2024-09-19 DIAGNOSIS — H53.2 DIPLOPIA: ICD-10-CM

## 2024-09-19 LAB — TSH SERPL DL<=0.05 MIU/L-ACNC: 0.79 UIU/ML (ref 0.45–4.5)

## 2024-09-19 PROCEDURE — 70450 CT HEAD/BRAIN W/O DYE: CPT

## 2024-09-19 PROCEDURE — 84432 ASSAY OF THYROGLOBULIN: CPT

## 2024-09-19 PROCEDURE — 76700 US EXAM ABDOM COMPLETE: CPT

## 2024-09-19 PROCEDURE — 84443 ASSAY THYROID STIM HORMONE: CPT

## 2024-09-19 PROCEDURE — 86800 THYROGLOBULIN ANTIBODY: CPT

## 2024-09-19 PROCEDURE — 86376 MICROSOMAL ANTIBODY EACH: CPT

## 2024-09-19 PROCEDURE — 36415 COLL VENOUS BLD VENIPUNCTURE: CPT

## 2024-09-19 RX ORDER — KETOROLAC TROMETHAMINE 10 MG/1
10 TABLET, FILM COATED ORAL EVERY 6 HOURS PRN
Qty: 12 TABLET | Refills: 0 | Status: SHIPPED | OUTPATIENT
Start: 2024-09-19

## 2024-09-19 RX ORDER — BUTALBITAL, ACETAMINOPHEN AND CAFFEINE 50; 325; 40 MG/1; MG/1; MG/1
TABLET ORAL
Qty: 15 TABLET | Refills: 0 | Status: SHIPPED | OUTPATIENT
Start: 2024-09-19

## 2024-09-19 NOTE — TELEPHONE ENCOUNTER
Dispenses     Dispensed Days Supply Quantity Provider Pharmacy   KETOROLAC 10 MG TABLET 09/03/2024 30 12 each Lizeth Kamara PA-C [id number] - ...

## 2024-09-20 LAB
THYROGLOB AB SERPL-ACNC: <1 IU/ML (ref 0–0.9)
THYROGLOB SERPL-MCNC: 46.7 NG/ML (ref 1.5–38.5)

## 2024-09-21 LAB — THYROPEROXIDASE AB SERPL-ACNC: 10 IU/ML (ref 0–34)

## 2024-10-02 ENCOUNTER — TELEPHONE (OUTPATIENT)
Dept: DERMATOLOGY | Facility: CLINIC | Age: 54
End: 2024-10-02

## 2024-10-02 NOTE — TELEPHONE ENCOUNTER
UNABLE TO LMOM that 12/04/24 appt paco/Jeni was cx & rs to Dr Soni, due to change in her sched, MAILBOX FULL, LETTER MAILED

## 2024-10-22 DIAGNOSIS — G43.009 MIGRAINE WITHOUT AURA AND WITHOUT STATUS MIGRAINOSUS, NOT INTRACTABLE: ICD-10-CM

## 2024-10-23 RX ORDER — GABAPENTIN 300 MG/1
CAPSULE ORAL
Qty: 270 CAPSULE | Refills: 0 | Status: SHIPPED | OUTPATIENT
Start: 2024-10-23

## 2024-10-26 ENCOUNTER — APPOINTMENT (OUTPATIENT)
Dept: LAB | Facility: HOSPITAL | Age: 54
End: 2024-10-26
Payer: COMMERCIAL

## 2024-10-26 DIAGNOSIS — R71.8 HIGH SERUM ERYTHROPOIETIN: ICD-10-CM

## 2024-10-26 DIAGNOSIS — D75.1 POLYCYTHEMIA: ICD-10-CM

## 2024-10-26 DIAGNOSIS — G40.909 SEIZURE DISORDER (HCC): ICD-10-CM

## 2024-10-26 LAB
ALBUMIN SERPL BCG-MCNC: 4.2 G/DL (ref 3.5–5)
ALP SERPL-CCNC: 78 U/L (ref 34–104)
ALT SERPL W P-5'-P-CCNC: 19 U/L (ref 7–52)
ANION GAP SERPL CALCULATED.3IONS-SCNC: 7 MMOL/L (ref 4–13)
AST SERPL W P-5'-P-CCNC: 19 U/L (ref 13–39)
BASOPHILS # BLD AUTO: 0.03 THOUSANDS/ΜL (ref 0–0.1)
BASOPHILS NFR BLD AUTO: 1 % (ref 0–1)
BILIRUB SERPL-MCNC: 0.36 MG/DL (ref 0.2–1)
BUN SERPL-MCNC: 11 MG/DL (ref 5–25)
CALCIUM SERPL-MCNC: 9.9 MG/DL (ref 8.4–10.2)
CHLORIDE SERPL-SCNC: 109 MMOL/L (ref 96–108)
CO2 SERPL-SCNC: 25 MMOL/L (ref 21–32)
CREAT SERPL-MCNC: 0.77 MG/DL (ref 0.6–1.3)
EOSINOPHIL # BLD AUTO: 0.13 THOUSAND/ΜL (ref 0–0.61)
EOSINOPHIL NFR BLD AUTO: 2 % (ref 0–6)
ERYTHROCYTE [DISTWIDTH] IN BLOOD BY AUTOMATED COUNT: 13.1 % (ref 11.6–15.1)
FERRITIN SERPL-MCNC: 20 NG/ML (ref 11–307)
GAS + CO PNL BLDA: 7.6 % (ref 0–1.5)
GFR SERPL CREATININE-BSD FRML MDRD: 87 ML/MIN/1.73SQ M
GLUCOSE P FAST SERPL-MCNC: 89 MG/DL (ref 65–99)
HCT VFR BLD AUTO: 48.1 % (ref 34.8–46.1)
HGB BLD-MCNC: 16.8 G/DL (ref 11.5–15.4)
IMM GRANULOCYTES # BLD AUTO: 0.01 THOUSAND/UL (ref 0–0.2)
IMM GRANULOCYTES NFR BLD AUTO: 0 % (ref 0–2)
IRON SATN MFR SERPL: 21 % (ref 15–50)
IRON SERPL-MCNC: 82 UG/DL (ref 50–212)
LYMPHOCYTES # BLD AUTO: 2 THOUSANDS/ΜL (ref 0.6–4.47)
LYMPHOCYTES NFR BLD AUTO: 32 % (ref 14–44)
MCH RBC QN AUTO: 37 PG (ref 26.8–34.3)
MCHC RBC AUTO-ENTMCNC: 34.9 G/DL (ref 31.4–37.4)
MCV RBC AUTO: 106 FL (ref 82–98)
MONOCYTES # BLD AUTO: 0.55 THOUSAND/ΜL (ref 0.17–1.22)
MONOCYTES NFR BLD AUTO: 9 % (ref 4–12)
NEUTROPHILS # BLD AUTO: 3.54 THOUSANDS/ΜL (ref 1.85–7.62)
NEUTS SEG NFR BLD AUTO: 56 % (ref 43–75)
NRBC BLD AUTO-RTO: 0 /100 WBCS
PLATELET # BLD AUTO: 229 THOUSANDS/UL (ref 149–390)
PMV BLD AUTO: 9.5 FL (ref 8.9–12.7)
POTASSIUM SERPL-SCNC: 4.1 MMOL/L (ref 3.5–5.3)
PROT SERPL-MCNC: 7.3 G/DL (ref 6.4–8.4)
RBC # BLD AUTO: 4.54 MILLION/UL (ref 3.81–5.12)
SODIUM SERPL-SCNC: 141 MMOL/L (ref 135–147)
TIBC SERPL-MCNC: 390 UG/DL (ref 250–450)
UIBC SERPL-MCNC: 308 UG/DL (ref 155–355)
WBC # BLD AUTO: 6.26 THOUSAND/UL (ref 4.31–10.16)

## 2024-10-26 PROCEDURE — 82728 ASSAY OF FERRITIN: CPT

## 2024-10-26 PROCEDURE — 83540 ASSAY OF IRON: CPT

## 2024-10-26 PROCEDURE — 80053 COMPREHEN METABOLIC PANEL: CPT

## 2024-10-26 PROCEDURE — 82668 ASSAY OF ERYTHROPOIETIN: CPT

## 2024-10-26 PROCEDURE — 80201 ASSAY OF TOPIRAMATE: CPT

## 2024-10-26 PROCEDURE — 36415 COLL VENOUS BLD VENIPUNCTURE: CPT

## 2024-10-26 PROCEDURE — 85025 COMPLETE CBC W/AUTO DIFF WBC: CPT

## 2024-10-26 PROCEDURE — 82375 ASSAY CARBOXYHB QUANT: CPT

## 2024-10-26 PROCEDURE — 83550 IRON BINDING TEST: CPT

## 2024-10-28 ENCOUNTER — OFFICE VISIT (OUTPATIENT)
Dept: HEMATOLOGY ONCOLOGY | Facility: CLINIC | Age: 54
End: 2024-10-28
Payer: COMMERCIAL

## 2024-10-28 VITALS
HEIGHT: 63 IN | RESPIRATION RATE: 18 BRPM | BODY MASS INDEX: 36.68 KG/M2 | HEART RATE: 88 BPM | TEMPERATURE: 97.8 F | DIASTOLIC BLOOD PRESSURE: 80 MMHG | OXYGEN SATURATION: 99 % | WEIGHT: 207 LBS | SYSTOLIC BLOOD PRESSURE: 126 MMHG

## 2024-10-28 DIAGNOSIS — F17.210 SMOKING GREATER THAN 20 PACK YEARS: ICD-10-CM

## 2024-10-28 DIAGNOSIS — Z12.11 ENCOUNTER FOR SCREENING COLONOSCOPY: ICD-10-CM

## 2024-10-28 DIAGNOSIS — D75.1 POLYCYTHEMIA: Primary | ICD-10-CM

## 2024-10-28 PROCEDURE — 99214 OFFICE O/P EST MOD 30 MIN: CPT | Performed by: INTERNAL MEDICINE

## 2024-10-28 PROCEDURE — G2211 COMPLEX E/M VISIT ADD ON: HCPCS | Performed by: INTERNAL MEDICINE

## 2024-10-28 RX ORDER — NICOTINE 21 MG/24HR
1 PATCH, TRANSDERMAL 24 HOURS TRANSDERMAL DAILY
COMMUNITY
Start: 2024-07-22

## 2024-10-28 RX ORDER — DIAZEPAM 5 MG/1
TABLET ORAL EVERY 24 HOURS
COMMUNITY
Start: 2024-09-13

## 2024-10-28 NOTE — PROGRESS NOTES
Hematology/Oncology Outpatient Follow-up  Shruthi Kaur 54 y.o. female 1970 [id number]    Date:  10/28/2024    Assessment and Plan:  1. Polycythemia  She continues to have elevated hemoglobin/hematocrit which was investigated in the past extensively including a bone marrow biopsy in 2019 which came back negative for JAK2 mutation or obvious myeloproliferative disorder.  Her polycythemia is most likely familial since she has other family members with the same problem for erythrocytosis.  She has also has smoker which most likely is also contributing factor.  I did encourage her to consider smoking cessation.  She was also asked to continue with baby aspirin daily.  She is a candidate for low-dose CT scan of the lung for screening since she is a smoker.    - CBC and differential; Future  - Comprehensive metabolic panel; Future  - Magnesium; Future  - Carboxyhemoglobin; Future  - Erythropoietin; Future    2. Encounter for screening colonoscopy  We did discuss screening colonoscopy.  She stated that her last colonoscopy was around  which showed benign polyps.    - Ambulatory referral to Gastroenterology; Future      HPI:  She is a 54-year-old female with strong positive family history for clotting disorder.  The patient has a history of seizure disorder, arthritis, migraine headaches, history of colon polyps, her last colonoscopy was in .  Her family history is positive for polycythemia/erythrocytosis in her mother and maternal aunt.  Her brother  at age 46 due to deep vein thrombosis/pulmonary embolism after a car trip.  Her sister also had a diagnosis of DVT at age 47 after also a car trip.  The patient seemed very concerned about her strong family history for hypercoagulability and asked to get extensive workup done for that particular reason.  The hypercoagulable workup came back entirely normal with out any hint of prothrombin gene mutation , her factor 5 Leiden studie was normal.  She also has a  positive history for polycythemia most likely since childhood with positive family history for the same problem as stated above.     The patient had extensive workup for her polycythemia including a negative JAK2 mutation study.  She also had a bone marrow biopsy on the 20/6 of February 2019 which showed mildly hypercellular bone marrow with mild erythroid hyperplasia without significant dysplasia.  The cytogenetic studies and flow cytometry came back all normal.  The blast count was within normal range          Interval history:  The patient came today for a follow-up visit after lengthy interruption of care.  Her last visit was in June 2022.  Recent available blood work on 10/26/2024 showed hemoglobin of 16.8 with hematocrit of 48.1%.  .  White cell count was 6.2 with normal platelets.  White cell differential was normal.  Creatinine 0.7 with normal calcium and liver enzymes.  The patient had ultrasound of the abdomen on 9/19/2024 which was read as normal.  No organomegaly was noted.  She seems to have abnormal right mammogram which was investigated with diagnostic mammogram and ultrasound of the right breast.  Follow-up imaging in 3 months was recommended.    ROS: Review of Systems   Constitutional:  Positive for fatigue. Negative for chills and fever.   HENT:  Positive for mouth sores. Negative for ear pain and sore throat.    Eyes:  Negative for pain and visual disturbance.   Respiratory:  Negative for cough and shortness of breath.    Cardiovascular:  Negative for chest pain and palpitations.   Gastrointestinal:  Positive for constipation. Negative for abdominal pain and vomiting.   Genitourinary:  Positive for dysuria. Negative for hematuria.   Musculoskeletal:  Positive for back pain. Negative for arthralgias.   Skin:  Negative for color change and rash.   Neurological:  Positive for numbness. Negative for seizures and syncope.   Psychiatric/Behavioral:  Positive for sleep disturbance.    All other  "systems reviewed and are negative.      Past Medical History:   Diagnosis Date    Diverticulosis     Hiatal hernia     Memory loss 2107    Due to Topirimate    Migraine     Migraines     Osteopenia     Peptic ulcer     Polycythemia     PVCs (premature ventricular contractions)     Seizure disorder (HCC)     Seizures (HCC) 2000       Past Surgical History:   Procedure Laterality Date    APPENDECTOMY      \"1990's\"    BONE MARROW BIOPSY W/ ASPIRATION  03/2019    COLONOSCOPY  2011    HYSTERECTOMY      IR BIOPSY BONE MARROW  2/26/2019    NECK SURGERY Right     resection of a mass from the posterior neck     SHOULDER SURGERY Right     2 surgeries       Social History     Socioeconomic History    Marital status: /Civil Union     Spouse name: None    Number of children: None    Years of education: None    Highest education level: None   Occupational History    None   Tobacco Use    Smoking status: Every Day     Current packs/day: 0.50     Average packs/day: 0.5 packs/day for 35.0 years (17.5 ttl pk-yrs)     Types: Cigarettes     Passive exposure: Current (spouse smokes as well)    Smokeless tobacco: Never    Tobacco comments:     Cut back   Vaping Use    Vaping status: Never Used   Substance and Sexual Activity    Alcohol use: No    Drug use: Never    Sexual activity: Yes     Partners: Male     Birth control/protection: None     Comment: Withdrawal method with spouse plus ERYN   Other Topics Concern    None   Social History Narrative    None     Social Determinants of Health     Financial Resource Strain: Not on file   Food Insecurity: Not on file   Transportation Needs: Not on file   Physical Activity: Not on file   Stress: Not on file   Social Connections: Not on file   Intimate Partner Violence: Not on file   Housing Stability: Not on file       Family History   Problem Relation Age of Onset    Hypertension Family     Migraines Family     Hypertension Father     Clotting disorder Brother     Neuropathy Brother     " Ovarian cancer Maternal Grandmother 65    Cancer Maternal Grandmother         Liver, uterine    Breast cancer Paternal Aunt     Breast cancer Cousin 51    Breast cancer Cousin 45    Breast cancer Cousin 46    Clotting disorder Mother         Erythrocytosis    Clotting disorder Sister         Thrombosis    Clotting disorder Brother         Pulmonary Embolism  ( from Embolism 11/10/17)    Diabetes Brother          (2020)    Hypertension Brother         Decreased (2020)       Allergies   Allergen Reactions    Amitriptyline Other (See Comments)     confusion    Propranolol Other (See Comments)     Other reaction(s): Other (See Comments)  Distension abdominal         Current Outpatient Medications:     aspirin (ECOTRIN LOW STRENGTH) 81 mg EC tablet, Take 81 mg by mouth, Disp: , Rfl:     butalbital-acetaminophen-caffeine (FIORICET,ESGIC) -40 mg per tablet, TAKE 1 TABLET BY MOUTH FOR MIGRAINE. MAY REPEAT IN 4 HRS IF NEEDED. LIMIT 2TABS/24 HRS. 30 DS., Disp: 15 tablet, Rfl: 0    Calcium Carbonate-Vitamin D3 600-400 MG-UNIT TABS, Take 1 tablet by mouth 2 (two) times a day , Disp: , Rfl:     CeleBREX 200 MG capsule, Take 200 mg by mouth daily, Disp: , Rfl:     gabapentin (NEURONTIN) 100 mg capsule, TAKE 3 CAPS AT BEDTIME ( IN ADDITION TO 600MG AT BEDTIME), Disp: 270 capsule, Rfl: 1    gabapentin (NEURONTIN) 300 mg capsule, TAKE 1 CAPSULE (300 MG) IN THE MORNING AND 2 CAPSULES AT NIGHT, Disp: 270 capsule, Rfl: 0    HYDROcodone-acetaminophen (NORCO) 7.5-325 mg per tablet, , Disp: , Rfl: 0    ketorolac (TORADOL) 10 mg tablet, Take 1 tablet (10 mg total) by mouth every 6 (six) hours as needed (migraine) Max 2-3 per week., Disp: 12 tablet, Rfl: 0    latanoprost (XALATAN) 0.005 % ophthalmic solution, INSTILL 1 DROP INTO BOTH EYES AT BEDTIME, Disp: , Rfl:     Linzess 145 MCG CAPS, TAKE 1 (ONE) CAPSULE BY MOUTH BEFORE BREAKFAST, Disp: , Rfl:     morphine (MS CONTIN) 15 mg 12 hr tablet, , Disp: , Rfl:  "    omeprazole (PriLOSEC) 40 MG capsule, Take 40 mg by mouth 2 (two) times a day, Disp: , Rfl:     rosuvastatin (CRESTOR) 10 MG tablet, Take 10 mg by mouth daily, Disp: , Rfl:     tiZANidine (ZANAFLEX) 4 mg tablet, Take 4 mg by mouth daily at bedtime as needed, Disp: , Rfl:     topiramate (TOPAMAX) 200 MG tablet, Take 1 tablet (200 mg total) by mouth 2 (two) times a day, Disp: 180 tablet, Rfl: 3    triamcinolone (KENALOG) 0.1 % cream, Apply BID for up to 2 weeks to affected skin on neck down prn flares then take one week break and can repeat regimen prn flares. Do not apply to groin, skin folds, face., Disp: 45 g, Rfl: 2    diazepam (VALIUM) 5 mg tablet, every 24 hours (Patient not taking: Reported on 10/28/2024), Disp: , Rfl:     nicotine (Nicoderm CQ) 21 mg/24 hr TD 24 hr patch, Place 1 patch on the skin Daily (Patient not taking: Reported on 10/28/2024), Disp: , Rfl:     Current Facility-Administered Medications:     bupivacaine (PF) (MARCAINE) 0.25 % injection 2.5 mL, 2.5 mL, Subcutaneous, Once, Lizeth Kamara PA-C    lidocaine (XYLOCAINE) 1 % injection 2.5 mL, 2.5 mL, Injection, Once, Lizeth Kamara PA-C      Physical Exam:  /80 (BP Location: Right arm, Patient Position: Sitting, Cuff Size: Adult)   Pulse 88   Temp 97.8 °F (36.6 °C)   Resp 18   Ht 5' 3\" (1.6 m)   Wt 93.9 kg (207 lb)   SpO2 99%   BMI 36.67 kg/m²     Physical Exam  Constitutional:       General: She is not in acute distress.     Appearance: She is well-developed. She is not diaphoretic.   HENT:      Head: Normocephalic and atraumatic.      Nose: Nose normal.   Eyes:      General: No scleral icterus.        Right eye: No discharge.         Left eye: No discharge.      Conjunctiva/sclera: Conjunctivae normal.      Pupils: Pupils are equal, round, and reactive to light.   Neck:      Thyroid: No thyromegaly.      Vascular: No JVD.      Trachea: No tracheal deviation.   Cardiovascular:      Rate and Rhythm: Normal rate and regular " rhythm.      Heart sounds: Normal heart sounds. No murmur heard.     No friction rub.   Pulmonary:      Effort: Pulmonary effort is normal. No respiratory distress.      Breath sounds: Normal breath sounds. No stridor. No wheezing or rales.   Chest:      Chest wall: No tenderness.   Abdominal:      General: There is no distension.      Palpations: Abdomen is soft. There is no hepatomegaly or splenomegaly.      Tenderness: There is no abdominal tenderness. There is no guarding or rebound.   Musculoskeletal:         General: No tenderness or deformity. Normal range of motion.      Cervical back: Normal range of motion and neck supple.   Lymphadenopathy:      Cervical: No cervical adenopathy.   Skin:     General: Skin is warm and dry.      Coloration: Skin is not pale.      Findings: No erythema or rash.   Neurological:      Mental Status: She is alert and oriented to person, place, and time.      Cranial Nerves: No cranial nerve deficit.      Coordination: Coordination normal.      Deep Tendon Reflexes: Reflexes are normal and symmetric.   Psychiatric:         Behavior: Behavior normal.         Thought Content: Thought content normal.         Judgment: Judgment normal.           Labs:  Lab Results   Component Value Date    WBC 6.26 10/26/2024    HGB 16.8 (H) 10/26/2024    HCT 48.1 (H) 10/26/2024     (H) 10/26/2024     10/26/2024     Lab Results   Component Value Date    K 4.1 10/26/2024     (H) 10/26/2024    CO2 25 10/26/2024    BUN 11 10/26/2024    CREATININE 0.77 10/26/2024    GLUF 89 10/26/2024    CALCIUM 9.9 10/26/2024    AST 19 10/26/2024    ALT 19 10/26/2024    ALKPHOS 78 10/26/2024    EGFR 87 10/26/2024       Patient voiced understanding and agreement in the above discussion. Aware to contact our office with questions/symptoms in the interim.

## 2024-10-29 LAB
EPO SERPL-ACNC: 14.9 MIU/ML (ref 2.6–18.5)
TOPIRAMATE SERPL-MCNC: 11.8 UG/ML (ref 2–25)

## 2024-11-01 ENCOUNTER — PATIENT MESSAGE (OUTPATIENT)
Dept: NEUROLOGY | Facility: CLINIC | Age: 54
End: 2024-11-01

## 2024-11-01 DIAGNOSIS — G43.009 MIGRAINE WITHOUT AURA AND WITHOUT STATUS MIGRAINOSUS, NOT INTRACTABLE: ICD-10-CM

## 2024-11-01 NOTE — TELEPHONE ENCOUNTER
Patient requesting Refill of Toradol 10 mg tab sent to her [facility] [employer].     LOV 6/11/24 Lizeth Kamara PA-C  Next OV 2/4/25 Lizeth Kamara PA-C      Routed to provider to send if agreeable.

## 2024-11-04 ENCOUNTER — TELEPHONE (OUTPATIENT)
Dept: NEUROLOGY | Facility: CLINIC | Age: 54
End: 2024-11-04

## 2024-11-04 RX ORDER — KETOROLAC TROMETHAMINE 10 MG/1
10 TABLET, FILM COATED ORAL EVERY 6 HOURS PRN
Qty: 12 TABLET | Refills: 0 | Status: SHIPPED | OUTPATIENT
Start: 2024-11-04

## 2024-11-04 NOTE — TELEPHONE ENCOUNTER
Called and spoke to pt - offered her appt tomorrow 11/5 with Lizeth Kamara @ 9am due to being on the wait list. Pt declined due to back pain. Informed her I will keep her on wait list for future openings

## 2024-11-27 DIAGNOSIS — G43.009 MIGRAINE WITHOUT AURA AND WITHOUT STATUS MIGRAINOSUS, NOT INTRACTABLE: ICD-10-CM

## 2024-11-29 RX ORDER — KETOROLAC TROMETHAMINE 10 MG/1
10 TABLET, FILM COATED ORAL EVERY 6 HOURS PRN
Qty: 12 TABLET | Refills: 0 | Status: SHIPPED | OUTPATIENT
Start: 2024-11-29

## 2024-11-29 RX ORDER — BUTALBITAL, ACETAMINOPHEN AND CAFFEINE 50; 325; 40 MG/1; MG/1; MG/1
TABLET ORAL
Qty: 15 TABLET | Refills: 0 | Status: SHIPPED | OUTPATIENT
Start: 2024-11-29

## 2024-11-29 NOTE — TELEPHONE ENCOUNTER
Medication: Vincent   PDMP  11/09/2024 11/07/2024 Morphine Sulfate (Tablet, Extended Release) 60.0 30 15 MG 30.0 [facility] Medicare 0 / 0 PA   1 [id number] 11/09/2024 11/07/2024 HYDROcodone BITARTRATE 7.5 MG ORAL TABLET/ACETAMINOPHEN 325 MG (Tablet) 120.0 30 325 MG-7.5 MG 30.0 [facility] Medicare 0 / 0 PA   1 [id number] 11/07/2024 11/07/2024 diazePAM (Tablet) 3.0 3 5 MG NA [facility] Medicare 0 / 0 PA   1 [id number] 10/30/2024 09/19/2024 Butalbital-acetaminophen-caffeine (Tablet) 15.0 30 325 MG-50 MG-40 MG NA [facility] Medicare 0 / 0 PA   1 [id number] 10/12/2024 10/11/2024 Morphine Sulfate (Tablet, Extended Release) 60.0 30 15 MG 30.0 [id number] Medicare 0 / 0 PA   Active agreement on file -No    Refill must be reviewed and completed by the office or provider. The refill is unable to be approved or denied by the medication management team.

## 2024-12-10 ENCOUNTER — PATIENT MESSAGE (OUTPATIENT)
Dept: NEUROLOGY | Facility: CLINIC | Age: 54
End: 2024-12-10

## 2024-12-13 ENCOUNTER — TELEPHONE (OUTPATIENT)
Age: 54
End: 2024-12-13

## 2024-12-13 DIAGNOSIS — G43.009 MIGRAINE WITHOUT AURA AND WITHOUT STATUS MIGRAINOSUS, NOT INTRACTABLE: ICD-10-CM

## 2024-12-13 RX ORDER — KETOROLAC TROMETHAMINE 10 MG/1
10 TABLET, FILM COATED ORAL EVERY 6 HOURS PRN
Qty: 12 TABLET | Refills: 3 | Status: CANCELLED | OUTPATIENT
Start: 2024-12-13

## 2024-12-13 RX ORDER — BUTALBITAL, ACETAMINOPHEN AND CAFFEINE 50; 325; 40 MG/1; MG/1; MG/1
TABLET ORAL
Qty: 15 TABLET | Refills: 3 | Status: CANCELLED | OUTPATIENT
Start: 2024-12-13

## 2024-12-13 NOTE — TELEPHONE ENCOUNTER
All Conversations: Tier Exemprion and/or Prior Auth for 2025  (Oldest Message First)December 10, 2024  Shruthi Kaur to [facility] (supporting Lizeth Kamara PA-C)         12/10/24 10:23 AM  Good Morning Dr. Kamara:     Hope all is well. I was just reaching out to see if someone would do Tier Exception and/or Prior Auth for my medications Butalbutal  and Ketorilac for year 2025?  The Tier Exception is to attempt to lower the cost for the medication.      I know that every year it has to be done. I'm not sure if the Gabapentin 300 mg and 100 mg or Topirimate 200mg has to be done, but if so I was just giving a heads up so when I have to refill in Jan 2025 I won't have any Issues.      I also notice there were no refills on the Butalbutal or Ketorilac for December (Due EOM) or January. My appt with you is scheduled for February 4, 2024.      Thank you kindly,     Shruthi

## 2024-12-13 NOTE — TELEPHONE ENCOUNTER
See "[employer] message from 12/10 for more info.    Last script for ketorolac and butalbital sent on 11/29 with no refills,    Scripts entered with 3 additional refills.  Please sign off if agreeable

## 2024-12-13 NOTE — TELEPHONE ENCOUNTER
Called [facility] medicare at [phone] and spoke to Maria Luisa.  Call was disconnected when I was giving info regarding medications.     Called [facility] medicare again and spoke to Luis.     Butalbital/APAP/caff -is not covered by plan and can request for this med to be covered but need to speak to a different dept to complete PA's   Gabapentin 300mg-needs PA  Gabapentin 100mg-needs PA    Ketorolac-does not need PA  Topiramate 200mg -does not need PA    Call then transferred to PA dept ([facility]) and spoke to samuel AQUINO  States that gabapentin 300mg and 100mg does not need PA    Butalbital/APAP/caff does need PA  PA completed over the phone for 2025  Will received determination within 72 hours  Case #-[id number]    Tried and failed Imitrex nasal spray, injection and p.o.  Rizatriptan, Excedrin Migraine,Nurtec, decadron    Will send refills for ketorolac and butalbital in separate encounter

## 2024-12-19 ENCOUNTER — PATIENT MESSAGE (OUTPATIENT)
Dept: NEUROLOGY | Facility: CLINIC | Age: 54
End: 2024-12-19

## 2024-12-19 DIAGNOSIS — G43.009 MIGRAINE WITHOUT AURA AND WITHOUT STATUS MIGRAINOSUS, NOT INTRACTABLE: ICD-10-CM

## 2024-12-19 NOTE — TELEPHONE ENCOUNTER
Called [facility] at  [phone], spoke to Shruthi. I was transferred to [phone]. Spoke to Willie and states that Butalbital/APAP/caff has been approved through 12/31/25.     Pt made aware via [employer]

## 2024-12-23 NOTE — PATIENT COMMUNICATION
Called PA dept spoke to Day. I was transferred to [facility] at [phone]. Spoke to Nelida. Tier exception for Butalbital-acetaminophen-caffeine -40 mg per tablet for 2025 initiated over the phone. Case #[id number].   72 hr turnaround time.    Tier exception for ketorolac (2025) initiated over the phone.   Case # [id number]    72 turnaround time     Awaiting determination

## 2024-12-26 RX ORDER — BUTALBITAL, ACETAMINOPHEN AND CAFFEINE 50; 325; 40 MG/1; MG/1; MG/1
TABLET ORAL
Qty: 15 TABLET | Refills: 3 | Status: SHIPPED | OUTPATIENT
Start: 2024-12-26

## 2024-12-26 RX ORDER — KETOROLAC TROMETHAMINE 10 MG/1
10 TABLET, FILM COATED ORAL EVERY 6 HOURS PRN
Qty: 12 TABLET | Refills: 3 | Status: SHIPPED | OUTPATIENT
Start: 2024-12-26

## 2024-12-26 NOTE — PATIENT COMMUNICATION
Determinations for tier exceptions not received yet.      Refills for ketorolac and butalbital entered,   Scripts last sent to pharm on 11/29.  I added 3 refills, please sign off if agreeable

## 2024-12-27 ENCOUNTER — TELEPHONE (OUTPATIENT)
Dept: NEUROLOGY | Facility: CLINIC | Age: 54
End: 2024-12-27

## 2024-12-31 NOTE — PATIENT COMMUNICATION
Called [facility] at [phone] to check status. Spoke to Trevon and states Ketorolac tier exception approved through 12/31/2025. This med is now tier 1.   Butalbital-acetaminophen-caffeine was cancelled bec this med is tier 5, not on their formulary list and it is not eligible for tier exception.   She will fax a copy the denial and ketorolac approval to [phone].

## 2025-01-10 DIAGNOSIS — L65.9 ALOPECIA: ICD-10-CM

## 2025-01-10 RX ORDER — TRIAMCINOLONE ACETONIDE 1 MG/G
CREAM TOPICAL
Qty: 45 G | Refills: 0 | Status: SHIPPED | OUTPATIENT
Start: 2025-01-10

## 2025-01-28 ENCOUNTER — TELEPHONE (OUTPATIENT)
Age: 55
End: 2025-01-28

## 2025-01-28 NOTE — TELEPHONE ENCOUNTER
Pt calling in to report to Dr. Adam that she recently had an EGD on 1/16 with her gastroenterologist through [facility] and did not express concerns with her having a colonoscopy at this time. Pt last had a colonoscopy on 4/27/23. Pt wanted to inform Dr. Adam that she does follow up with a gastroenterologist through [facility] since a referral was placed for gastroenterology through [facility]

## 2025-01-28 NOTE — TELEPHONE ENCOUNTER
Patient returning call from yesterday regarding scheduling procedure.  Referral from Dr. Adam in place.  Patient uses [facility] and would like to continue to use them.  Patient will call Dr. Adam's office to straighten things out.

## 2025-01-31 DIAGNOSIS — L65.9 ALOPECIA: ICD-10-CM

## 2025-01-31 RX ORDER — TRIAMCINOLONE ACETONIDE 1 MG/G
CREAM TOPICAL
Qty: 45 G | Refills: 0 | Status: SHIPPED | OUTPATIENT
Start: 2025-01-31

## 2025-02-04 ENCOUNTER — OFFICE VISIT (OUTPATIENT)
Dept: NEUROLOGY | Facility: CLINIC | Age: 55
End: 2025-02-04
Payer: COMMERCIAL

## 2025-02-04 VITALS
DIASTOLIC BLOOD PRESSURE: 93 MMHG | HEIGHT: 63 IN | SYSTOLIC BLOOD PRESSURE: 137 MMHG | WEIGHT: 203 LBS | HEART RATE: 91 BPM | BODY MASS INDEX: 35.97 KG/M2

## 2025-02-04 DIAGNOSIS — G43.009 MIGRAINE WITHOUT AURA AND WITHOUT STATUS MIGRAINOSUS, NOT INTRACTABLE: Primary | ICD-10-CM

## 2025-02-04 DIAGNOSIS — G40.909 SEIZURE DISORDER (HCC): ICD-10-CM

## 2025-02-04 DIAGNOSIS — M79.18 MYOFASCIAL MUSCLE PAIN: ICD-10-CM

## 2025-02-04 DIAGNOSIS — M54.81 OCCIPITAL NEURALGIA OF RIGHT SIDE: ICD-10-CM

## 2025-02-04 PROCEDURE — 99214 OFFICE O/P EST MOD 30 MIN: CPT | Performed by: PHYSICIAN ASSISTANT

## 2025-02-04 PROCEDURE — G2211 COMPLEX E/M VISIT ADD ON: HCPCS | Performed by: PHYSICIAN ASSISTANT

## 2025-02-04 RX ORDER — LINACLOTIDE 290 UG/1
290 CAPSULE, GELATIN COATED ORAL DAILY
COMMUNITY
Start: 2024-08-23
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
## 2025-02-04 NOTE — PATIENT INSTRUCTIONS
Will order transdermal therapeutic pain cream for neck.  Nerivio device- google this for migraine prevention, needs script  [email or web address]- can search for therapist

## 2025-02-06 DIAGNOSIS — G43.009 MIGRAINE WITHOUT AURA AND WITHOUT STATUS MIGRAINOSUS, NOT INTRACTABLE: ICD-10-CM

## 2025-02-06 DIAGNOSIS — M54.81 OCCIPITAL NEURALGIA OF RIGHT SIDE: ICD-10-CM

## 2025-02-06 DIAGNOSIS — M79.18 MYOFASCIAL MUSCLE PAIN: ICD-10-CM

## 2025-02-06 RX ORDER — GABAPENTIN 300 MG/1
CAPSULE ORAL
Qty: 270 CAPSULE | Refills: 1 | Status: SHIPPED | OUTPATIENT
Start: 2025-02-06

## 2025-02-06 RX ORDER — GABAPENTIN 100 MG/1
CAPSULE ORAL
Qty: 270 CAPSULE | Refills: 1 | Status: SHIPPED | OUTPATIENT
Start: 2025-02-06

## 2025-02-07 NOTE — TELEPHONE ENCOUNTER
Inbound call received from patient.    Pt wanted to know if Gabapentin needed a PA prior to this year. Reviewed chart and did not see any PA for Gabapentin. Pt stated that her 90 day supply of Gabapentin is now $29 when it used to be $4 back in December. Advised pt to call her insurance to see if the tiers for medication changed since it is a new year and/or call the pharmacy to see if a PA is needed.   Pt stated she will send a [employer] message with the information.

## 2025-02-28 DIAGNOSIS — L65.9 ALOPECIA: ICD-10-CM

## 2025-02-28 RX ORDER — TRIAMCINOLONE ACETONIDE 1 MG/G
CREAM TOPICAL
Qty: 45 G | Refills: 0 | Status: SHIPPED | OUTPATIENT
Start: 2025-02-28

## 2025-03-23 DIAGNOSIS — L65.9 ALOPECIA: ICD-10-CM

## 2025-03-24 RX ORDER — TRIAMCINOLONE ACETONIDE 1 MG/G
CREAM TOPICAL
Qty: 45 G | Refills: 1 | Status: SHIPPED | OUTPATIENT
Start: 2025-03-24

## 2025-04-06 NOTE — ASSESSMENT & PLAN NOTE
She has migraine headaches associated with neck pain, possible occipital neuralgia on the right side which prevents her from laying on the side.  Continue gabapentin for this.    For migraine prevention continue Topamax 200 mg every 12 hours, however also use for seizures..  Prior documentation: The patient was recently diagnosed with open-angle glaucoma and she uses drops from her ophthalmologist she follows up with an ophthalmologist every 6 months.  It was noted that if there is any progression of pressure changes related to the glaucoma we will consider weaning Topamax but for now benefits outweigh risks.  Her ophthalmologist is aware of the Topamax and is okay with that as well.    As needed continue Fioricet or Toradol if that fails.    Consider Nerivio device, needs prescription.

## 2025-04-08 DIAGNOSIS — G43.009 MIGRAINE WITHOUT AURA AND WITHOUT STATUS MIGRAINOSUS, NOT INTRACTABLE: ICD-10-CM

## 2025-04-08 RX ORDER — KETOROLAC TROMETHAMINE 10 MG/1
10 TABLET, FILM COATED ORAL EVERY 6 HOURS PRN
Qty: 12 TABLET | Refills: 4 | Status: SHIPPED | OUTPATIENT
Start: 2025-04-08

## 2025-04-10 ENCOUNTER — TELEPHONE (OUTPATIENT)
Dept: NEUROLOGY | Facility: CLINIC | Age: 55
End: 2025-04-10

## 2025-04-15 DIAGNOSIS — G43.009 MIGRAINE WITHOUT AURA AND WITHOUT STATUS MIGRAINOSUS, NOT INTRACTABLE: ICD-10-CM

## 2025-04-15 DIAGNOSIS — G40.909 SEIZURE DISORDER (HCC): ICD-10-CM

## 2025-04-15 RX ORDER — TOPIRAMATE 200 MG/1
200 TABLET, FILM COATED ORAL 2 TIMES DAILY
Qty: 180 TABLET | Refills: 0 | Status: SHIPPED | OUTPATIENT
Start: 2025-04-15

## 2025-04-15 RX ORDER — BUTALBITAL, ACETAMINOPHEN AND CAFFEINE 50; 325; 40 MG/1; MG/1; MG/1
TABLET ORAL
Qty: 15 TABLET | Refills: 5 | Status: SHIPPED | OUTPATIENT
Start: 2025-04-15

## 2025-04-15 NOTE — TELEPHONE ENCOUNTER
Patient comment: Not due to be filled until May 10th. My appt isn't until August 5th so need enough refills until I'm seen.      Patient Id Prescription # Filled Written Drug Label Qty Days Strength MME** Prescriber Pharmacy Payment REFILL #/Auth State Detail  1 [id number] 04/03/2025 12/26/2024 Butalbital-acetaminophen-caffeine (Tablet) 15.0 8 325 MG-50 MG-40 MG NA [facility] Medicare 3 / 3 PA   1 [id number] 04/02/2025 04/02/2025 HYDROcodone BITARTRATE 7.5 MG ORAL TABLET/ACETAMINOPHEN 325 MG (Tablet) 120.0 30 325 MG-7.5 MG 30.0 [id number] Medicare 0 / 0 PA   1 [id number] 04/02/2025 04/02/2025 Morphine Sulfate (Tablet, Extended Release) 60.0 30 15 MG 30.0 [id number] Medicare 0 / 0 PA   1 [id number] 03/04/2025 03/03/2025 HYDROcodone BITARTRATE 7.5 MG ORAL TABLET/ACETAMINOPHEN 325 MG (Tablet) 120.0 30 325 MG-7.5 MG 30.0 [id number] Medicare 0 / 0 PA   1 [id number] 03/04/2025 03/03/2025 Morphine Sulfate (Tablet, Extended Release) 60.0 30 15 MG 30.0 [id number] Medicare 0 / 0 PA   1 [id number] 03/03/2025 03/03/2025 diazePAM (Tablet) 3.0 1 5 MG NA [id number] Medicare 0 / 0 PA   1 [id number] 02/24/2025 12/26/2024 Butalbital-acetaminophen-caffeine (Tablet) 15.0 8 325 MG-50 MG-40 MG NA [facility] Medicare 2 / 3 PA   1 [id number] 02/05/2025 02/03/2025 Morphine Sulfate (Tablet, Extended Release) 60.0 30 15 MG 30.0 [id number] Medicare 0 / 0 PA   1 [id number] 02/05/2025 02/03/2025 HYDROcodone BITARTRATE 7.5 MG ORAL TABLET/ACETAMINOPHEN 325 MG (Tablet) 120.0 30 325 MG-7.5 MG 30.0 [id number] Medicare 0 / 0 PA   1 [id number] 01/23/2025 12/26/2024 Butalbital-acetaminophen-caffeine (Tablet) 15.0 8 325 MG-50 MG-40 MG NA [facility] Medicare 1 / 3 PA

## 2025-05-10 DIAGNOSIS — L65.9 ALOPECIA: ICD-10-CM

## 2025-05-12 NOTE — TELEPHONE ENCOUNTER
Pt was seen 08/06/24    Patient to start - Triamcinolone 1% lotion once a day for 30 days    · Discussed 5% minoxidil foam qHS for men over the counter . Counseled patient that they may see some increased shedding within the first few weeks of treatment, which is a normal side effect and will resolve spontaneously. They may also see mild facial hypertrichosis. Educated that treatment should be continuous to maintain efficacy  · Labs ordered ( TSH, Thyroid peroxidase and thyroglobulin)   Note that clinically this appears to be pattern without scaring but historically the patient has experience circular patches of allopecia that resolved spontaneouslyu.  She has not affected areas at this time.  Will start initial topical therapy but  biopsy may be indicated if persistent differential considerations.

## 2025-05-13 RX ORDER — TRIAMCINOLONE ACETONIDE 1 MG/G
CREAM TOPICAL
Qty: 45 G | Refills: 0 | Status: SHIPPED | OUTPATIENT
Start: 2025-05-13

## 2025-06-10 ENCOUNTER — TELEPHONE (OUTPATIENT)
Dept: DERMATOLOGY | Facility: CLINIC | Age: 55
End: 2025-06-10

## 2025-06-10 NOTE — TELEPHONE ENCOUNTER
Received fax from [facility]. Refill request for Triamcinolone 0.1% cream (45 g). Form scanned into chart for approval/signature.

## 2025-06-10 NOTE — TELEPHONE ENCOUNTER
Pt was seen 08/06/24, will have you sign on Thursday          - Patient to start - Triamcinolone 1% lotion once a day for 30 days     Discussed 5% minoxidil foam qHS for men over the counter . Counseled patient that they may see some increased shedding within the first few weeks of treatment, which is a normal side effect and will resolve spontaneously. They may also see mild facial hypertrichosis. Educated that treatment should be continuous to maintain efficacy  Labs ordered ( TSH, Thyroid peroxidase and thyroglobulin)   Note that clinically this appears to be pattern without scaring but historically the patient has experience circular patches of allopecia that resolved spontaneouslyu.  She has not affected areas at this time.  Will start initial topical therapy but  biopsy may be indicated if persistent differential considerations.

## 2025-06-12 NOTE — TELEPHONE ENCOUNTER
Signed script faxed back to [facility]. Confirmation page scanned into chart.

## 2025-06-24 ENCOUNTER — OFFICE VISIT (OUTPATIENT)
Age: 55
End: 2025-06-24
Payer: COMMERCIAL

## 2025-06-24 VITALS — WEIGHT: 199 LBS | TEMPERATURE: 98.3 F | BODY MASS INDEX: 35.25 KG/M2

## 2025-06-24 DIAGNOSIS — L66.9 SCARRING ALOPECIA: Primary | ICD-10-CM

## 2025-06-24 PROCEDURE — 99214 OFFICE O/P EST MOD 30 MIN: CPT | Performed by: REGISTERED NURSE

## 2025-06-24 RX ORDER — CLOBETASOL PROPIONATE 0.5 MG/ML
SOLUTION TOPICAL
Qty: 50 ML | Refills: 3 | Status: SHIPPED | OUTPATIENT
Start: 2025-06-24

## 2025-06-24 RX ORDER — KETOCONAZOLE 20 MG/ML
SHAMPOO, SUSPENSION TOPICAL
Qty: 120 ML | Refills: 2 | Status: SHIPPED | OUTPATIENT
Start: 2025-06-24

## 2025-06-24 NOTE — PROGRESS NOTES
"[facility] Dermatology Clinic Note     Patient Name: Shruthi Kaur  Encounter Date: 06/24/2025       Have you been cared for by a [facility] Dermatologist in the last 3 years and, if so, which description applies to you? Yes. I have been here within the last 3 years, and my medical history has NOT changed since that time. I am of child-bearing potential.     REVIEW OF SYSTEMS:  Have you recently had or currently have any of the following? No changes in my recent health.   PAST MEDICAL HISTORY:  Have you personally ever had or currently have any of the following?  If \"YES,\" then please provide more detail. No changes in my medical history.   HISTORY OF IMMUNOSUPPRESSION: Do you have a history of any of the following:  Systemic Immunosuppression such as Diabetes, Biologic or Immunotherapy, Chemotherapy, Organ Transplantation, Bone Marrow Transplantation or Prednisone?  No     Answering \"YES\" requires the addition of the dotphrase \"IMMUNOSUPPRESSED\" as the first diagnosis of the patient's visit.   FAMILY HISTORY:  Any \"first degree relatives\" (parent, brother, sister, or child) with the following?    No changes in my family's known health.   PATIENT EXPERIENCE:    Do you want the Dermatologist to perform a COMPLETE skin exam today including a clinical examination under the \"bra and underwear\" areas?  NO  If necessary, do we have your permission to call and leave a detailed message on your Preferred Phone number that includes your specific medical information?  Yes      Allergies[1] Current Medications[2]        Whom besides the patient is providing clinical information about today's encounter?   NO ADDITIONAL HISTORIAN (patient alone provided history)    Physical Exam and Assessment/Plan by Diagnosis:    HAIR LOSS    Physical Exam:  Anatomic Location Affected:  R crown of scalp  Morphological Description:  areas of less hair with perifollicular scale, loss of some follicular ostia and perifollicular halos on " dermatoscope  Pertinent Positives:  Pertinent Negatives:    Additional History of Present Condition:  Patient is being seen for hair loss follow up. Patient notes this has been ongoing for about a year. She notes her hair has not been improving and she has noticed more clumps and thinning. Patient was last seen on 08/06/2024 by Dr. Lainez and was prescribed triamcinolone 1% lotion once a day for 30 days. She has not seen any improvement with this treatment. Patient notes she got her labs done which were normal. Patient does note she has been under a lot of stress. Patient denies any pain to the area but notes itchiness; more when she sweats. Denies pain but endorses pruritus.     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   Discussed option of biopsy in the future; patient deferred at this time.  START applying ketoconazole 2% shampoo with hair washes  START applying clobetasol 0.05% external solution twice daily to affected areas on the scalp  Discussed minoxidil, patient deferred at this time  Discussed other treatment options including doxycyline and plaquenil pending biopsy     Daniel Dean MD  PGY-III Dermatology Resident          [1]   Allergies  Allergen Reactions    Amitriptyline Other (See Comments)     confusion    Propranolol Other (See Comments)     Other reaction(s): Other (See Comments)  Distension abdominal   [2]   Current Outpatient Medications:     aspirin (ECOTRIN LOW STRENGTH) 81 mg EC tablet, Take 81 mg by mouth, Disp: , Rfl:     butalbital-acetaminophen-caffeine (FIORICET,ESGIC) -40 mg per tablet, TAKE 1 TABLET BY MOUTH FOR  MIGRAINE. MAY REPEAT IN 4 HRS IF NEEDED. LIMIT 2TABS/24 HRS., Disp: 15 tablet, Rfl: 5    Calcium Carbonate-Vitamin D3 600-400 MG-UNIT TABS, Take 1 tablet by mouth 2 (two) times a day , Disp: , Rfl:     CeleBREX 200 MG capsule, Take 200 mg by mouth daily, Disp: , Rfl:     gabapentin (NEURONTIN) 100 mg capsule, TAKE 3 CAPS AT BEDTIME ( IN ADDITION TO 600MG AT BEDTIME), Disp: 270 capsule, Rfl: 1    gabapentin (NEURONTIN) 300 mg capsule, TAKE 1 CAPSULE (300 MG) IN THE MORNING AND 2 CAPSULES AT NIGHT, Disp: 270 capsule, Rfl: 1    HYDROcodone-acetaminophen (NORCO) 7.5-325 mg per tablet, , Disp: , Rfl: 0    ketorolac (TORADOL) 10 mg tablet, Take 1 tablet (10 mg total) by mouth every 6 (six) hours as needed (migraine) Max 2-3 per week., Disp: 12 tablet, Rfl: 4    latanoprost (XALATAN) 0.005 % ophthalmic solution, INSTILL 1 DROP INTO BOTH EYES AT BEDTIME, Disp: , Rfl:     linaCLOtide (Linzess) 290 MCG CAPS, Take 290 mcg by mouth daily, Disp: , Rfl:     Linzess 145 MCG CAPS, TAKE 1 (ONE) CAPSULE BY MOUTH BEFORE BREAKFAST, Disp: , Rfl:     morphine (MS CONTIN) 15 mg 12 hr tablet, , Disp: , Rfl:     nicotine (Nicoderm CQ) 21 mg/24 hr TD 24 hr patch, Place 1 patch on the skin Daily (Patient not taking: Reported on 10/28/2024), Disp: , Rfl:     omeprazole (PriLOSEC) 40 MG capsule, Take 40 mg by mouth 2 (two) times a day, Disp: , Rfl:     rosuvastatin (CRESTOR) 10 MG tablet, Take 10 mg by mouth daily, Disp: , Rfl:     tiZANidine (ZANAFLEX) 4 mg tablet, Take 4 mg by mouth daily at bedtime as needed, Disp: , Rfl:     topiramate (TOPAMAX) 200 MG tablet, Take 1 tablet (200 mg total) by mouth 2 (two) times a day, Disp: 180 tablet, Rfl: 0    triamcinolone (KENALOG) 0.1 % cream, APPLY TWICE DAILY FOR UP TO 2 WEEKS TO AFFECTED SKIN ON NECK DOWN AS NEEDED FOR FLARES THEN TAKE ONE WEEK BREAK AND CAN REPEAT REGIMEN AS NEEDED FOR FLARES. DO NOT APPLY TO GROIN, SKIN FOLDS, FACE., Disp: 45 g, Rfl: 0    Current Facility-Administered Medications:      bupivacaine (PF) (MARCAINE) 0.25 % injection 2.5 mL, 2.5 mL, Subcutaneous, Once, Lizeth Kamara PA-C    lidocaine (XYLOCAINE) 1 % injection 2.5 mL, 2.5 mL, Injection, Once, Lizeth Kamara PA-C

## 2025-07-15 DIAGNOSIS — G43.009 MIGRAINE WITHOUT AURA AND WITHOUT STATUS MIGRAINOSUS, NOT INTRACTABLE: ICD-10-CM

## 2025-07-15 DIAGNOSIS — M54.81 OCCIPITAL NEURALGIA OF RIGHT SIDE: ICD-10-CM

## 2025-07-15 DIAGNOSIS — M79.18 MYOFASCIAL MUSCLE PAIN: ICD-10-CM

## 2025-07-15 RX ORDER — GABAPENTIN 300 MG/1
CAPSULE ORAL
Qty: 270 CAPSULE | Refills: 1 | Status: SHIPPED | OUTPATIENT
Start: 2025-07-15

## 2025-07-15 RX ORDER — GABAPENTIN 100 MG/1
CAPSULE ORAL
Qty: 270 CAPSULE | Refills: 1 | Status: SHIPPED | OUTPATIENT
Start: 2025-07-15

## 2025-07-15 RX ORDER — KETOROLAC TROMETHAMINE 10 MG/1
10 TABLET, FILM COATED ORAL EVERY 6 HOURS PRN
Qty: 12 TABLET | Refills: 0 | Status: SHIPPED | OUTPATIENT
Start: 2025-07-15

## 2025-08-05 ENCOUNTER — OFFICE VISIT (OUTPATIENT)
Dept: NEUROLOGY | Facility: CLINIC | Age: 55
End: 2025-08-05
Payer: COMMERCIAL

## 2025-08-05 VITALS
BODY MASS INDEX: 38.27 KG/M2 | SYSTOLIC BLOOD PRESSURE: 146 MMHG | HEART RATE: 91 BPM | HEIGHT: 63 IN | DIASTOLIC BLOOD PRESSURE: 88 MMHG | WEIGHT: 216 LBS

## 2025-08-05 DIAGNOSIS — L65.9 HAIR LOSS: ICD-10-CM

## 2025-08-05 DIAGNOSIS — G40.909 SEIZURE DISORDER (HCC): ICD-10-CM

## 2025-08-05 DIAGNOSIS — G43.009 MIGRAINE WITHOUT AURA AND WITHOUT STATUS MIGRAINOSUS, NOT INTRACTABLE: Primary | ICD-10-CM

## 2025-08-05 PROCEDURE — 99214 OFFICE O/P EST MOD 30 MIN: CPT | Performed by: PHYSICIAN ASSISTANT

## 2025-08-05 PROCEDURE — G2211 COMPLEX E/M VISIT ADD ON: HCPCS | Performed by: PHYSICIAN ASSISTANT

## 2025-08-05 RX ORDER — ATOGEPANT 30 MG/1
TABLET ORAL
Qty: 90 TABLET | Refills: 1 | Status: SHIPPED | OUTPATIENT
Start: 2025-08-05

## 2025-08-05 RX ORDER — TOPIRAMATE 200 MG/1
200 TABLET, FILM COATED ORAL 2 TIMES DAILY
Qty: 180 TABLET | Refills: 2 | Status: SHIPPED | OUTPATIENT
Start: 2025-08-05

## 2025-08-05 RX ORDER — KETOROLAC TROMETHAMINE 10 MG/1
10 TABLET, FILM COATED ORAL EVERY 6 HOURS PRN
Qty: 12 TABLET | Refills: 5 | Status: SHIPPED | OUTPATIENT
Start: 2025-08-05

## 2025-08-07 ENCOUNTER — TELEPHONE (OUTPATIENT)
Age: 55
End: 2025-08-07

## 2025-08-15 ENCOUNTER — TELEPHONE (OUTPATIENT)
Age: 55
End: 2025-08-15